# Patient Record
Sex: MALE
[De-identification: names, ages, dates, MRNs, and addresses within clinical notes are randomized per-mention and may not be internally consistent; named-entity substitution may affect disease eponyms.]

---

## 2017-01-05 PROCEDURE — 82607 VITAMIN B-12: CPT | Performed by: INTERNAL MEDICINE

## 2017-01-05 PROCEDURE — 82043 UR ALBUMIN QUANTITATIVE: CPT | Performed by: INTERNAL MEDICINE

## 2017-01-05 PROCEDURE — 82570 ASSAY OF URINE CREATININE: CPT | Performed by: INTERNAL MEDICINE

## 2017-01-06 PROBLEM — E78.5 HYPERLIPIDEMIA LDL GOAL <100: Status: ACTIVE | Noted: 2017-01-06

## 2017-01-06 PROBLEM — E11.42 DM TYPE 2 WITH DIABETIC PERIPHERAL NEUROPATHY (HCC): Status: ACTIVE | Noted: 2017-01-06

## 2017-01-06 PROBLEM — E11.65 UNCONTROLLED TYPE 2 DIABETES MELLITUS WITH HYPERGLYCEMIA, WITHOUT LONG-TERM CURRENT USE OF INSULIN (HCC): Status: ACTIVE | Noted: 2017-01-06

## 2017-01-24 ENCOUNTER — HOSPITAL (OUTPATIENT)
Dept: OTHER | Age: 82
End: 2017-01-24

## 2017-01-24 LAB
ANION GAP SERPL CALC-SCNC: 12 MMOL/L (ref 10–20)
BUN SERPL-MCNC: 18 MG/DL (ref 10–20)
BUN/CREAT SERPL: 16 (ref 7–25)
CALCIUM SERPL-MCNC: 9.6 MG/DL (ref 8.4–10.2)
CHLORIDE: 105 MMOL/L (ref 98–107)
CO2 SERPL-SCNC: 28 MMOL/L (ref 21–32)
CREAT SERPL-MCNC: 1.11 MG/DL (ref 0.67–1.17)
GLUCOSE SERPL-MCNC: 141 MG/DL (ref 65–99)
POTASSIUM SERPL-SCNC: 4.6 MMOL/L (ref 3.4–5.1)
SODIUM SERPL-SCNC: 140 MMOL/L (ref 135–145)

## 2017-01-25 ENCOUNTER — CHARTING TRANS (OUTPATIENT)
Dept: OTHER | Age: 82
End: 2017-01-25

## 2017-10-03 PROBLEM — E11.65 UNCONTROLLED TYPE 2 DIABETES MELLITUS WITH HYPERGLYCEMIA, WITHOUT LONG-TERM CURRENT USE OF INSULIN (HCC): Status: RESOLVED | Noted: 2017-01-06 | Resolved: 2017-10-03

## 2017-10-03 PROBLEM — E78.5 HYPERLIPIDEMIA LDL GOAL <100: Status: RESOLVED | Noted: 2017-01-06 | Resolved: 2017-10-03

## 2017-11-14 PROBLEM — Z96.1 BILATERAL PSEUDOPHAKIA: Status: ACTIVE | Noted: 2017-11-14

## 2017-11-14 PROBLEM — H16.223 KERATITIS SICCA, BOTH EYES: Status: ACTIVE | Noted: 2017-11-14

## 2017-11-14 PROBLEM — M35.01 KERATITIS SICCA, BOTH EYES (HCC): Status: ACTIVE | Noted: 2017-11-14

## 2018-04-02 PROBLEM — D64.9 ANEMIA, UNSPECIFIED TYPE: Status: ACTIVE | Noted: 2018-04-02

## 2018-04-02 PROBLEM — Z96.1 BILATERAL PSEUDOPHAKIA: Status: RESOLVED | Noted: 2017-11-14 | Resolved: 2018-04-02

## 2018-04-02 PROBLEM — E11.42 DM TYPE 2 WITH DIABETIC PERIPHERAL NEUROPATHY (HCC): Status: RESOLVED | Noted: 2017-01-06 | Resolved: 2018-04-02

## 2018-04-02 PROBLEM — M35.01 KERATITIS SICCA, BOTH EYES (HCC): Status: RESOLVED | Noted: 2017-11-14 | Resolved: 2018-04-02

## 2018-04-02 PROBLEM — H16.223 KERATITIS SICCA, BOTH EYES: Status: RESOLVED | Noted: 2017-11-14 | Resolved: 2018-04-02

## 2018-09-25 PROCEDURE — 82570 ASSAY OF URINE CREATININE: CPT | Performed by: INTERNAL MEDICINE

## 2018-09-25 PROCEDURE — 82043 UR ALBUMIN QUANTITATIVE: CPT | Performed by: INTERNAL MEDICINE

## 2018-10-09 PROBLEM — E11.9 TYPE 2 DIABETES MELLITUS WITHOUT RETINOPATHY (HCC): Status: ACTIVE | Noted: 2018-10-09

## 2018-10-09 PROBLEM — H04.123 DRY EYE SYNDROME OF BILATERAL LACRIMAL GLANDS: Status: ACTIVE | Noted: 2018-10-09

## 2019-04-09 PROBLEM — Z96.1 BILATERAL PSEUDOPHAKIA: Status: RESOLVED | Noted: 2017-11-14 | Resolved: 2019-04-09

## 2019-04-09 PROBLEM — H04.123 DRY EYE SYNDROME OF BILATERAL LACRIMAL GLANDS: Status: RESOLVED | Noted: 2018-10-09 | Resolved: 2019-04-09

## 2019-10-08 PROBLEM — H18.413 ARCUS SENILIS OF CORNEA, BILATERAL: Status: ACTIVE | Noted: 2019-10-08

## 2021-04-11 DIAGNOSIS — Z23 NEED FOR VACCINATION: ICD-10-CM

## 2021-10-18 PROBLEM — E11.9 TYPE 2 DIABETES MELLITUS WITHOUT RETINOPATHY (HCC): Status: RESOLVED | Noted: 2018-10-09 | Resolved: 2021-10-18

## 2021-10-18 PROBLEM — H04.123 DRY EYE SYNDROME OF BILATERAL LACRIMAL GLANDS: Status: RESOLVED | Noted: 2018-10-09 | Resolved: 2021-10-18

## 2021-10-18 PROBLEM — D64.9 ANEMIA, UNSPECIFIED TYPE: Status: RESOLVED | Noted: 2018-04-02 | Resolved: 2021-10-18

## 2024-02-01 ENCOUNTER — APPOINTMENT (OUTPATIENT)
Dept: GENERAL RADIOLOGY | Facility: HOSPITAL | Age: 89
End: 2024-02-01
Attending: EMERGENCY MEDICINE
Payer: MEDICARE

## 2024-02-01 ENCOUNTER — HOSPITAL ENCOUNTER (INPATIENT)
Facility: HOSPITAL | Age: 89
LOS: 9 days | Discharge: HOME HEALTH | End: 2024-02-10
Attending: EMERGENCY MEDICINE | Admitting: INTERNAL MEDICINE
Payer: MEDICARE

## 2024-02-01 ENCOUNTER — APPOINTMENT (OUTPATIENT)
Dept: CV DIAGNOSTICS | Facility: HOSPITAL | Age: 89
End: 2024-02-01
Attending: INTERNAL MEDICINE
Payer: MEDICARE

## 2024-02-01 DIAGNOSIS — I50.9 CONGESTIVE HEART FAILURE, UNSPECIFIED HF CHRONICITY, UNSPECIFIED HEART FAILURE TYPE (HCC): ICD-10-CM

## 2024-02-01 DIAGNOSIS — I48.91 ATRIAL FIBRILLATION, NEW ONSET (HCC): Primary | ICD-10-CM

## 2024-02-01 LAB
ALBUMIN SERPL-MCNC: 3.8 G/DL (ref 3.2–4.8)
ALBUMIN/GLOB SERPL: 1.5 {RATIO} (ref 1–2)
ALP LIVER SERPL-CCNC: 72 U/L
ALT SERPL-CCNC: 20 U/L
ANION GAP SERPL CALC-SCNC: 6 MMOL/L (ref 0–18)
ANION GAP SERPL CALC-SCNC: 6 MMOL/L (ref 0–18)
AST SERPL-CCNC: 25 U/L (ref ?–34)
BASOPHILS # BLD AUTO: 0.04 X10(3) UL (ref 0–0.2)
BASOPHILS NFR BLD AUTO: 0.6 %
BILIRUB SERPL-MCNC: 0.5 MG/DL (ref 0.2–1.1)
BNP SERPL-MCNC: 180 PG/ML
BUN BLD-MCNC: 25 MG/DL (ref 9–23)
BUN BLD-MCNC: 26 MG/DL (ref 9–23)
BUN/CREAT SERPL: 18.4 (ref 10–20)
BUN/CREAT SERPL: 20.2 (ref 10–20)
CALCIUM BLD-MCNC: 9.3 MG/DL (ref 8.7–10.4)
CALCIUM BLD-MCNC: 9.5 MG/DL (ref 8.7–10.4)
CHLORIDE SERPL-SCNC: 106 MMOL/L (ref 98–112)
CHLORIDE SERPL-SCNC: 108 MMOL/L (ref 98–112)
CO2 SERPL-SCNC: 24 MMOL/L (ref 21–32)
CO2 SERPL-SCNC: 27 MMOL/L (ref 21–32)
CREAT BLD-MCNC: 1.24 MG/DL
CREAT BLD-MCNC: 1.41 MG/DL
DEPRECATED RDW RBC AUTO: 45.8 FL (ref 35.1–46.3)
EGFRCR SERPLBLD CKD-EPI 2021: 48 ML/MIN/1.73M2 (ref 60–?)
EGFRCR SERPLBLD CKD-EPI 2021: 56 ML/MIN/1.73M2 (ref 60–?)
EOSINOPHIL # BLD AUTO: 0.07 X10(3) UL (ref 0–0.7)
EOSINOPHIL NFR BLD AUTO: 1 %
ERYTHROCYTE [DISTWIDTH] IN BLOOD BY AUTOMATED COUNT: 12.7 % (ref 11–15)
EST. AVERAGE GLUCOSE BLD GHB EST-MCNC: 143 MG/DL (ref 68–126)
FLUAV + FLUBV RNA SPEC NAA+PROBE: NEGATIVE
FLUAV + FLUBV RNA SPEC NAA+PROBE: NEGATIVE
GLOBULIN PLAS-MCNC: 2.5 G/DL (ref 2.8–4.4)
GLUCOSE BLD-MCNC: 177 MG/DL (ref 70–99)
GLUCOSE BLD-MCNC: 181 MG/DL (ref 70–99)
GLUCOSE BLDC GLUCOMTR-MCNC: 119 MG/DL (ref 70–99)
GLUCOSE BLDC GLUCOMTR-MCNC: 161 MG/DL (ref 70–99)
GLUCOSE BLDC GLUCOMTR-MCNC: 179 MG/DL (ref 70–99)
HBA1C MFR BLD: 6.6 % (ref ?–5.7)
HCT VFR BLD AUTO: 32.5 %
HGB BLD-MCNC: 10.5 G/DL
IMM GRANULOCYTES # BLD AUTO: 0.01 X10(3) UL (ref 0–1)
IMM GRANULOCYTES NFR BLD: 0.1 %
LYMPHOCYTES # BLD AUTO: 1.14 X10(3) UL (ref 1–4)
LYMPHOCYTES NFR BLD AUTO: 17.1 %
MCH RBC QN AUTO: 32 PG (ref 26–34)
MCHC RBC AUTO-ENTMCNC: 32.3 G/DL (ref 31–37)
MCV RBC AUTO: 99.1 FL
MONOCYTES # BLD AUTO: 0.61 X10(3) UL (ref 0.1–1)
MONOCYTES NFR BLD AUTO: 9.1 %
NEUTROPHILS # BLD AUTO: 4.81 X10 (3) UL (ref 1.5–7.7)
NEUTROPHILS # BLD AUTO: 4.81 X10(3) UL (ref 1.5–7.7)
NEUTROPHILS NFR BLD AUTO: 72.1 %
OSMOLALITY SERPL CALC.SUM OF ELEC: 291 MOSM/KG (ref 275–295)
OSMOLALITY SERPL CALC.SUM OF ELEC: 301 MOSM/KG (ref 275–295)
PLATELET # BLD AUTO: 218 10(3)UL (ref 150–450)
POTASSIUM SERPL-SCNC: 4.5 MMOL/L (ref 3.5–5.1)
POTASSIUM SERPL-SCNC: 4.8 MMOL/L (ref 3.5–5.1)
PROT SERPL-MCNC: 6.3 G/DL (ref 5.7–8.2)
Q-T INTERVAL: 318 MS
QRS DURATION: 116 MS
QTC CALCULATION (BEZET): 469 MS
R AXIS: 32 DEGREES
RBC # BLD AUTO: 3.28 X10(6)UL
RSV RNA SPEC NAA+PROBE: NEGATIVE
SARS-COV-2 RNA RESP QL NAA+PROBE: NOT DETECTED
SODIUM SERPL-SCNC: 136 MMOL/L (ref 136–145)
SODIUM SERPL-SCNC: 141 MMOL/L (ref 136–145)
T AXIS: 147 DEGREES
TROPONIN I SERPL HS-MCNC: 18 NG/L
VENTRICULAR RATE: 131 BPM
WBC # BLD AUTO: 6.7 X10(3) UL (ref 4–11)

## 2024-02-01 PROCEDURE — 0241U SARS-COV-2/FLU A AND B/RSV BY PCR (GENEXPERT): CPT | Performed by: EMERGENCY MEDICINE

## 2024-02-01 PROCEDURE — 93005 ELECTROCARDIOGRAM TRACING: CPT

## 2024-02-01 PROCEDURE — 84484 ASSAY OF TROPONIN QUANT: CPT | Performed by: EMERGENCY MEDICINE

## 2024-02-01 PROCEDURE — 93306 TTE W/DOPPLER COMPLETE: CPT | Performed by: INTERNAL MEDICINE

## 2024-02-01 PROCEDURE — 85025 COMPLETE CBC W/AUTO DIFF WBC: CPT | Performed by: EMERGENCY MEDICINE

## 2024-02-01 PROCEDURE — 99285 EMERGENCY DEPT VISIT HI MDM: CPT

## 2024-02-01 PROCEDURE — 83036 HEMOGLOBIN GLYCOSYLATED A1C: CPT | Performed by: HOSPITALIST

## 2024-02-01 PROCEDURE — 96375 TX/PRO/DX INJ NEW DRUG ADDON: CPT

## 2024-02-01 PROCEDURE — 96374 THER/PROPH/DIAG INJ IV PUSH: CPT

## 2024-02-01 PROCEDURE — 80053 COMPREHEN METABOLIC PANEL: CPT | Performed by: EMERGENCY MEDICINE

## 2024-02-01 PROCEDURE — 71045 X-RAY EXAM CHEST 1 VIEW: CPT | Performed by: EMERGENCY MEDICINE

## 2024-02-01 PROCEDURE — 82962 GLUCOSE BLOOD TEST: CPT

## 2024-02-01 PROCEDURE — 99291 CRITICAL CARE FIRST HOUR: CPT

## 2024-02-01 PROCEDURE — 93010 ELECTROCARDIOGRAM REPORT: CPT

## 2024-02-01 PROCEDURE — 83880 ASSAY OF NATRIURETIC PEPTIDE: CPT | Performed by: EMERGENCY MEDICINE

## 2024-02-01 RX ORDER — CLOPIDOGREL BISULFATE 75 MG/1
75 TABLET ORAL DAILY
Status: DISCONTINUED | OUTPATIENT
Start: 2024-02-02 | End: 2024-02-01

## 2024-02-01 RX ORDER — NICOTINE POLACRILEX 4 MG
30 LOZENGE BUCCAL
Status: DISCONTINUED | OUTPATIENT
Start: 2024-02-01 | End: 2024-02-10

## 2024-02-01 RX ORDER — ACETAMINOPHEN 500 MG
500 TABLET ORAL EVERY 4 HOURS PRN
Status: DISCONTINUED | OUTPATIENT
Start: 2024-02-01 | End: 2024-02-10

## 2024-02-01 RX ORDER — ATORVASTATIN CALCIUM 20 MG/1
20 TABLET, FILM COATED ORAL DAILY
Status: DISCONTINUED | OUTPATIENT
Start: 2024-02-02 | End: 2024-02-09

## 2024-02-01 RX ORDER — DILTIAZEM HYDROCHLORIDE 5 MG/ML
10 INJECTION INTRAVENOUS ONCE
Status: COMPLETED | OUTPATIENT
Start: 2024-02-01 | End: 2024-02-01

## 2024-02-01 RX ORDER — ONDANSETRON 2 MG/ML
4 INJECTION INTRAMUSCULAR; INTRAVENOUS EVERY 6 HOURS PRN
Status: DISCONTINUED | OUTPATIENT
Start: 2024-02-01 | End: 2024-02-10

## 2024-02-01 RX ORDER — METOCLOPRAMIDE HYDROCHLORIDE 5 MG/ML
10 INJECTION INTRAMUSCULAR; INTRAVENOUS EVERY 8 HOURS PRN
Status: DISCONTINUED | OUTPATIENT
Start: 2024-02-01 | End: 2024-02-02

## 2024-02-01 RX ORDER — DILTIAZEM HYDROCHLORIDE 5 MG/ML
10 INJECTION INTRAVENOUS ONCE
Status: DISCONTINUED | OUTPATIENT
Start: 2024-02-01 | End: 2024-02-02 | Stop reason: ALTCHOICE

## 2024-02-01 RX ORDER — FUROSEMIDE 10 MG/ML
40 INJECTION INTRAMUSCULAR; INTRAVENOUS ONCE
Status: COMPLETED | OUTPATIENT
Start: 2024-02-01 | End: 2024-02-01

## 2024-02-01 RX ORDER — METOPROLOL SUCCINATE 25 MG/1
25 TABLET, EXTENDED RELEASE ORAL
Status: DISCONTINUED | OUTPATIENT
Start: 2024-02-01 | End: 2024-02-01

## 2024-02-01 RX ORDER — ENOXAPARIN SODIUM 100 MG/ML
1 INJECTION SUBCUTANEOUS EVERY 12 HOURS
Status: DISCONTINUED | OUTPATIENT
Start: 2024-02-01 | End: 2024-02-10

## 2024-02-01 RX ORDER — FUROSEMIDE 10 MG/ML
40 INJECTION INTRAMUSCULAR; INTRAVENOUS DAILY
Status: DISCONTINUED | OUTPATIENT
Start: 2024-02-02 | End: 2024-02-02

## 2024-02-01 RX ORDER — DEXTROSE MONOHYDRATE 25 G/50ML
50 INJECTION, SOLUTION INTRAVENOUS
Status: DISCONTINUED | OUTPATIENT
Start: 2024-02-01 | End: 2024-02-10

## 2024-02-01 RX ORDER — NICOTINE POLACRILEX 4 MG
15 LOZENGE BUCCAL
Status: DISCONTINUED | OUTPATIENT
Start: 2024-02-01 | End: 2024-02-10

## 2024-02-01 RX ORDER — HYDRALAZINE HYDROCHLORIDE 25 MG/1
25 TABLET, FILM COATED ORAL EVERY 8 HOURS SCHEDULED
Status: DISCONTINUED | OUTPATIENT
Start: 2024-02-01 | End: 2024-02-07

## 2024-02-01 NOTE — H&P
DM Hospitalist H&P       CC:   Chief Complaint   Patient presents with    Abnormal EKG        PCP: Donavan Noonan MD    Date of Admission: 2/1/2024 10:56 AM    ASSESSMENT / PLAN:     Mr. Heard is an 88 yo M with PMH of CVA, PVD, HL who presented from [facility] with new onset Afib.     New onset Afib with RVR  - HR 130s initially  - s/p IVP diltiazem  - cardiology consulted  - check TTE, TSH  - start lovenox, plan to switch to DOAC    Concern for CHF  Fluid overload  - s/p IV lasix in ER, continue lasix 40 mg daily  - TTE ordered  - weights, strict I/O    Hx CVA  - continue plavix    PVD/HL  - statin    ACP  - CODE- DNR/full- confirmed with patient  - POA- wife, then daughter  - lives with spouse at independent living    FN:  - IVF: none  - Diet: cardiac    DVT Prophy:SCD, lovenox  Lines: PIV    Dispo: pending clinical course    Outpatient records or previous hospital records reviewed.     Further recommendations pending patient's clinical course.  DM hospitalist to continue to follow patient while in house    Patient and/or patient's family given opportunity to ask questions and note understanding and agreeing with therapeutic plan as outlined    Aaliyah Alva MD  [facility] [place], [place] Hospitalist  Answering Service number: [phone]    HPI       History of Present Illness:      Mr. Heard is an 88 yo M with PMH of CVA, PVD, HL who presented from [facility] with new onset Afib. Patient has been having shortness of breath x 1 week, went to [facility], found to have new onset Afib with RVR and concern for fluid overload/CHF. States he has gained about 5 lbs and noticed new leg swelling as well. No prior hx of CHF or Afib. Has had a stroke in the past.    In the ED, found to have Afib with RVR, HR 130s. Given IVP diltiazem and started on diltiazem gtt. Also given IV lasix for fluid overload    PMH  Past Medical History:   Diagnosis Date    Cataract     Dry eye     Former smoker     Hyperlipidemia     Impaired fasting glucose     Left carotid  bruit     Orbital mass     left    Pseudophakia     PVD (peripheral vascular disease) (HCC)     Stroke (cerebrum) ([facility])         PSH  Past Surgical History:   Procedure Laterality Date    CAROTID ENDARTERECTOMY Left     CATARACT Bilateral     YAG CAPSULOTOMY - OD - RIGHT EYE Right 2005        ALL:  No Known Allergies     Home Medications:  No outpatient medications have been marked as taking for the 24 encounter (Hospital Encounter).         Soc Hx  Social History     Tobacco Use    Smoking status: Former     Packs/day: 1.00     Years: 30.00     Additional pack years: 0.00     Total pack years: 30.00     Types: Cigarettes     Quit date: 1978     Years since quittin.1    Smokeless tobacco: Never   Substance Use Topics    Alcohol use: No     Alcohol/week: 0.0 standard drinks of alcohol        Fam Hx  Family History   Problem Relation Age of Onset    Cataracts Mother        Review of Systems  Comprehensive ROS reviewed and negative except for what's stated above.     OBJECTIVE:  /69   Pulse 105   Temp 97.9 °F (36.6 °C) (Oral)   Resp 20   Ht 6' (1.829 m)   Wt 160 lb (72.6 kg)   SpO2 93%   BMI 21.70 kg/m²     GEN: elderly male in NAD  HEENT: EOMI,  Pulm: decreased breath sounds at bases, bibasilar crackles  CV: tachycardic, irregularly irregular  ABD: Soft, non-tender, non-distended, +BS  SKIN: warm, dry  EXT: 1-2+pitting edema    Diagnostic Data:    CBC/Chem    Recent Labs   Lab 24  1103   RBC 3.28*   HGB 10.5*   HCT 32.5*   MCV 99.1   MCH 32.0   MCHC 32.3   RDW 12.7   NEPRELIM 4.81   WBC 6.7   .0         Recent Labs   Lab 24  1103   *   BUN 25*   CREATSERUM 1.24   EGFRCR 56*   CA 9.3      K 4.8      CO2 24.0     Lab Results   Component Value Date    WBC 6.7 2024    HGB 10.5 2024    HCT 32.5 2024    .0 2024    CREATSERUM 1.24 2024    BUN 25 2024     2024    K 4.8 2024      02/01/2024    CO2 24.0 02/01/2024     02/01/2024    CA 9.3 02/01/2024    ALB 3.8 02/01/2024    ALKPHO 72 02/01/2024    BILT 0.5 02/01/2024    TP 6.3 02/01/2024    AST 25 02/01/2024    ALT 20 02/01/2024    TROPHS 18 02/01/2024       No results for input(s): \"TROP\" in the last 168 hours.    Additional Diagnostics: ECG: afib with RVR    Radiology: XR CHEST AP PORTABLE  (CPT=71045)    Result Date: 2/1/2024  CONCLUSION:  1. Normal heart size with moderate pulmonary edema pattern suggesting cardiac failure/fluid overload.  Small bilateral pleural effusions.  I cannot definitely exclude bibasilar pneumonia.  Correlate clinically and follow-up studies are advised.    Dictated by (CST): Damian Bowen MD on 2/01/2024 at 11:18 AM     Finalized by (CST): Damian Bowen MD on 2/01/2024 at 11:19 AM

## 2024-02-01 NOTE — ED PROVIDER NOTES
Patient Seen in: [facility] Emergency Department      History     Chief Complaint   Patient presents with    Abnormal EKG     Stated Complaint: abnormal ekg, afib rvr    Subjective:   HPI    The patient is an 89-year-old male who presents with 1 week of shortness of breath that is increased with exertion.  Today found to be in atrial fibrillation and sent here for further evaluation.  Patient had a few days of chest pain but none in the last 2 days.  He has also had a mild cough and sore throat.  No fevers or chills.  No leg pain or swelling.    Objective:   Past Medical History:   Diagnosis Date    Cataract     Dry eye     Former smoker     Hyperlipidemia     Impaired fasting glucose     Left carotid bruit     Orbital mass     left    Pseudophakia     PVD (peripheral vascular disease) (HCC)     Stroke (cerebrum) (HCC)               Past Surgical History:   Procedure Laterality Date    CAROTID ENDARTERECTOMY Left     CATARACT Bilateral     YAG CAPSULOTOMY - OD - RIGHT EYE Right 2005                Social History     Socioeconomic History    Marital status:    Tobacco Use    Smoking status: Former     Packs/day: 1.00     Years: 30.00     Additional pack years: 0.00     Total pack years: 30.00     Types: Cigarettes     Quit date: 1978     Years since quittin.1    Smokeless tobacco: Never   Vaping Use    Vaping Use: Never used   Substance and Sexual Activity    Alcohol use: No     Alcohol/week: 0.0 standard drinks of alcohol    Drug use: No              Review of Systems    Positive for stated complaint: abnormal ekg, afib rvr  Other systems are as noted in HPI.  Constitutional and vital signs reviewed.      All other systems reviewed and negative except as noted above.    Physical Exam     ED Triage Vitals [24 1059]   /77   Pulse (!) 123   Resp 24   Temp 97.9 °F (36.6 °C)   Temp src Oral   SpO2 95 %   O2 Device None (Room air)       Current:/81   Pulse 86   Temp  97.9 °F (36.6 °C) (Oral)   Resp 22   Ht 182.9 cm (6')   Wt 72.6 kg   SpO2 93%   BMI 21.70 kg/m²         Physical Exam  Vitals and nursing note reviewed.   Constitutional:       General: He is not in acute distress.     Appearance: Normal appearance. He is well-developed. He is not ill-appearing.   HENT:      Head: Normocephalic and atraumatic.      Nose: Congestion present.      Mouth/Throat:      Mouth: Mucous membranes are moist.      Pharynx: No posterior oropharyngeal erythema.   Eyes:      Conjunctiva/sclera: Conjunctivae normal.      Pupils: Pupils are equal, round, and reactive to light.   Neck:      Vascular: No JVD.   Cardiovascular:      Rate and Rhythm: Tachycardia present. Rhythm irregular.      Heart sounds: Normal heart sounds. No murmur heard.  Pulmonary:      Effort: Pulmonary effort is normal.      Breath sounds: Wheezing and rhonchi present.   Abdominal:      General: Bowel sounds are normal. There is no distension.      Palpations: Abdomen is soft. There is no mass.      Tenderness: There is no abdominal tenderness. There is no guarding or rebound.   Musculoskeletal:         General: Normal range of motion.      Cervical back: Normal range of motion and neck supple.      Right lower leg: No edema.      Left lower leg: No edema.   Skin:     General: Skin is warm and dry.      Capillary Refill: Capillary refill takes less than 2 seconds.      Findings: No rash.   Neurological:      General: No focal deficit present.      Mental Status: He is alert and oriented to person, place, and time.      Deep Tendon Reflexes: Reflexes are normal and symmetric.   Psychiatric:         Judgment: Judgment normal.       Differential diagnosis includes new onset atrial fibrillation, other arrhythmia, electrolyte abnormality CHF, pneumonia, viral upper respiratory tract infection        ED Course     Labs Reviewed   COMP METABOLIC PANEL (14) - Abnormal; Notable for the following components:       Result Value     Glucose 177 (*)     BUN 25 (*)     BUN/CREA Ratio 20.2 (*)     eGFR-Cr 56 (*)     Globulin  2.5 (*)     All other components within normal limits   BNP (B TYPE NATRIURETIC PEPTIDE) - Abnormal; Notable for the following components:    Beta Natriuretic Peptide 180 (*)     All other components within normal limits   CBC W/ DIFFERENTIAL - Abnormal; Notable for the following components:    RBC 3.28 (*)     HGB 10.5 (*)     HCT 32.5 (*)     All other components within normal limits   TROPONIN I HIGH SENSITIVITY - Normal   SARS-COV-2/FLU A AND B/RSV BY PCR (GENEXPERT) - Normal    Narrative:     This test is intended for the qualitative detection and differentiation of SARS-CoV-2, influenza A, influenza B, and respiratory syncytial virus (RSV) viral RNA in nasopharyngeal or nares swabs from individuals suspected of respiratory viral infection consistent with COVID-19 by their healthcare provider. Signs and symptoms of respiratory viral infection due to SARS-CoV-2, influenza, and RSV can be similar.    Test performed using the [employer] Xpress SARS-CoV-2/FLU/RSV (real time RT-PCR)  assay on the [employer] instrument, [address], [place] [address].   This test is being used under the Food and Drug Administration's Emergency Use Authorization.    The authorized Fact Sheet for Healthcare Providers for this assay is available upon request from the laboratory.   CBC WITH DIFFERENTIAL WITH PLATELET    Narrative:     The following orders were created for panel order CBC With Differential With Platelet.  Procedure                               Abnormality         Status                     ---------                               -----------         ------                     CBC W/ DIFFERENTIAL[[id number]]          Abnormal            Final result                 Please view results for these tests on the individual orders.   RAINBOW DRAW LAVENDER   RAINBOW DRAW LIGHT GREEN   RAINBOW DRAW BLUE   RAINBOW DRAW GOLD     EKG    Rate, intervals  and axes as noted on EKG Report.  Rate: 131  Rhythm: Atrial Fibrillation  Reading: Atrial fibrillation with rapid ventricular response, LVH, nonspecific ST changes                          MDM      Pulse ox 93% on room air with mild hypoxia  Admission disposition: 2/1/2024 12:12 PM                                        Medical Decision Making  Patient with new onset atrial fibrillation with CHF heart rate controlled with Cardizem  Will admit with cardiology on consult for further management\  Vital signs stable prior to admission    Problems Addressed:  Atrial fibrillation, new onset (HCC): acute illness or injury that poses a threat to life or bodily functions  Congestive heart failure, unspecified HF chronicity, unspecified heart failure type (HCC): acute illness or injury that poses a threat to life or bodily functions    Amount and/or Complexity of Data Reviewed  Independent Historian: spouse and EMS  Labs: ordered.  Radiology: ordered and independent interpretation performed.     Details: CHF other results per radiologist  ECG/medicine tests: ordered and independent interpretation performed. Decision-making details documented in ED Course.  Discussion of management or test interpretation with external provider(s): Case discussed with duly hospitalist and cardiology and patient will be admitted for further management    Risk  Decision regarding hospitalization.    Critical Care  Total time providing critical care: minutes (A total of 30 minutes of critical care time (exclusive of billable procedures) was administered to manage the patient's cardiovascular instability due to his new onset atrial fibrillation with CHF.  This involved direct patient intervention, complex decision making, and/or extensive discussions with the patient, family, and clinical staff.  )        Disposition and Plan     Clinical Impression:  1. Atrial fibrillation, new onset (HCC)    2. Congestive heart failure, unspecified HF chronicity,  unspecified heart failure type (HCC)         Disposition:  Admit  2/1/2024 12:12 pm    Follow-up:  No follow-up provider specified.  We recommend that you schedule follow up care with a primary care provider within the next three months to obtain basic health screening including reassessment of your blood pressure.      Medications Prescribed:  Current Discharge Medication List                            Hospital Problems       Present on Admission  Date Reviewed: 3/14/2022            ICD-10-CM Noted POA    * (Principal) Atrial fibrillation, new onset (HCC) I48.91 2/1/2024 Unknown

## 2024-02-01 NOTE — PLAN OF CARE
Chris is alert and oriented, up with standby assistance. Room air. IV cardizem. IV lasix. Echo today. Plan is for rate control and to continue diuresis.   Problem: Patient Centered Care  Goal: Patient preferences are identified and integrated in the patient's plan of care  Description: Interventions:  - What would you like us to know as we care for you? I live at an independent living facility with my wife.   - Provide timely, complete, and accurate information to patient/family  - Incorporate patient and family knowledge, values, beliefs, and cultural backgrounds into the planning and delivery of care  - Encourage patient/family to participate in care and decision-making at the level they choose  - Honor patient and family perspectives and choices  Outcome: Progressing     Problem: Patient/Family Goals  Goal: Patient/Family Long Term Goal  Description: Patient's Long Term Goal: to go home    Interventions:  - rate control  -diuretics  - See additional Care Plan goals for specific interventions  Outcome: Progressing  Goal: Patient/Family Short Term Goal  Description: Patient's Short Term Goal: to feel better    Interventions:   - rate control  -diuretics  - See additional Care Plan goals for specific interventions  Outcome: Progressing

## 2024-02-01 NOTE — CONSULTS
Cardiology Consultation  [facility]    Edward Heard Patient Status:  Inpatient    1934 MRN [id number]   Location [facility] 1W Attending Aaliyah Alva MD   Hosp Day # 0 PCP Donavan Noonan MD     Reason for Consultation:  Atrial fibrillation, congestive heart failure    History of Present Illness:  Edward Heard is a a(n) 89 year old male with hyperlipidemia, history of TIA, peripheral vascular disease who presents with 1 week of shortness of breath and decreased exertional capacity.  He states he felt well 1 to 2 weeks ago but has since then had progressive dyspnea on exertion.  He also endorses swelling of his feet to his ankles.  He has no prior history of atrial fibrillation, MI, CHF.  He has history of TIA that occurred in the late  for which he takes clopidogrel 75 mg daily.    He denies chest pain, nausea, diaphoresis.    EKG showing atrial fib with RVR, LBBB (new compared to EKG from ).  Hstrop negative  Cr 1.24, Na 136    Care everywhere reviewed.    Assessment/Plan:    Atrial fibrillation with RVR  With evidence of volume overload.  His rate ranges from 100-130 bpm. Given that he is volume overloaded, would allow for some degree of compensatory tachycardia at this time.  Congestive heart failure  Unknown etiology. May be driven afib with RVR. No recent TTE available.  HDL  TIA  PVD    PLAN  Lasix IV 40mg BID  TTE  Avoid AV kirit blocking agents unless HR > 130 bpm given CHF  Eliquis 5mg BID. Can discontinue clopidgrel to minimize bleeding risk given his age.  Atorvastatin 40mg daily    History:  Past Medical History:   Diagnosis Date    Cataract     Dry eye     Former smoker     Hyperlipidemia     Impaired fasting glucose     Left carotid bruit     Orbital mass     left    Pseudophakia     PVD (peripheral vascular disease) (HCC)     Stroke (cerebrum) (HCC)      Past Surgical History:   Procedure Laterality Date    CAROTID ENDARTERECTOMY Left     CATARACT Bilateral      YAG CAPSULOTOMY - OD - RIGHT EYE Right 06/21/2005     Family History   Problem Relation Age of Onset    Cataracts Mother       reports that he quit smoking about 45 years ago. His smoking use included cigarettes. He has a 30 pack-year smoking history. He has never used smokeless tobacco. He reports that he does not drink alcohol and does not use drugs.    Allergies:  No Known Allergies    Medications:  No current facility-administered medications on file prior to encounter.     Current Outpatient Medications on File Prior to Encounter   Medication Sig Dispense Refill    metFORMIN 500 MG Oral Tab Take 1 tablet (500 mg total) by mouth daily with breakfast.      atorvastatin 20 MG Oral Tab Take 1 tablet (20 mg total) by mouth daily. 90 tablet 3    clopidogrel 75 MG Oral Tab Take 1 tablet (75 mg total) by mouth daily. 90 tablet 3    MULTIVITAMINS OR one tab daily         Review of Systems:  Constitutional: denies fevers, chills, night sweats  HEENT: denies headache, vision changes, trouble or pain with swallowing  Cardiac: denies chest pain, palpitations, edema  Pulm: denies dyspnea, cough, wheeze  GI: denies n/v, abd pain, diarrhea or constipation  : denies hematuria, dysuria, incontinence  MSK: denies muscle or joint pains  Neuro: denies numbness, weakness, paresthesias  Psych: denies anxiety, depression  Integument: denies skin rashes or lesions  Heme: denies easy bruising or bleeding  Endo: denies heat/cold intolerance, skin or nail changes      Physical Exam:  Blood pressure 124/69, pulse 105, temperature 97.9 °F (36.6 °C), temperature source Oral, resp. rate 20, height 6' (1.829 m), weight 160 lb (72.6 kg), SpO2 93%.  Wt Readings from Last 3 Encounters:   02/01/24 160 lb (72.6 kg)   10/18/21 168 lb (76.2 kg)   07/02/21 167 lb 9.6 oz (76 kg)       General: awake, alert, oriented x 3, no acute distress  HEENT: at/nc, perrl, eomi  Neck: JVP to 12 cm H2O, carotids 2+ no bruits.  Cardiac: Tachycardia, irregularly  irregular, S1, S2 normal, no murmur, rub or gallop.  Lungs: No rhonchi or dullness.  Normal excursions and effort. Bilateral crackles to mid lung.  Abdomen: Soft, non-tender, non-distended, normal bowel sounds   Extremities: Without clubbing, cyanosis. Peripheral pulses are 2+. 1+ pitting edema to ankles bilaterally.  Neurologic: Alert and oriented, normal affect.  Psych: normal mood and affect  Skin: Warm and dry.     Laboratories and Data:  Diagnostics:    Labs:   CBC:    Lab Results   Component Value Date    WBC 6.7 02/01/2024    WBC 7.36 10/01/2020    WBC 7.01 11/07/2019     Lab Results   Component Value Date    HEMOGLOBIN 12.7 04/02/2015    HEMOGLOBIN 12.7 10/01/2014    HEMOGLOBIN 12.7 04/02/2014    HGB 10.5 (L) 02/01/2024    HGB 11.4 (L) 10/01/2020    HGB 11.3 (L) 11/07/2019      Lab Results   Component Value Date    .0 02/01/2024     10/01/2020     11/07/2019     BMP:     Lab Results   Component Value Date    GLUCOSE 136 (H) 04/02/2015    GLUCOSE 122 (H) 10/01/2014    GLUCOSE 114 (H) 04/02/2014     Lab Results   Component Value Date    K 4.8 02/01/2024    K 5.45 (H) 10/18/2021    K 5.64 (H) 10/01/2020     Lab Results   Component Value Date    BUN 25 (H) 02/01/2024    BUN 24.0 (H) 10/18/2021    BUN 21.0 (H) 10/01/2020     Lab Results   Component Value Date    CREATSERUM 1.24 02/01/2024    CREATSERUM 1.12 10/18/2021    CREATSERUM 1.11 10/01/2020     Cholesterol:     Lab Results   Component Value Date    CHOLEST 133.00 10/18/2021    CHOLEST 116.00 10/01/2020    CHOLEST 120.00 09/30/2019     Lab Results   Component Value Date    HDL 58 10/18/2021    HDL 54 10/01/2020    HDL 60 (L) 09/30/2019     Lab Results   Component Value Date    TRIG 44.00 10/18/2021    TRIG 42.00 10/01/2020    TRIG 46.00 09/30/2019    TRIGLY 61 04/02/2015    TRIGLY 75 10/01/2014    TRIGLY 63 04/02/2014     Lab Results   Component Value Date    LDL 66 10/18/2021    LDL 54 10/01/2020    LDL 51 09/30/2019     Lab Results    Component Value Date    AST 25 02/01/2024    AST 19 10/18/2021    AST 19 10/01/2020     Lab Results   Component Value Date    ALT 20 02/01/2024    ALT 14 10/18/2021    ALT 12 10/01/2020           Mau Grigsby MD  Interventional Cardiology  [place]  2/1/2024  1:00 PM

## 2024-02-01 NOTE — PLAN OF CARE
BRIEF NOTE    Briefly, patient is a 89 yo man with HDL, hx TIA, PVD presenting with CHF and in afib wit RVR.  Initially received IV lasix and dilt IV for HR control.  He had TTE today, which shows severely depressed LVEF to 15-20% with global hypokinesis.    -- Will discontinue diltiazem given severely depressed LVEF  -- Can allow for compensatory tachycardia given severely depressed LVEF  -- If HR is consistently > 130 bpm, can trial beta-blocker if needed.  -- Continue aggressive IV diuresis.   -- Depressed LVEF may be 2/2 tachy-mediated cardiomyopathy but still needs ischemic evaluation. Can plan for ischemic evaluation when patient more euvolemic and is hemodynamically table.    Mau Grigsby MD  Interventional Cardiology  [place]

## 2024-02-02 LAB
ANION GAP SERPL CALC-SCNC: 5 MMOL/L (ref 0–18)
ANION GAP SERPL CALC-SCNC: 8 MMOL/L (ref 0–18)
BNP SERPL-MCNC: 264 PG/ML
BUN BLD-MCNC: 29 MG/DL (ref 9–23)
BUN BLD-MCNC: 33 MG/DL (ref 9–23)
BUN/CREAT SERPL: 20.1 (ref 10–20)
BUN/CREAT SERPL: 21.3 (ref 10–20)
CALCIUM BLD-MCNC: 9.3 MG/DL (ref 8.7–10.4)
CALCIUM BLD-MCNC: 9.4 MG/DL (ref 8.7–10.4)
CHLORIDE SERPL-SCNC: 108 MMOL/L (ref 98–112)
CHLORIDE SERPL-SCNC: 109 MMOL/L (ref 98–112)
CO2 SERPL-SCNC: 26 MMOL/L (ref 21–32)
CO2 SERPL-SCNC: 28 MMOL/L (ref 21–32)
CREAT BLD-MCNC: 1.44 MG/DL
CREAT BLD-MCNC: 1.55 MG/DL
DEPRECATED RDW RBC AUTO: 45.3 FL (ref 35.1–46.3)
EGFRCR SERPLBLD CKD-EPI 2021: 43 ML/MIN/1.73M2 (ref 60–?)
EGFRCR SERPLBLD CKD-EPI 2021: 46 ML/MIN/1.73M2 (ref 60–?)
ERYTHROCYTE [DISTWIDTH] IN BLOOD BY AUTOMATED COUNT: 12.7 % (ref 11–15)
GLUCOSE BLD-MCNC: 144 MG/DL (ref 70–99)
GLUCOSE BLD-MCNC: 154 MG/DL (ref 70–99)
GLUCOSE BLDC GLUCOMTR-MCNC: 120 MG/DL (ref 70–99)
GLUCOSE BLDC GLUCOMTR-MCNC: 142 MG/DL (ref 70–99)
GLUCOSE BLDC GLUCOMTR-MCNC: 175 MG/DL (ref 70–99)
GLUCOSE BLDC GLUCOMTR-MCNC: 175 MG/DL (ref 70–99)
HCT VFR BLD AUTO: 34.1 %
HGB BLD-MCNC: 11.6 G/DL
MCH RBC QN AUTO: 33.4 PG (ref 26–34)
MCHC RBC AUTO-ENTMCNC: 34 G/DL (ref 31–37)
MCV RBC AUTO: 98.3 FL
OSMOLALITY SERPL CALC.SUM OF ELEC: 302 MOSM/KG (ref 275–295)
OSMOLALITY SERPL CALC.SUM OF ELEC: 304 MOSM/KG (ref 275–295)
PLATELET # BLD AUTO: 254 10(3)UL (ref 150–450)
POTASSIUM SERPL-SCNC: 4.4 MMOL/L (ref 3.5–5.1)
POTASSIUM SERPL-SCNC: 5.3 MMOL/L (ref 3.5–5.1)
RBC # BLD AUTO: 3.47 X10(6)UL
SODIUM SERPL-SCNC: 142 MMOL/L (ref 136–145)
SODIUM SERPL-SCNC: 142 MMOL/L (ref 136–145)
WBC # BLD AUTO: 8.4 X10(3) UL (ref 4–11)

## 2024-02-02 PROCEDURE — 97165 OT EVAL LOW COMPLEX 30 MIN: CPT

## 2024-02-02 PROCEDURE — 94640 AIRWAY INHALATION TREATMENT: CPT

## 2024-02-02 PROCEDURE — 83880 ASSAY OF NATRIURETIC PEPTIDE: CPT | Performed by: INTERNAL MEDICINE

## 2024-02-02 PROCEDURE — 80048 BASIC METABOLIC PNL TOTAL CA: CPT | Performed by: INTERNAL MEDICINE

## 2024-02-02 PROCEDURE — 97530 THERAPEUTIC ACTIVITIES: CPT

## 2024-02-02 PROCEDURE — 97161 PT EVAL LOW COMPLEX 20 MIN: CPT

## 2024-02-02 PROCEDURE — 85027 COMPLETE CBC AUTOMATED: CPT | Performed by: HOSPITALIST

## 2024-02-02 PROCEDURE — 94799 UNLISTED PULMONARY SVC/PX: CPT

## 2024-02-02 PROCEDURE — 82962 GLUCOSE BLOOD TEST: CPT

## 2024-02-02 PROCEDURE — 80048 BASIC METABOLIC PNL TOTAL CA: CPT | Performed by: HOSPITALIST

## 2024-02-02 RX ORDER — METOCLOPRAMIDE HYDROCHLORIDE 5 MG/ML
5 INJECTION INTRAMUSCULAR; INTRAVENOUS EVERY 8 HOURS PRN
Status: DISCONTINUED | OUTPATIENT
Start: 2024-02-02 | End: 2024-02-09

## 2024-02-02 RX ORDER — IPRATROPIUM BROMIDE AND ALBUTEROL SULFATE 2.5; .5 MG/3ML; MG/3ML
3 SOLUTION RESPIRATORY (INHALATION) EVERY 6 HOURS PRN
Status: DISCONTINUED | OUTPATIENT
Start: 2024-02-02 | End: 2024-02-10

## 2024-02-02 NOTE — PROGRESS NOTES
[facility] [place]     CC: Hospital Follow up    PCP: Donavan Noonan MD       Assessment/Plan:     Principal Problem:    Atrial fibrillation, new onset (HCC)  Active Problems:    Congestive heart failure, unspecified HF chronicity, unspecified heart failure type (HCC)    Mr. Heard is an 88 yo M with PMH of CVA, PVD, HL who presented from [facility] with new onset Afib.      Acute systolic CHF  Fluid overload  - s/p IV lasix, now on hold  - TTE with EF 15-20%  - weights, strict I/O  - plan for cardiac cath on Monday    New onset Afib with RVR  - HR 130s initially  - s/p IVP diltiazem  - cardiology consulted  - check TTE, TSH  - start lovenox, plan to switch to DOAC closer to discharge  - PO metoprolol started, more lenient with HR due to systolic CHF     Hx CVA  - continue plavix     PVD/HL  - statin     ACP  - CODE- DNR/full- confirmed with patient  - POA- wife, then daughter  - lives with spouse at independent living     FN:  - IVF: none  - Diet: cardiac     DVT Prophy:SCD, lovenox  Lines: PIV     Dispo: pending clinical course     Outpatient records or previous hospital records reviewed.      Further recommendations pending patient's clinical course.  [facility] [place] continue to follow patient while in house     Patient and/or patient's family given opportunity to ask questions and note understanding and agreeing with therapeutic plan as outlined     Aaliyah Alva MD  [facility] [facility]  Answering Service number: [phone]     Subjective:     Initially wanted to go home today, long discussion with multiple family members at bedside, wife, 3 sons, daughter, wanted patient to say, patient agreeable to staying for cardiac cath on Monday.     OBJECTIVE:    Blood pressure 96/63, pulse (!) 128, temperature 98 °F (36.7 °C), temperature source Oral, resp. rate 20, height 6' 1\" (1.854 m), weight 156 lb 9.6 oz (71 kg), SpO2 92%.    Temp:  [98 °F (36.7 °C)-98.3 °F (36.8 °C)] 98 °F (36.7 °C)  Pulse:  []  128  Resp:  [18-20] 20  BP: ()/(53-84) 96/63  SpO2:  [91 %-94 %] 92 %      Intake/Output:    Intake/Output Summary (Last 24 hours) at 2/2/2024 1338  Last data filed at 2/2/2024 1100  Gross per 24 hour   Intake 1070 ml   Output 4150 ml   Net -3080 ml       Last 3 Weights   02/02/24 0450 156 lb 9.6 oz (71 kg)   02/01/24 1341 161 lb 12.8 oz (73.4 kg)   02/01/24 1200 161 lb 12.8 oz (73.4 kg)   02/01/24 1059 160 lb (72.6 kg)   10/18/21 0813 168 lb (76.2 kg)   07/02/21 0827 167 lb 9.6 oz (76 kg)       Exam   GEN: elderly male in NAD  HEENT: EOMI,  Pulm: improved air movement, no crackles  CV: tachycardic, irregularly irregular  ABD: Soft, non-tender, non-distended, +BS  SKIN: warm, dry  EXT: trace edema at ankles     Data Review:       Labs:     Recent Labs   Lab 02/01/24  1103 02/02/24  0540   RBC 3.28* 3.47*   HGB 10.5* 11.6*   HCT 32.5* 34.1*   MCV 99.1 98.3   MCH 32.0 33.4   MCHC 32.3 34.0   RDW 12.7 12.7   NEPRELIM 4.81  --    WBC 6.7 8.4   .0 254.0         Recent Labs   Lab 02/01/24  1103 02/01/24  1817 02/02/24  0540   * 181* 144*   BUN 25* 26* 29*   CREATSERUM 1.24 1.41* 1.44*   EGFRCR 56* 48* 46*   CA 9.3 9.5 9.3    141 142   K 4.8 4.5 4.4    108 108   CO2 24.0 27.0 26.0       Recent Labs   Lab 02/01/24  1103   ALT 20   AST 25   ALB 3.8         Imaging:  XR CHEST AP PORTABLE  (CPT=71045)    Result Date: 2/1/2024  CONCLUSION:  1. Normal heart size with moderate pulmonary edema pattern suggesting cardiac failure/fluid overload.  Small bilateral pleural effusions.  I cannot definitely exclude bibasilar pneumonia.  Correlate clinically and follow-up studies are advised.    Dictated by (CST): Damian Bowen MD on 2/01/2024 at 11:18 AM     Finalized by (CST): Damian Bowen MD on 2/01/2024 at 11:19 AM             Meds:      enoxaparin  1 mg/kg Subcutaneous q12h    insulin aspart  1-5 Units Subcutaneous TID CC    atorvastatin  20 mg Oral Daily    hydrALAZINE  25 mg Oral Q8H NEREIDA     metoprolol tartrate  25 mg Oral 2x Daily(Beta Blocker)       metoclopramide, glucose **OR** glucose **OR** glucose-vitamin C **OR** dextrose **OR** glucose **OR** glucose **OR** glucose-vitamin C, acetaminophen, ondansetron

## 2024-02-02 NOTE — CM/SW NOTE
Social work received a consult to price check a patient's Eliquis.    Per the [facility] pharmacy The patient's Eliquis is $50 per month.    Social work will provide a free 30 day coupon.    SHALA/ANTHONY to remain available for support and/or discharge planning.     Mima Boswell MSW, LSW  Discharge Planner [id number]

## 2024-02-02 NOTE — PHYSICAL THERAPY NOTE
PHYSICAL THERAPY EVALUATION - INPATIENT     Room Number: 330/330-A  Evaluation Date: 2/2/2024  Type of Evaluation: Initial   Physician Order: PT Eval and Treat    Presenting Problem: SOB, cough, atrial fibrillation  Co-Morbidities : Hx CVA  Reason for Therapy: Mobility Dysfunction and Discharge Planning    PHYSICAL THERAPY ASSESSMENT     Patient is a 89 year old male admitted 2/1/2024 for SOB, cough, atrial fibrillation. Patient's current functional deficits include impaired bed mobility, transfers, gait, balance, and tolerance for activity, which are below the patient's pre-admission status. Patient in bed with son present upon arrival. RN approved activity. Educated patient on POC and benefits of mobilization. Agreeable to participate. Patient reporting no pain. Patient is hard of hearing, benefits from speaking loudly and repetition as needed.    Bed Mobility: SBA supine>EOB. Patient tolerated sitting EOB approx 5 minutes, requiring BUE support and SBA in order to maintain static sitting balance.  Transfers: CGA sit<>stand without assistive device; cues provided for appropriate UE placement during functional transfers. Instruction on activity pacing upon standing to allow body time to acclimate to change in position. Tolerated static standing unsupported with Min A>CGA for approx 30 seconds prior to mobilization.  Ambulation: Min A with HHA for 1 ft, steps towards HOB; shuffled steps, decreased tyrone speed, decreased step length, slightly unsteady. Cues for safe management of RW with turns.    Patient in bed at end of session. Needs in reach and alarm activated. Son present in room. RN aware.      The patient's Approx Degree of Impairment: 46.58% has been calculated based on documentation in the [facility] '6 clicks' Inpatient Basic Mobility Short Form. Research supports that patients with this level of impairment may benefit from HHPT. Recommendation currently undermined - limited evaluation at this time due to  elevated HR with minimal activity.    Patient will benefit from continued IP PT services to address these deficits in preparation for discharge.    Addendum: LYNNE Calloway communicated with MD after therapy session to clarify patient's activity parameters. Per Elli's discussion with MD, MD is aware of patient's elevated HR and permitted patient to ambulate short distances in room.    DISCHARGE RECOMMENDATIONS  PT Discharge Recommendations: Undetermined    PLAN  PT Treatment Plan: Bed mobility;Body mechanics;Coordination;Endurance;Energy conservation;Patient education;Gait training;Transfer training;Balance training;Strengthening  Rehab Potential : Good  Frequency (Obs): 5x/week       PHYSICAL THERAPY MEDICAL/SOCIAL HISTORY     Problem List  Principal Problem:    Atrial fibrillation, new onset (HCC)  Active Problems:    Congestive heart failure, unspecified HF chronicity, unspecified heart failure type (HCC)      HOME SITUATION  Home Situation  Type of Home: Independent living facility ([place])  Home Layout: One level  Lives With: Spouse  Drives: Yes  Patient Owned Equipment: None  Patient Regularly Uses: Glasses     Prior Level of [place]: Independent with ADLs/iADLs/functional mobility    SUBJECTIVE  Agreeable     PHYSICAL THERAPY EXAMINATION     OBJECTIVE  Precautions: Bed/chair alarm;Hard of hearing  Fall Risk:  (Moderate fall risk)    WEIGHT BEARING RESTRICTION  Weight Bearing Restriction: None    PAIN ASSESSMENT  Ratin    COGNITION  Overall Cognitive Status:  WFL - within functional limits    RANGE OF MOTION AND STRENGTH ASSESSMENT  Upper extremity ROM and strength are within functional limits   Lower extremity ROM is within functional limits   Lower extremity strength is within functional limits; generalized weakness     BALANCE  Static Sitting: Good  Dynamic Sitting: Fair +  Static Standing: Fair -  Dynamic Standing: Poor +    ACTIVITY TOLERANCE  Pulse: (!) 152 (fluctuating between 102-152 bpm  with transfer at EOB; RN and MD aware)  Heart Rate Source: Monitor    AM-PAC '6-Clicks' INPATIENT SHORT FORM - BASIC MOBILITY  How much difficulty does the patient currently have...  Patient Difficulty: Turning over in bed (including adjusting bedclothes, sheets and blankets)?: A Little   Patient Difficulty: Sitting down on and standing up from a chair with arms (e.g., wheelchair, bedside commode, etc.): A Little   Patient Difficulty: Moving from lying on back to sitting on the side of the bed?: A Little   How much help from another person does the patient currently need...   Help from Another: Moving to and from a bed to a chair (including a wheelchair)?: A Little   Help from Another: Need to walk in hospital room?: A Little   Help from Another: Climbing 3-5 steps with a railing?: A Little     AM-PAC Score:  Raw Score: 18   Approx Degree of Impairment: 46.58%   Standardized Score (AM-PAC Scale): 43.63   CMS Modifier (G-Code): CK    FUNCTIONAL ABILITY STATUS  Functional Mobility/Gait Assessment  Gait Assistance: Minimum assistance  Distance (ft): 1 ft steps towards HOB  Assistive Device: Other (Comment) (HHA)  Pattern: Shuffle (decreased tyrone speed, decreased step length, slightly unsteady)    Exercise/Education Provided:  Bed mobility  Body mechanics  Energy conservation  Posture  Transfer training    Patient End of Session: In bed;Call light within reach;Needs met;RN aware of session/findings;All patient questions and concerns addressed;Family present;Alarm set    CURRENT GOALS    Goals to be met by: 2/16/24  Patient Goal Patient's self-stated goal is: go home   Goal #1 Patient is able to demonstrate supine - sit EOB @ level: supervision     Goal #1   Current Status    Goal #2 Patient is able to demonstrate transfers Sit to/from Stand at assistance level: supervision with  LRAD     Goal #2  Current Status    Goal #3 Patient is able to ambulate 100 feet with assist device:  LRAD  at assistance level: supervision    Goal #3   Current Status    Goal #4 Patient to demonstrate independence with home activity/exercise instructions provided to patient in preparation for discharge.   Goal #4   Current Status      Patient Evaluation Complexity Level:  History Low - no personal factors and/or co-morbidities   Examination of body systems Low - addressing 1-2 elements   Clinical Presentation Moderate - Evolving   Clinical Decision Making Moderate Complexity       Therapeutic Activity: 10 minutes

## 2024-02-02 NOTE — PLAN OF CARE
Pt alert [facility]. Pt on RA. No complains of pain/discomfort. Pt on blood sugar monitoring - on insulin sliding scale. TTE done today. Pt given IV lasix - strict I/O. Cardizem drip discontinued by MD. Monitoring HR as per MD order. Call light within reach - Safety precautions in place - Frequent rounding by nursing staffs.    Problem: Patient Centered Care  Goal: Patient preferences are identified and integrated in the patient's plan of care  Description: Interventions:  - What would you like us to know as we care for you? I live at an independent living facility with my wife.   - Provide timely, complete, and accurate information to patient/family  - Incorporate patient and family knowledge, values, beliefs, and cultural backgrounds into the planning and delivery of care  - Encourage patient/family to participate in care and decision-making at the level they choose  - Honor patient and family perspectives and choices  2/1/2024 2031 by Niki Garcia RN  Outcome: Progressing  2/1/2024 2030 by Niki Garcia RN  Outcome: Progressing     Problem: Patient/Family Goals  Goal: Patient/Family Long Term Goal  Description: Patient's Long Term Goal: to go home    Interventions:  - rate control  -diuretics  - See additional Care Plan goals for specific interventions  2/1/2024 2031 by Niki Garcia RN  Outcome: Progressing  2/1/2024 2030 by Niki Garcia RN  Outcome: Progressing  Goal: Patient/Family Short Term Goal  Description: Patient's Short Term Goal: to feel better    Interventions:   - rate control  -diuretics  - See additional Care Plan goals for specific interventions  2/1/2024 2031 by Niki Garcia RN  Outcome: Progressing  2/1/2024 2030 by Niki Garcia RN  Outcome: Progressing     Problem: CARDIOVASCULAR - ADULT  Goal: Maintains optimal cardiac output and hemodynamic stability  Description: INTERVENTIONS:  - Monitor vital signs, rhythm, and trends  - Monitor for bleeding, hypotension and signs of decreased cardiac output  -  Evaluate effectiveness of vasoactive medications to optimize hemodynamic stability  - Monitor arterial and/or venous puncture sites for bleeding and/or hematoma  - Assess quality of pulses, skin color and temperature  - Assess for signs of decreased coronary artery perfusion - ex. Angina  - Evaluate fluid balance, assess for edema, trend weights  Outcome: Progressing  Goal: Absence of cardiac arrhythmias or at baseline  Description: INTERVENTIONS:  - Continuous cardiac monitoring, monitor vital signs, obtain 12 lead EKG if indicated  - Evaluate effectiveness of antiarrhythmic and heart rate control medications as ordered  - Initiate emergency measures for life threatening arrhythmias  - Monitor electrolytes and administer replacement therapy as ordered  Outcome: Progressing     Problem: GENITOURINARY - ADULT  Goal: Absence of urinary retention  Description: INTERVENTIONS:  - Assess patient’s ability to void and empty bladder  - Monitor intake/output and perform bladder scan as needed  - Follow urinary retention protocol/standard of care  - Consider collaborating with pharmacy to review patient's medication profile  - Implement strategies to promote bladder emptying  Outcome: Progressing     Problem: METABOLIC/FLUID AND ELECTROLYTES - ADULT  Goal: Glucose maintained within prescribed range  Description: INTERVENTIONS:  - Monitor Blood Glucose as ordered  - Assess for signs and symptoms of hyperglycemia and hypoglycemia  - Administer ordered medications to maintain glucose within target range  - Assess barriers to adequate nutritional intake and initiate nutrition consult as needed  - Instruct patient on self management of diabetes  Outcome: Progressing  Goal: Electrolytes maintained within normal limits  Description: INTERVENTIONS:  - Monitor labs and rhythm and assess patient for signs and symptoms of electrolyte imbalances  - Administer electrolyte replacement as ordered  - Monitor response to electrolyte  replacements, including rhythm and repeat lab results as appropriate  - Fluid restriction as ordered  - Instruct patient on fluid and nutrition restrictions as appropriate  Outcome: Progressing  Goal: Hemodynamic stability and optimal renal function maintained  Description: INTERVENTIONS:  - Monitor labs and assess for signs and symptoms of volume excess or deficit  - Monitor intake, output and patient weight  - Monitor urine specific gravity, serum osmolarity and serum sodium as indicated or ordered  - Monitor response to interventions for patient's volume status, including labs, urine output, blood pressure (other measures as available)  - Encourage oral intake as appropriate  - Instruct patient on fluid and nutrition restrictions as appropriate  Outcome: Progressing

## 2024-02-02 NOTE — PLAN OF CARE
Problem: Patient Centered Care  Goal: Patient preferences are identified and integrated in the patient's plan of care  Description: Interventions:  - What would you like us to know as we care for you? I live at an independent living facility with my wife.   - Provide timely, complete, and accurate information to patient/family  - Incorporate patient and family knowledge, values, beliefs, and cultural backgrounds into the planning and delivery of care  - Encourage patient/family to participate in care and decision-making at the level they choose  - Honor patient and family perspectives and choices  Outcome: Progressing     Problem: Patient/Family Goals  Goal: Patient/Family Long Term Goal  Description: Patient's Long Term Goal: to go home    Interventions:  - rate control  -diuretics  - See additional Care Plan goals for specific interventions  Outcome: Progressing  Goal: Patient/Family Short Term Goal  Description: Patient's Short Term Goal: to feel better    Interventions:   - rate control  -diuretics  - See additional Care Plan goals for specific interventions  Outcome: Progressing     Problem: CARDIOVASCULAR - ADULT  Goal: Maintains optimal cardiac output and hemodynamic stability  Description: INTERVENTIONS:  - Monitor vital signs, rhythm, and trends  - Monitor for bleeding, hypotension and signs of decreased cardiac output  - Evaluate effectiveness of vasoactive medications to optimize hemodynamic stability  - Monitor arterial and/or venous puncture sites for bleeding and/or hematoma  - Assess quality of pulses, skin color and temperature  - Assess for signs of decreased coronary artery perfusion - ex. Angina  - Evaluate fluid balance, assess for edema, trend weights  Outcome: Progressing  Goal: Absence of cardiac arrhythmias or at baseline  Description: INTERVENTIONS:  - Continuous cardiac monitoring, monitor vital signs, obtain 12 lead EKG if indicated  - Evaluate effectiveness of antiarrhythmic and heart  rate control medications as ordered  - Initiate emergency measures for life threatening arrhythmias  - Monitor electrolytes and administer replacement therapy as ordered  Outcome: Progressing     Problem: GENITOURINARY - ADULT  Goal: Absence of urinary retention  Description: INTERVENTIONS:  - Assess patient’s ability to void and empty bladder  - Monitor intake/output and perform bladder scan as needed  - Follow urinary retention protocol/standard of care  - Consider collaborating with pharmacy to review patient's medication profile  - Implement strategies to promote bladder emptying  Outcome: Progressing     Problem: METABOLIC/FLUID AND ELECTROLYTES - ADULT  Goal: Glucose maintained within prescribed range  Description: INTERVENTIONS:  - Monitor Blood Glucose as ordered  - Assess for signs and symptoms of hyperglycemia and hypoglycemia  - Administer ordered medications to maintain glucose within target range  - Assess barriers to adequate nutritional intake and initiate nutrition consult as needed  - Instruct patient on self management of diabetes  Outcome: Progressing  Goal: Electrolytes maintained within normal limits  Description: INTERVENTIONS:  - Monitor labs and rhythm and assess patient for signs and symptoms of electrolyte imbalances  - Administer electrolyte replacement as ordered  - Monitor response to electrolyte replacements, including rhythm and repeat lab results as appropriate  - Fluid restriction as ordered  - Instruct patient on fluid and nutrition restrictions as appropriate  Outcome: Progressing  Goal: Hemodynamic stability and optimal renal function maintained  Description: INTERVENTIONS:  - Monitor labs and assess for signs and symptoms of volume excess or deficit  - Monitor intake, output and patient weight  - Monitor urine specific gravity, serum osmolarity and serum sodium as indicated or ordered  - Monitor response to interventions for patient's volume status, including labs, urine output,  blood pressure (other measures as available)  - Encourage oral intake as appropriate  - Instruct patient on fluid and nutrition restrictions as appropriate  Outcome: Progressing    Iv Lasix. Worked with PT/OT today. No complaints of sob or pain. Hr in the 120s; MD notified; no new orders. Plan; angiogram on Monday per MD. PT/OT to reassess patient after angiogram.

## 2024-02-02 NOTE — PROGRESS NOTES
[facility]    Cardiology Progress Note    Edward Heard Patient Status:  Inpatient    1934 MRN [id number]   Location [facility] 3W/SW Attending Aaliyah Alva MD   Hosp Day # 1 PCP Donavan Noonan MD     Briefly, patient is a 88 yo man with HDL, hx TIA, PVD presenting with CHF and in afib wit RVR. Initially received IV lasix and dilt IV for HR control. He had TTE today, which shows severely depressed LVEF to 15-20% with global hypokinesis.     Impression:  Atrial fibrillation with RVR  With evidence of volume overload.  His rate ranges from 100-130 bpm. Given that he is volume overloaded, would allow for some degree of compensatory tachycardia at this time.  Congestive heart failure 2/2 cardiomyopathy, unspecified  Will need ischemic evaluation once euvolemic with angiogram and RHC.  HDL  TIA  PVD     Recommendations:  Hold lasix today given euvolemia and Cr mildly elevated.  TTE reviewed  Avoid AV kirit blocking agents unless HR > 130 bpm given CHF  Continue lovenox subcutaneous. Hold on Monday for angiogram.  Can discontinue clopidgrel to minimize bleeding risk given his age.  Atorvastatin 40mg daily  Metoprolol tartrate 25mg BID started given rates > 145 bpm overnight  Hydralazine 25mg TID to control afterload.  Will start GDMT as able pending improvement in renal function and BP.  Plan for angiogram on 24     Subjective:   Had elevated rates overnight to 145 persistently, which was improved with small dose of metoprolol.      He has had net ~ -4L of UOP. Wt from 161 lbs to 156 lbs. K 4.4, Cr 1.44 (baseline 1.2 on admission).    Patient Active Problem List   Diagnosis    CHRONIC AIRWAY OBSTRUCTION    Carotid disease, bilateral (HCC)    Uncontrolled type 2 diabetes mellitus with hyperglycemia, without long-term current use of insulin (HCC)    Bilateral pseudophakia    Arcus senilis of cornea, bilateral    Atrial fibrillation, new onset (HCC)    Congestive heart failure,  unspecified HF chronicity, unspecified heart failure type (HCC)       Objective:   Temp: 98 °F (36.7 °C)  Pulse: 128  Resp: 20  BP: 96/63    Intake/Output:     Intake/Output Summary (Last 24 hours) at 2/2/2024 1019  Last data filed at 2/2/2024 0813  Gross per 24 hour   Intake 830 ml   Output 4730 ml   Net -3900 ml       Last 3 Weights   02/02/24 0450 156 lb 9.6 oz (71 kg)   02/01/24 1341 161 lb 12.8 oz (73.4 kg)   02/01/24 1200 161 lb 12.8 oz (73.4 kg)   02/01/24 1059 160 lb (72.6 kg)   10/18/21 0813 168 lb (76.2 kg)   07/02/21 0827 167 lb 9.6 oz (76 kg)       Tele: NSR    Physical Exam:    General: awake, alert, oriented x 3, no acute distress  HEENT: at/nc, perrl, eomi  Neck: JVP to 12 cm H2O, carotids 2+ no bruits.  Cardiac: Tachycardia, irregularly irregular, S1, S2 normal, no murmur, rub or gallop.  Lungs: No rhonchi or dullness.  Normal excursions and effort. Bilateral crackles to mid lung.  Abdomen: Soft, non-tender, non-distended, normal bowel sounds   Extremities: Without clubbing, cyanosis. Peripheral pulses are 2+. 1+ pitting edema to ankles bilaterally.  Neurologic: Alert and oriented, normal affect.  Psych: normal mood and affect  Skin: Warm and dry.   Laboratory/Data:    Labs:         Recent Labs   Lab 02/01/24  1103 02/02/24  0540   WBC 6.7 8.4   HGB 10.5* 11.6*   MCV 99.1 98.3   .0 254.0       Recent Labs   Lab 02/01/24  1103 02/01/24  1817 02/02/24  0540    141 142   K 4.8 4.5 4.4    108 108   CO2 24.0 27.0 26.0   BUN 25* 26* 29*   CREATSERUM 1.24 1.41* 1.44*   CA 9.3 9.5 9.3   * 181* 144*       Recent Labs   Lab 02/01/24  1103   ALT 20   AST 25   ALB 3.8       No results for input(s): \"TROP\" in the last 168 hours.    Allergies:   No Known Allergies    Medications:  Current Facility-Administered Medications   Medication Dose Route Frequency    metoclopramide (Reglan) 5 mg/mL injection 5 mg  5 mg Intravenous Q8H PRN    glucose (Dex4) 15 GM/59ML oral liquid 15 g  15 g Oral  Q15 Min PRN    Or    glucose (Glutose) 40% oral gel 15 g  15 g Oral Q15 Min PRN    Or    glucose-vitamin C (Dex-4) chewable tab 4 tablet  4 tablet Oral Q15 Min PRN    Or    dextrose 50% injection 50 mL  50 mL Intravenous Q15 Min PRN    Or    glucose (Dex4) 15 GM/59ML oral liquid 30 g  30 g Oral Q15 Min PRN    Or    glucose (Glutose) 40% oral gel 30 g  30 g Oral Q15 Min PRN    Or    glucose-vitamin C (Dex-4) chewable tab 8 tablet  8 tablet Oral Q15 Min PRN    enoxaparin (Lovenox) 80 MG/0.8ML SUBQ injection 70 mg  1 mg/kg Subcutaneous q12h    acetaminophen (Tylenol Extra Strength) tab 500 mg  500 mg Oral Q4H PRN    ondansetron (Zofran) 4 MG/2ML injection 4 mg  4 mg Intravenous Q6H PRN    insulin aspart (NovoLOG) 100 Units/mL FlexPen 1-5 Units  1-5 Units Subcutaneous TID CC    furosemide (Lasix) 10 mg/mL injection 40 mg  40 mg Intravenous Daily    atorvastatin (Lipitor) tab 20 mg  20 mg Oral Daily    hydrALAZINE (Apresoline) tab 25 mg  25 mg Oral Q8H NEREIDA    metoprolol tartrate (Lopressor) tab 25 mg  25 mg Oral 2x Daily(Beta Blocker)       Mau Grigsby MD  2/2/2024  10:19 AM

## 2024-02-02 NOTE — CM/SW NOTE
Social work received a consult for a POLST form.    Social work filled out the top portion of the POLST form and placed on the patient's chart for review/signature.    SW/CM to remain available for support and/or discharge planning.     Mima Boswell MSW, LSW  Discharge Planner [id number]

## 2024-02-02 NOTE — OCCUPATIONAL THERAPY NOTE
OCCUPATIONAL THERAPY EVALUATION - INPATIENT     Room Number: 330/330-A  Evaluation Date: 2/2/2024  Type of Evaluation: Initial  Presenting Problem: generalized weakness, a fib with RVR, CHF  Hx CVA    Physician Order: IP Consult to Occupational Therapy  Reason for Therapy: ADL/IADL Dysfunction and Discharge Planning    OCCUPATIONAL THERAPY ASSESSMENT   Patient is a 89 year old male admitted 2/1/2024 for generalized weakness, a fib with RVR, CHF. Per chart, plan for cath procedure on Monday 2/5/24. In this OT evaluation patient presents with the following impairments: strength, balance, activity tolerance, endurance, and functional reach.  These deficits manifest functionally while performing ADLs, functional mobility, and functional transfers. The patient is below baseline and would benefit from skilled inpatient OT to address the above deficits, maximizing patient's ability to return to prior level of function.    The patient's Approx Degree of Impairment: 38.32% has been calculated based on documentation in the [facility] '6 clicks' Inpatient Daily Activity Short Form.  Research supports that patients with this level of impairment may benefit from HH OT. Based on today's evaluation, anticipate patient will be able to return home with HH and increased assist upon discharge. However, evaluation limited d/t elevated HR so discharge recommendation undetermined. Will f/u after cath procedure on Monday.     Addendum: LYNNE Calloway communicated with MD after therapy session to clarify patient's activity parameters. Per Elli's discussion with MD, MD aware of elevated HR and permitted to ambulate short distances in room.     DISCHARGE RECOMMENDATIONS  OT Discharge Recommendations: Undetermined  OT Device Recommendations: TBD    PLAN  OT Treatment Plan: Balance activities;Energy conservation/work simplification techniques;ADL training;Functional transfer training;Endurance training;Patient/Family education;Patient/Family  training;Equipment eval/education;Compensatory technique education       OCCUPATIONAL THERAPY MEDICAL/SOCIAL HISTORY   Problem List  Principal Problem:    Atrial fibrillation, new onset (HCC)  Active Problems:    Congestive heart failure, unspecified HF chronicity, unspecified heart failure type (HCC)    HOME SITUATION  Type of Home: Independent living facility ([facility]  Home Layout: One level  Lives With: Spouse  Drives: Yes  Patient Regularly Uses: Glasses  Use of Assistive Device(s): None    Prior Level of [place]: Independent    SUBJECTIVE  \"I feel okay.\"    OCCUPATIONAL THERAPY EXAMINATION    OBJECTIVE  Precautions: Bed/chair alarm; Hard of hearing  Fall Risk: -- (Moderate fall risk)    PAIN ASSESSMENT  Ratin    ACTIVITY TOLERANCE  Pulse: (!) 152 (fluctuating between 102-152 bpm with transfer at EOB)  Heart Rate Source: Monitor                 COGNITION  Overall Cognitive Status:  WFL - within functional limits    SENSATION  Light touch:  intact    RANGE OF MOTION  Upper extremity ROM is within functional limits     STRENGTH ASSESSMENT  Upper extremity strength is within functional limits     COORDINATION  Gross Motor: WFL   Fine Motor: WFL     ACTIVITIES OF DAILY LIVING ASSESSMENT  AM-PAC ‘6-Clicks’ Inpatient Daily Activity Short Form  How much help from another person does the patient currently need…  -   Putting on and taking off regular lower body clothing?: A Little  -   Bathing (including washing, rinsing, drying)?: A Little  -   Toileting, which includes using toilet, bedpan or urinal? : A Little  -   Putting on and taking off regular upper body clothing?: None  -   Taking care of personal grooming such as brushing teeth?: A Little  -   Eating meals?: None    AM-PAC Score:  Score: 20  Approx Degree of Impairment: 38.32%  Standardized Score (AM-PAC Scale): 42.03  CMS Modifier (G-Code): CJ    BED MOBILITY  Supine to Sit: Independent     FUNCTIONAL TRANSFER ASSESSMENT  Sit <> Stand from EOB:  CGA    FUNCTIONAL MOBILITY  Min assist for side-steps at EOB with handheld assist    FUNCTIONAL ADL ASSESSMENT  Eating: independent seated (per obs)   Grooming: contact guard assist standing at sink (per obs)   UB Dressing: independent seated (per obs)   LB Dressing: min assist  Toileting: min assist seated (per obs)     EDUCATION PROVIDED  Patient : Role of Occupational Therapy; Plan of Care; Discharge Recommendations; Functional Transfer Techniques; Fall Prevention; Posture/Positioning; Proper Body Mechanics  Patient's Response to Education: Verbalized Understanding; Returned Demonstration    Patient End of Session: In bed;Needs met;Call light within reach;RN aware of session/findings;All patient questions and concerns addressed;Family present;Alarm set    OT Goals  Patients self stated goal is: return to PLOF     Patient will complete functional transfer with SUP  Comment:     Patient will complete toileting with SUP  Comment:     Patient will complete LB dressing with SUP  Comment:    Patient will complete item retrieval with SUP  Comment:          Goals  on: 24  Frequency: 3-5x/week    Patient Evaluation Complexity Level:   Occupational Profile/Medical History LOW - Brief history including review of medical or therapy records    Specific performance deficits impacting engagement in ADL/IADL MODERATE  3 - 5 performance deficits   Client Assessment/Performance Deficits MODERATE - Comorbidities and min to mod modifications of tasks    Clinical Decision Making LOW - Analysis of occupational profile, problem-focused assessments, limited treatment options    Overall Complexity LOW     OT Session Time:  Therapeutic Activity: 10 minutes    CARLEE Celis/L  [facility]  #[id number]

## 2024-02-02 NOTE — PLAN OF CARE
Received pt w/ HR Afib 110-120s. Cardiology notified as pt's HR climbed to 120-150s, asymptomatic, BP normotensive. PO metoprolol BID ordered and given, HR down to 100-110s Afib this AM. Plan: IV lasix daily    Call light within reach, fall precautions in place  Problem: Patient Centered Care  Goal: Patient preferences are identified and integrated in the patient's plan of care  Description: Interventions:  - What would you like us to know as we care for you? I live at an independent living facility with my wife.   - Provide timely, complete, and accurate information to patient/family  - Incorporate patient and family knowledge, values, beliefs, and cultural backgrounds into the planning and delivery of care  - Encourage patient/family to participate in care and decision-making at the level they choose  - Honor patient and family perspectives and choices  Outcome: Progressing     Problem: Patient/Family Goals  Goal: Patient/Family Long Term Goal  Description: Patient's Long Term Goal: to go home    Interventions:  - rate control  -diuretics  - See additional Care Plan goals for specific interventions  Outcome: Progressing  Goal: Patient/Family Short Term Goal  Description: Patient's Short Term Goal: to feel better    Interventions:   - rate control  -diuretics  - See additional Care Plan goals for specific interventions  Outcome: Progressing     Problem: CARDIOVASCULAR - ADULT  Goal: Maintains optimal cardiac output and hemodynamic stability  Description: INTERVENTIONS:  - Monitor vital signs, rhythm, and trends  - Monitor for bleeding, hypotension and signs of decreased cardiac output  - Evaluate effectiveness of vasoactive medications to optimize hemodynamic stability  - Monitor arterial and/or venous puncture sites for bleeding and/or hematoma  - Assess quality of pulses, skin color and temperature  - Assess for signs of decreased coronary artery perfusion - ex. Angina  - Evaluate fluid balance, assess for edema,  trend weights  Outcome: Progressing  Goal: Absence of cardiac arrhythmias or at baseline  Description: INTERVENTIONS:  - Continuous cardiac monitoring, monitor vital signs, obtain 12 lead EKG if indicated  - Evaluate effectiveness of antiarrhythmic and heart rate control medications as ordered  - Initiate emergency measures for life threatening arrhythmias  - Monitor electrolytes and administer replacement therapy as ordered  Outcome: Progressing     Problem: GENITOURINARY - ADULT  Goal: Absence of urinary retention  Description: INTERVENTIONS:  - Assess patient’s ability to void and empty bladder  - Monitor intake/output and perform bladder scan as needed  - Follow urinary retention protocol/standard of care  - Consider collaborating with pharmacy to review patient's medication profile  - Implement strategies to promote bladder emptying  Outcome: Progressing     Problem: METABOLIC/FLUID AND ELECTROLYTES - ADULT  Goal: Glucose maintained within prescribed range  Description: INTERVENTIONS:  - Monitor Blood Glucose as ordered  - Assess for signs and symptoms of hyperglycemia and hypoglycemia  - Administer ordered medications to maintain glucose within target range  - Assess barriers to adequate nutritional intake and initiate nutrition consult as needed  - Instruct patient on self management of diabetes  Outcome: Progressing  Goal: Electrolytes maintained within normal limits  Description: INTERVENTIONS:  - Monitor labs and rhythm and assess patient for signs and symptoms of electrolyte imbalances  - Administer electrolyte replacement as ordered  - Monitor response to electrolyte replacements, including rhythm and repeat lab results as appropriate  - Fluid restriction as ordered  - Instruct patient on fluid and nutrition restrictions as appropriate  Outcome: Progressing

## 2024-02-03 LAB
ANION GAP SERPL CALC-SCNC: 7 MMOL/L (ref 0–18)
BUN BLD-MCNC: 30 MG/DL (ref 9–23)
BUN/CREAT SERPL: 20.8 (ref 10–20)
CALCIUM BLD-MCNC: 9.3 MG/DL (ref 8.7–10.4)
CHLORIDE SERPL-SCNC: 107 MMOL/L (ref 98–112)
CO2 SERPL-SCNC: 27 MMOL/L (ref 21–32)
CREAT BLD-MCNC: 1.44 MG/DL
EGFRCR SERPLBLD CKD-EPI 2021: 46 ML/MIN/1.73M2 (ref 60–?)
GLUCOSE BLD-MCNC: 130 MG/DL (ref 70–99)
GLUCOSE BLDC GLUCOMTR-MCNC: 113 MG/DL (ref 70–99)
GLUCOSE BLDC GLUCOMTR-MCNC: 126 MG/DL (ref 70–99)
GLUCOSE BLDC GLUCOMTR-MCNC: 179 MG/DL (ref 70–99)
GLUCOSE BLDC GLUCOMTR-MCNC: 214 MG/DL (ref 70–99)
MAGNESIUM SERPL-MCNC: 1.9 MG/DL (ref 1.6–2.6)
OSMOLALITY SERPL CALC.SUM OF ELEC: 300 MOSM/KG (ref 275–295)
POTASSIUM SERPL-SCNC: 4.4 MMOL/L (ref 3.5–5.1)
SODIUM SERPL-SCNC: 141 MMOL/L (ref 136–145)

## 2024-02-03 PROCEDURE — 82962 GLUCOSE BLOOD TEST: CPT

## 2024-02-03 PROCEDURE — 80048 BASIC METABOLIC PNL TOTAL CA: CPT | Performed by: HOSPITALIST

## 2024-02-03 PROCEDURE — 83735 ASSAY OF MAGNESIUM: CPT | Performed by: HOSPITALIST

## 2024-02-03 RX ORDER — FUROSEMIDE 10 MG/ML
40 INJECTION INTRAMUSCULAR; INTRAVENOUS ONCE
Status: COMPLETED | OUTPATIENT
Start: 2024-02-03 | End: 2024-02-03

## 2024-02-03 RX ORDER — FUROSEMIDE 10 MG/ML
20 INJECTION INTRAMUSCULAR; INTRAVENOUS
Status: DISCONTINUED | OUTPATIENT
Start: 2024-02-03 | End: 2024-02-05

## 2024-02-03 NOTE — PROGRESS NOTES
DM Hospitalist Progress Note     CC: Hospital Follow up    PCP: Donavan Noonan MD       Assessment/Plan:     Principal Problem:    Atrial fibrillation, new onset (HCC)  Active Problems:    Congestive heart failure, unspecified HF chronicity, unspecified heart failure type (HCC)    Mr. Heard is an 89 yo M with PMH of CVA, PVD, HL who presented from [facility] with new onset Afib.      Acute systolic CHF- improved  Fluid overload  - s/p IV lasix, held but now restarted  - TTE with EF 15-20%  - weights, strict I/O  - plan for cardiac cath on Monday    New onset Afib with RVR  - HR 130s initially  - s/p IVP diltiazem  - cardiology consulted  - check TTE, TSH  - start lovenox, plan to switch to DOAC closer to discharge  - PO metoprolol started, more lenient with HR due to systolic CHF     Hx CVA  - continue plavix     PVD/HL  - statin     ACP  - CODE- DNR/full- confirmed with patient  - POA- wife, then daughter  - lives with spouse at independent living     FN:  - IVF: none  - Diet: cardiac     DVT Prophy:SCD, lovenox  Lines: PIV     Dispo: pending clinical course     Outpatient records or previous hospital records reviewed.      Further recommendations pending patient's clinical course.  [facility] hospitalist to continue to follow patient while in house     Patient and/or patient's family given opportunity to ask questions and note understanding and agreeing with therapeutic plan as outlined     Aaliyah Alva MD  [facility] Hospitalist  Answering Service number: [phone]     Subjective:     Episode of shortness of breath overnight that woke him from sleep. Feels better today, no ankle swelling today. Son at bedside.     OBJECTIVE:    Blood pressure 92/63, pulse (!) 121, temperature 97.7 °F (36.5 °C), temperature source Oral, resp. rate 18, height 6' 1\" (1.854 m), weight 156 lb (70.8 kg), SpO2 95%.    Temp:  [97.6 °F (36.4 °C)-98.2 °F (36.8 °C)] 97.7 °F (36.5 °C)  Pulse:  [106-152] 121  Resp:  [18-22] 18  BP: ()/(53-90)  92/63  SpO2:  [94 %-99 %] 95 %      Intake/Output:    Intake/Output Summary (Last 24 hours) at 2/3/2024 1151  Last data filed at 2/3/2024 0900  Gross per 24 hour   Intake 480 ml   Output 300 ml   Net 180 ml       Last 3 Weights   02/03/24 0519 156 lb (70.8 kg)   02/02/24 0450 156 lb 9.6 oz (71 kg)   02/01/24 1341 161 lb 12.8 oz (73.4 kg)   02/01/24 1200 161 lb 12.8 oz (73.4 kg)   02/01/24 1059 160 lb (72.6 kg)   10/18/21 0813 168 lb (76.2 kg)   07/02/21 0827 167 lb 9.6 oz (76 kg)       Exam   GEN: elderly male in NAD  HEENT: EOMI,  Pulm: improved air movement, no crackles  CV: tachycardic, irregularly irregular  ABD: Soft, non-tender, non-distended, +BS  SKIN: warm, dry  EXT: trace edema at ankles     Data Review:       Labs:     Recent Labs   Lab 02/01/24  1103 02/02/24  0540   RBC 3.28* 3.47*   HGB 10.5* 11.6*   HCT 32.5* 34.1*   MCV 99.1 98.3   MCH 32.0 33.4   MCHC 32.3 34.0   RDW 12.7 12.7   NEPRELIM 4.81  --    WBC 6.7 8.4   .0 254.0         Recent Labs   Lab 02/02/24  0540 02/02/24  1528 02/03/24  0630   * 154* 130*   BUN 29* 33* 30*   CREATSERUM 1.44* 1.55* 1.44*   EGFRCR 46* 43* 46*   CA 9.3 9.4 9.3    142 141   K 4.4 5.3* 4.4    109 107   CO2 26.0 28.0 27.0       Recent Labs   Lab 02/01/24  1103   ALT 20   AST 25   ALB 3.8         Imaging:  XR CHEST AP PORTABLE  (CPT=71045)    Result Date: 2/1/2024  CONCLUSION:  1. Normal heart size with moderate pulmonary edema pattern suggesting cardiac failure/fluid overload.  Small bilateral pleural effusions.  I cannot definitely exclude bibasilar pneumonia.  Correlate clinically and follow-up studies are advised.    Dictated by (CST): Damian Bowen MD on 2/01/2024 at 11:18 AM     Finalized by (CST): Damian Bowen MD on 2/01/2024 at 11:19 AM             Meds:      furosemide  40 mg Intravenous Once    furosemide  20 mg Intravenous BID (Diuretic)    enoxaparin  1 mg/kg Subcutaneous q12h    insulin aspart  1-5 Units Subcutaneous TID CC     atorvastatin  20 mg Oral Daily    hydrALAZINE  25 mg Oral Q8H NEREIDA    metoprolol tartrate  25 mg Oral 2x Daily(Beta Blocker)       metoclopramide, ipratropium-albuterol, glucose **OR** glucose **OR** glucose-vitamin C **OR** dextrose **OR** glucose **OR** glucose **OR** glucose-vitamin C, acetaminophen, ondansetron

## 2024-02-03 NOTE — PLAN OF CARE
Pt remains in Afib rates 110-120s. C/o sudden SOB overnight, VSS. Relieved after sitting up for a couple of minutes. MD notified, PRN lubas ordered. Plan: cardiac cath Monday 2/5    Call light within reach, fall precautions in place  Problem: Patient Centered Care  Goal: Patient preferences are identified and integrated in the patient's plan of care  Description: Interventions:  - What would you like us to know as we care for you? I live at an independent living facility with my wife.   - Provide timely, complete, and accurate information to patient/family  - Incorporate patient and family knowledge, values, beliefs, and cultural backgrounds into the planning and delivery of care  - Encourage patient/family to participate in care and decision-making at the level they choose  - Honor patient and family perspectives and choices  Outcome: Progressing     Problem: Patient/Family Goals  Goal: Patient/Family Long Term Goal  Description: Patient's Long Term Goal: to go home    Interventions:  - rate control  -diuretics  - See additional Care Plan goals for specific interventions  Outcome: Progressing  Goal: Patient/Family Short Term Goal  Description: Patient's Short Term Goal: to feel better    Interventions:   - rate control  -diuretics  - See additional Care Plan goals for specific interventions  Outcome: Progressing     Problem: CARDIOVASCULAR - ADULT  Goal: Maintains optimal cardiac output and hemodynamic stability  Description: INTERVENTIONS:  - Monitor vital signs, rhythm, and trends  - Monitor for bleeding, hypotension and signs of decreased cardiac output  - Evaluate effectiveness of vasoactive medications to optimize hemodynamic stability  - Monitor arterial and/or venous puncture sites for bleeding and/or hematoma  - Assess quality of pulses, skin color and temperature  - Assess for signs of decreased coronary artery perfusion - ex. Angina  - Evaluate fluid balance, assess for edema, trend weights  Outcome:  Progressing     Problem: METABOLIC/FLUID AND ELECTROLYTES - ADULT  Goal: Glucose maintained within prescribed range  Description: INTERVENTIONS:  - Monitor Blood Glucose as ordered  - Assess for signs and symptoms of hyperglycemia and hypoglycemia  - Administer ordered medications to maintain glucose within target range  - Assess barriers to adequate nutritional intake and initiate nutrition consult as needed  - Instruct patient on self management of diabetes  Outcome: Progressing  Goal: Electrolytes maintained within normal limits  Description: INTERVENTIONS:  - Monitor labs and rhythm and assess patient for signs and symptoms of electrolyte imbalances  - Administer electrolyte replacement as ordered  - Monitor response to electrolyte replacements, including rhythm and repeat lab results as appropriate  - Fluid restriction as ordered  - Instruct patient on fluid and nutrition restrictions as appropriate  Outcome: Progressing     Problem: CARDIOVASCULAR - ADULT  Goal: Absence of cardiac arrhythmias or at baseline  Description: INTERVENTIONS:  - Continuous cardiac monitoring, monitor vital signs, obtain 12 lead EKG if indicated  - Evaluate effectiveness of antiarrhythmic and heart rate control medications as ordered  - Initiate emergency measures for life threatening arrhythmias  - Monitor electrolytes and administer replacement therapy as ordered  Outcome: Not Progressing

## 2024-02-03 NOTE — PROGRESS NOTES
[facility]    Cardiology Progress Note    Edward Heard Patient Status:  Inpatient    1934 MRN [id number]   Location [facility] 3W/SW Attending Aaliyah Alva MD   Hosp Day # 2 PCP Donavan Noonan MD     Briefly, patient is a 88 yo man with HDL, hx TIA, PVD presenting with CHF and in afib wit RVR. Initially received IV lasix and dilt IV for HR control. He had TTE today, which shows severely depressed LVEF to 15-20% with global hypokinesis.     Impression:  Atrial fibrillation with RVR  With evidence of volume overload.  His rate ranges from 100-130 bpm. Given that he is volume overloaded, would allow for some degree of compensatory tachycardia at this time.  Congestive heart failure 2/2 cardiomyopathy, unspecified  Will need ischemic evaluation once euvolemic with angiogram and RHC.  HDL  TIA  PVD     Recommendations:  Given CHF symptoms last night, will resume IV diuretics and follow renal function. 40mg now and then will try 20 BID after that.   echo reviewed  Avoid AV kirit blocking agents unless HR > 130 bpm given CHF  Continue lovenox subcutaneous. Hold on Monday for angiogram.  Can discontinue clopidgrel to minimize bleeding risk given his age.  Atorvastatin 40mg daily  Metoprolol tartrate 25mg BID started given rates > 145 bpm overnight  Hydralazine 25mg TID to control afterload.  Will start GDMT as able pending improvement in renal function and BP.  Plan for angiogram on 24         Subjective:       PND/orthopnea last night. No chest pain.         Patient Active Problem List   Diagnosis    CHRONIC AIRWAY OBSTRUCTION    Carotid disease, bilateral (HCC)    Uncontrolled type 2 diabetes mellitus with hyperglycemia, without long-term current use of insulin (HCC)    Bilateral pseudophakia    Arcus senilis of cornea, bilateral    Atrial fibrillation, new onset (HCC)    Congestive heart failure, unspecified HF chronicity, unspecified heart failure type ([facility])       Objective:    Temp: 97.7 °F (36.5 °C)  Pulse: 121  Resp: 18  BP: 92/63    Intake/Output:     Intake/Output Summary (Last 24 hours) at 2/3/2024 1007  Last data filed at 2/3/2024 0518  Gross per 24 hour   Intake 480 ml   Output 300 ml   Net 180 ml       Last 3 Weights   02/03/24 0519 156 lb (70.8 kg)   02/02/24 0450 156 lb 9.6 oz (71 kg)   02/01/24 1341 161 lb 12.8 oz (73.4 kg)   02/01/24 1200 161 lb 12.8 oz (73.4 kg)   02/01/24 1059 160 lb (72.6 kg)   10/18/21 0813 168 lb (76.2 kg)   07/02/21 0827 167 lb 9.6 oz (76 kg)       Tele: AF    Physical Exam:    General: awake, alert, oriented x 3, no acute distress  HEENT: at/nc, perrl, eomi  Neck: +JVD  Cardiac: irregularly irregular, S1, S2 normal, no murmur, rub or gallop.  Lungs: No rhonchi or dullness.  Normal excursions and effort. Bilateral crackles to mid lung.  Abdomen: Soft, non-tender, non-distended, normal bowel sounds   Extremities: Without clubbing, cyanosis. . trace-1+ pitting edema to ankles bilaterally.  Neurologic: Alert and oriented, normal affect.  Psych: normal mood and affect  Skin: Warm and dry.   Laboratory/Data:    Labs:         Recent Labs   Lab 02/01/24  1103 02/02/24  0540   WBC 6.7 8.4   HGB 10.5* 11.6*   MCV 99.1 98.3   .0 254.0       Recent Labs   Lab 02/01/24  1103 02/01/24  1817 02/02/24  0540 02/02/24  1528 02/03/24  0630    141 142 142 141   K 4.8 4.5 4.4 5.3* 4.4    108 108 109 107   CO2 24.0 27.0 26.0 28.0 27.0   BUN 25* 26* 29* 33* 30*   CREATSERUM 1.24 1.41* 1.44* 1.55* 1.44*   CA 9.3 9.5 9.3 9.4 9.3   MG  --   --   --   --  1.9   * 181* 144* 154* 130*       Recent Labs   Lab 02/01/24  1103   ALT 20   AST 25   ALB 3.8       No results for input(s): \"TROP\" in the last 168 hours.    Allergies:   No Known Allergies    Medications:

## 2024-02-03 NOTE — PLAN OF CARE
Patient is A&Ox4, room air, 1 with walker. Pt hypotensive throughout shift notified . Pt receive 1 dose of IV lasix now with parameters in place Per . Plan to receive IV lasix BID and have cath on Monday. Call light within reach and fall precautions in place.     Problem: Patient Centered Care  Goal: Patient preferences are identified and integrated in the patient's plan of care  Description: Interventions:  - What would you like us to know as we care for you? I live at an independent living facility with my wife.   - Provide timely, complete, and accurate information to patient/family  - Incorporate patient and family knowledge, values, beliefs, and cultural backgrounds into the planning and delivery of care  - Encourage patient/family to participate in care and decision-making at the level they choose  - Honor patient and family perspectives and choices  Outcome: Progressing     Problem: Patient/Family Goals  Goal: Patient/Family Long Term Goal  Description: Patient's Long Term Goal: to go home    Interventions:  - rate control  -diuretics  - See additional Care Plan goals for specific interventions  Outcome: Progressing  Goal: Patient/Family Short Term Goal  Description: Patient's Short Term Goal: to feel better    Interventions:   - rate control  -diuretics  - See additional Care Plan goals for specific interventions  Outcome: Progressing     Problem: CARDIOVASCULAR - ADULT  Goal: Maintains optimal cardiac output and hemodynamic stability  Description: INTERVENTIONS:  - Monitor vital signs, rhythm, and trends  - Monitor for bleeding, hypotension and signs of decreased cardiac output  - Evaluate effectiveness of vasoactive medications to optimize hemodynamic stability  - Monitor arterial and/or venous puncture sites for bleeding and/or hematoma  - Assess quality of pulses, skin color and temperature  - Assess for signs of decreased coronary artery perfusion - ex. Angina  - Evaluate fluid  balance, assess for edema, trend weights  Outcome: Progressing  Goal: Absence of cardiac arrhythmias or at baseline  Description: INTERVENTIONS:  - Continuous cardiac monitoring, monitor vital signs, obtain 12 lead EKG if indicated  - Evaluate effectiveness of antiarrhythmic and heart rate control medications as ordered  - Initiate emergency measures for life threatening arrhythmias  - Monitor electrolytes and administer replacement therapy as ordered  Outcome: Progressing     Problem: GENITOURINARY - ADULT  Goal: Absence of urinary retention  Description: INTERVENTIONS:  - Assess patient’s ability to void and empty bladder  - Monitor intake/output and perform bladder scan as needed  - Follow urinary retention protocol/standard of care  - Consider collaborating with pharmacy to review patient's medication profile  - Implement strategies to promote bladder emptying  Outcome: Progressing     Problem: METABOLIC/FLUID AND ELECTROLYTES - ADULT  Goal: Glucose maintained within prescribed range  Description: INTERVENTIONS:  - Monitor Blood Glucose as ordered  - Assess for signs and symptoms of hyperglycemia and hypoglycemia  - Administer ordered medications to maintain glucose within target range  - Assess barriers to adequate nutritional intake and initiate nutrition consult as needed  - Instruct patient on self management of diabetes  Outcome: Progressing  Goal: Electrolytes maintained within normal limits  Description: INTERVENTIONS:  - Monitor labs and rhythm and assess patient for signs and symptoms of electrolyte imbalances  - Administer electrolyte replacement as ordered  - Monitor response to electrolyte replacements, including rhythm and repeat lab results as appropriate  - Fluid restriction as ordered  - Instruct patient on fluid and nutrition restrictions as appropriate  Outcome: Progressing  Goal: Hemodynamic stability and optimal renal function maintained  Description: INTERVENTIONS:  - Monitor labs and assess  for signs and symptoms of volume excess or deficit  - Monitor intake, output and patient weight  - Monitor urine specific gravity, serum osmolarity and serum sodium as indicated or ordered  - Monitor response to interventions for patient's volume status, including labs, urine output, blood pressure (other measures as available)  - Encourage oral intake as appropriate  - Instruct patient on fluid and nutrition restrictions as appropriate  Outcome: Progressing

## 2024-02-04 LAB
ANION GAP SERPL CALC-SCNC: 7 MMOL/L (ref 0–18)
BUN BLD-MCNC: 33 MG/DL (ref 9–23)
BUN/CREAT SERPL: 22.3 (ref 10–20)
CALCIUM BLD-MCNC: 9.4 MG/DL (ref 8.7–10.4)
CHLORIDE SERPL-SCNC: 108 MMOL/L (ref 98–112)
CO2 SERPL-SCNC: 28 MMOL/L (ref 21–32)
CREAT BLD-MCNC: 1.48 MG/DL
DEPRECATED RDW RBC AUTO: 45.6 FL (ref 35.1–46.3)
EGFRCR SERPLBLD CKD-EPI 2021: 45 ML/MIN/1.73M2 (ref 60–?)
ERYTHROCYTE [DISTWIDTH] IN BLOOD BY AUTOMATED COUNT: 12.7 % (ref 11–15)
GLUCOSE BLD-MCNC: 139 MG/DL (ref 70–99)
GLUCOSE BLDC GLUCOMTR-MCNC: 111 MG/DL (ref 70–99)
GLUCOSE BLDC GLUCOMTR-MCNC: 129 MG/DL (ref 70–99)
GLUCOSE BLDC GLUCOMTR-MCNC: 129 MG/DL (ref 70–99)
GLUCOSE BLDC GLUCOMTR-MCNC: 178 MG/DL (ref 70–99)
HCT VFR BLD AUTO: 35.1 %
HGB BLD-MCNC: 12 G/DL
MAGNESIUM SERPL-MCNC: 2 MG/DL (ref 1.6–2.6)
MCH RBC QN AUTO: 33.4 PG (ref 26–34)
MCHC RBC AUTO-ENTMCNC: 34.2 G/DL (ref 31–37)
MCV RBC AUTO: 97.8 FL
OSMOLALITY SERPL CALC.SUM OF ELEC: 306 MOSM/KG (ref 275–295)
PLATELET # BLD AUTO: 273 10(3)UL (ref 150–450)
POTASSIUM SERPL-SCNC: 4.2 MMOL/L (ref 3.5–5.1)
RBC # BLD AUTO: 3.59 X10(6)UL
SODIUM SERPL-SCNC: 143 MMOL/L (ref 136–145)
WBC # BLD AUTO: 11.8 X10(3) UL (ref 4–11)

## 2024-02-04 PROCEDURE — 80048 BASIC METABOLIC PNL TOTAL CA: CPT | Performed by: HOSPITALIST

## 2024-02-04 PROCEDURE — 82962 GLUCOSE BLOOD TEST: CPT

## 2024-02-04 PROCEDURE — 85027 COMPLETE CBC AUTOMATED: CPT | Performed by: HOSPITALIST

## 2024-02-04 PROCEDURE — 83735 ASSAY OF MAGNESIUM: CPT | Performed by: HOSPITALIST

## 2024-02-04 NOTE — PROGRESS NOTES
[facility] Hospitalist Progress Note     CC: Hospital Follow up    PCP: Donavan Noonan MD       Assessment/Plan:     Principal Problem:    Atrial fibrillation, new onset (HCC)  Active Problems:    Congestive heart failure, unspecified HF chronicity, unspecified heart failure type (HCC)    Mr. Heard is an 89 yo M with PMH of CVA, PVD, HL who presented from [facility] with new onset Afib and acute systolic CHF. Plan for cardiac cath on 2/5/24.      Acute systolic CHF- improved  Fluid overload  - s/p IV lasix, held but now restarted  - TTE with EF 15-20%  - weights, strict I/O  - plan for cardiac cath on Monday    New onset Afib with RVR  - HR 130s initially  - s/p IVP diltiazem  - cardiology consulted  - check TTE, TSH  - start lovenox, plan to switch to DOAC closer to discharge  - PO metoprolol started, more lenient with HR due to systolic CHF     OZZIE vs. CKD  - Cr 1.2 on admit, up to 1.4 with diuresis  - continue to trend    Hx CVA  - continue plavix     PVD/HL  - statin     ACP  - CODE- DNR/full- confirmed with patient  - POA- wife, then daughter  - lives with spouse at independent living     FN:  - IVF: none  - Diet: cardiac     DVT Prophy:SCD, lovenox  Lines: PIV     Dispo: pending clinical course     Outpatient records or previous hospital records reviewed.      Further recommendations pending patient's clinical course.  [facility] hospitalist to continue to follow patient while in house     Patient and/or patient's family given opportunity to ask questions and note understanding and agreeing with therapeutic plan as outlined     Aaliyah Alva MD  [facility] Hospitalist  Answering Service number: [phone]     Subjective:     No shortness of breath, slept well, no ankle swelling. Family at bedside. Plan for cardiac cath tomorrow.     OBJECTIVE:    Blood pressure 105/77, pulse (!) 132, temperature 97.7 °F (36.5 °C), temperature source Oral, resp. rate 17, height 6' 1\" (1.854 m), weight 154 lb (69.9 kg), SpO2 96%.    Temp:  [97.5 °F  (36.4 °C)-98.6 °F (37 °C)] 97.7 °F (36.5 °C)  Pulse:  [116-143] 132  Resp:  [16-18] 17  BP: ()/(57-82) 105/77  SpO2:  [93 %-99 %] 96 %      Intake/Output:    Intake/Output Summary (Last 24 hours) at 2/4/2024 1342  Last data filed at 2/4/2024 0800  Gross per 24 hour   Intake 420 ml   Output 1850 ml   Net -1430 ml       Last 3 Weights   02/04/24 0517 154 lb (69.9 kg)   02/03/24 0519 156 lb (70.8 kg)   02/02/24 0450 156 lb 9.6 oz (71 kg)   02/01/24 1341 161 lb 12.8 oz (73.4 kg)   02/01/24 1200 161 lb 12.8 oz (73.4 kg)   02/01/24 1059 160 lb (72.6 kg)   10/18/21 0813 168 lb (76.2 kg)   07/02/21 0827 167 lb 9.6 oz (76 kg)       Exam   GEN: elderly male in NAD  HEENT: EOMI,  Pulm: improved air movement, no crackles  CV: tachycardic, irregularly irregular  ABD: Soft, non-tender, non-distended, +BS  SKIN: warm, dry  EXT: no edema     Data Review:       Labs:     Recent Labs   Lab 02/01/24  1103 02/02/24  0540 02/04/24  0642   RBC 3.28* 3.47* 3.59*   HGB 10.5* 11.6* 12.0*   HCT 32.5* 34.1* 35.1*   MCV 99.1 98.3 97.8   MCH 32.0 33.4 33.4   MCHC 32.3 34.0 34.2   RDW 12.7 12.7 12.7   NEPRELIM 4.81  --   --    WBC 6.7 8.4 11.8*   .0 254.0 273.0         Recent Labs   Lab 02/02/24  1528 02/03/24  0630 02/04/24  0642   * 130* 139*   BUN 33* 30* 33*   CREATSERUM 1.55* 1.44* 1.48*   EGFRCR 43* 46* 45*   CA 9.4 9.3 9.4    141 143   K 5.3* 4.4 4.2    107 108   CO2 28.0 27.0 28.0       Recent Labs   Lab 02/01/24  1103   ALT 20   AST 25   ALB 3.8         Imaging:  No results found.      Meds:      furosemide  20 mg Intravenous BID (Diuretic)    enoxaparin  1 mg/kg Subcutaneous q12h    insulin aspart  1-5 Units Subcutaneous TID CC    atorvastatin  20 mg Oral Daily    hydrALAZINE  25 mg Oral Q8H NEREIDA    metoprolol tartrate  25 mg Oral 2x Daily(Beta Blocker)       metoclopramide, ipratropium-albuterol, glucose **OR** glucose **OR** glucose-vitamin C **OR** dextrose **OR** glucose **OR** glucose **OR**  glucose-vitamin C, acetaminophen, ondansetron

## 2024-02-04 NOTE — PLAN OF CARE
Patient is a standby assist with walker. Denies pain overnight. Continues to be tachycardic. Plan is to continue to diurese and have cardiac cath done on Monday.    Problem: Patient Centered Care  Goal: Patient preferences are identified and integrated in the patient's plan of care  Description: Interventions:  - What would you like us to know as we care for you? I live at an independent living facility with my wife.   - Provide timely, complete, and accurate information to patient/family  - Incorporate patient and family knowledge, values, beliefs, and cultural backgrounds into the planning and delivery of care  - Encourage patient/family to participate in care and decision-making at the level they choose  - Honor patient and family perspectives and choices  Outcome: Progressing     Problem: Patient/Family Goals  Goal: Patient/Family Long Term Goal  Description: Patient's Long Term Goal: to go home    Interventions:  - rate control  -diuretics  - See additional Care Plan goals for specific interventions  Outcome: Progressing  Goal: Patient/Family Short Term Goal  Description: Patient's Short Term Goal: to feel better    Interventions:   - rate control  -diuretics  - See additional Care Plan goals for specific interventions  Outcome: Progressing     Problem: CARDIOVASCULAR - ADULT  Goal: Maintains optimal cardiac output and hemodynamic stability  Description: INTERVENTIONS:  - Monitor vital signs, rhythm, and trends  - Monitor for bleeding, hypotension and signs of decreased cardiac output  - Evaluate effectiveness of vasoactive medications to optimize hemodynamic stability  - Monitor arterial and/or venous puncture sites for bleeding and/or hematoma  - Assess quality of pulses, skin color and temperature  - Assess for signs of decreased coronary artery perfusion - ex. Angina  - Evaluate fluid balance, assess for edema, trend weights  Outcome: Progressing  Goal: Absence of cardiac arrhythmias or at  baseline  Description: INTERVENTIONS:  - Continuous cardiac monitoring, monitor vital signs, obtain 12 lead EKG if indicated  - Evaluate effectiveness of antiarrhythmic and heart rate control medications as ordered  - Initiate emergency measures for life threatening arrhythmias  - Monitor electrolytes and administer replacement therapy as ordered  Outcome: Progressing     Problem: GENITOURINARY - ADULT  Goal: Absence of urinary retention  Description: INTERVENTIONS:  - Assess patient’s ability to void and empty bladder  - Monitor intake/output and perform bladder scan as needed  - Follow urinary retention protocol/standard of care  - Consider collaborating with pharmacy to review patient's medication profile  - Implement strategies to promote bladder emptying  Outcome: Progressing     Problem: METABOLIC/FLUID AND ELECTROLYTES - ADULT  Goal: Glucose maintained within prescribed range  Description: INTERVENTIONS:  - Monitor Blood Glucose as ordered  - Assess for signs and symptoms of hyperglycemia and hypoglycemia  - Administer ordered medications to maintain glucose within target range  - Assess barriers to adequate nutritional intake and initiate nutrition consult as needed  - Instruct patient on self management of diabetes  Outcome: Progressing  Goal: Electrolytes maintained within normal limits  Description: INTERVENTIONS:  - Monitor labs and rhythm and assess patient for signs and symptoms of electrolyte imbalances  - Administer electrolyte replacement as ordered  - Monitor response to electrolyte replacements, including rhythm and repeat lab results as appropriate  - Fluid restriction as ordered  - Instruct patient on fluid and nutrition restrictions as appropriate  Outcome: Progressing  Goal: Hemodynamic stability and optimal renal function maintained  Description: INTERVENTIONS:  - Monitor labs and assess for signs and symptoms of volume excess or deficit  - Monitor intake, output and patient weight  - Monitor  urine specific gravity, serum osmolarity and serum sodium as indicated or ordered  - Monitor response to interventions for patient's volume status, including labs, urine output, blood pressure (other measures as available)  - Encourage oral intake as appropriate  - Instruct patient on fluid and nutrition restrictions as appropriate  Outcome: Progressing

## 2024-02-04 NOTE — PROGRESS NOTES
[facility]    Cardiology Progress Note    Edward Heard Patient Status:  Inpatient    1934 MRN [id number]   Location [facility] 3W/SW Attending Aaliyah Alva MD   Hosp Day # 3 PCP Donavan Noonan MD     Briefly, patient is a 88 yo man with HDL, hx TIA, PVD presenting with CHF and in afib wit RVR. Initially received IV lasix and dilt IV for HR control. He had TTE today, which shows severely depressed LVEF to 15-20% with global hypokinesis.     Impression:  Atrial fibrillation with RVR  With evidence of volume overload.  His rate ranges from 100-130 bpm. Given that he is volume overloaded, would allow for some degree of compensatory tachycardia at this time.  Congestive heart failure 2/2 cardiomyopathy, unspecified  Will need ischemic evaluation once euvolemic with angiogram and RHC.  HDL  TIA  PVD     Recommendations:  IV lasix, dyspnea improved. Hypotension yesterday after first dose of 40mg; tolerates 20mg better  echo reviewed  Avoid AV kirit blocking agents unless HR > 130 bpm given CHF  Continue lovenox subcutaneous. Hold on Monday for angiogram.  Can discontinue clopidgrel to minimize bleeding risk given his age.  Atorvastatin 40mg daily  Metoprolol tartrate 25mg BID started given rates > 145 bpm overnight  Hydralazine 25mg TID to control afterload.  Will start GDMT as able pending improvement in renal function and BP.  Plan for angiogram on 24 (R/[facility])         Subjective:       PND/orthopnea last night. No chest pain.         Patient Active Problem List   Diagnosis    CHRONIC AIRWAY OBSTRUCTION    Carotid disease, bilateral (HCC)    Uncontrolled type 2 diabetes mellitus with hyperglycemia, without long-term current use of insulin (HCC)    Bilateral pseudophakia    Arcus senilis of cornea, bilateral    Atrial fibrillation, new onset (HCC)    Congestive heart failure, unspecified HF chronicity, unspecified heart failure type ([facility])       Objective:   Temp: 97.7 °F (36.5  °C)  Pulse: 132  Resp: 17  BP: 105/77    Intake/Output:     Intake/Output Summary (Last 24 hours) at 2/4/2024 1051  Last data filed at 2/4/2024 0800  Gross per 24 hour   Intake 660 ml   Output 2100 ml   Net -1440 ml       Last 3 Weights   02/04/24 0517 154 lb (69.9 kg)   02/03/24 0519 156 lb (70.8 kg)   02/02/24 0450 156 lb 9.6 oz (71 kg)   02/01/24 1341 161 lb 12.8 oz (73.4 kg)   02/01/24 1200 161 lb 12.8 oz (73.4 kg)   02/01/24 1059 160 lb (72.6 kg)   10/18/21 0813 168 lb (76.2 kg)   07/02/21 0827 167 lb 9.6 oz (76 kg)       Tele: AF    Physical Exam:    General: awake, alert, oriented x 3, no acute distress  HEENT: at/nc, perrl, eomi  Neck: supple  Cardiac: irregularly irregular, S1, S2 normal, no murmur, rub or gallop.  Lungs: No rhonchi or dullness.  Normal excursions and effort.   Abdomen: Soft, non-tender, non-distended, normal bowel sounds   Extremities: Without clubbing, cyanosis. . trace pitting edema to ankles bilaterally.  Neurologic: Alert and oriented, normal affect.  Psych: normal mood and affect  Skin: Warm and dry.   Laboratory/Data:    Labs:         Recent Labs   Lab 02/01/24  1103 02/02/24  0540 02/04/24  0642   WBC 6.7 8.4 11.8*   HGB 10.5* 11.6* 12.0*   MCV 99.1 98.3 97.8   .0 254.0 273.0       Recent Labs   Lab 02/01/24  1817 02/02/24  0540 02/02/24  1528 02/03/24  0630 02/04/24  0642    142 142 141 143   K 4.5 4.4 5.3* 4.4 4.2    108 109 107 108   CO2 27.0 26.0 28.0 27.0 28.0   BUN 26* 29* 33* 30* 33*   CREATSERUM 1.41* 1.44* 1.55* 1.44* 1.48*   CA 9.5 9.3 9.4 9.3 9.4   MG  --   --   --  1.9 2.0   * 144* 154* 130* 139*       Recent Labs   Lab 02/01/24  1103   ALT 20   AST 25   ALB 3.8       No results for input(s): \"TROP\" in the last 168 hours.      ECHO:  1. Study data: Atrial fibrillation   2. Left ventricle: The cavity size was at the upper limits of normal. Wall      thickness was normal. Systolic function was markedly reduced. The      estimated ejection fraction  was 15-20%, by visual assessment. Unable to      assess LV diastolic function due to heart rhythm.   3. Right ventricle: Systolic function was mildly reduced.   4. Left atrium: The left atrial volume was mildly to moderately increased.   5. Right atrium: The estimated central venous pressure is 8mm Hg.   6. Mitral valve: There was mild to moderate regurgitation.   Impressions:  No previous study was available for comparison.   *           Allergies:   No Known Allergies    Medications:

## 2024-02-05 ENCOUNTER — APPOINTMENT (OUTPATIENT)
Dept: INTERVENTIONAL RADIOLOGY/VASCULAR | Facility: HOSPITAL | Age: 89
End: 2024-02-05
Attending: INTERNAL MEDICINE
Payer: MEDICARE

## 2024-02-05 LAB
ANION GAP SERPL CALC-SCNC: 6 MMOL/L (ref 0–18)
ANION GAP SERPL CALC-SCNC: 6 MMOL/L (ref 0–18)
BUN BLD-MCNC: 31 MG/DL (ref 9–23)
BUN BLD-MCNC: 32 MG/DL (ref 9–23)
BUN/CREAT SERPL: 19.9 (ref 10–20)
BUN/CREAT SERPL: 22.1 (ref 10–20)
CALCIUM BLD-MCNC: 8.9 MG/DL (ref 8.7–10.4)
CALCIUM BLD-MCNC: 9.2 MG/DL (ref 8.7–10.4)
CHLORIDE SERPL-SCNC: 109 MMOL/L (ref 98–112)
CHLORIDE SERPL-SCNC: 109 MMOL/L (ref 98–112)
CO2 SERPL-SCNC: 26 MMOL/L (ref 21–32)
CO2 SERPL-SCNC: 27 MMOL/L (ref 21–32)
CREAT BLD-MCNC: 1.4 MG/DL
CREAT BLD-MCNC: 1.61 MG/DL
DEPRECATED RDW RBC AUTO: 45.1 FL (ref 35.1–46.3)
EGFRCR SERPLBLD CKD-EPI 2021: 41 ML/MIN/1.73M2 (ref 60–?)
EGFRCR SERPLBLD CKD-EPI 2021: 48 ML/MIN/1.73M2 (ref 60–?)
ERYTHROCYTE [DISTWIDTH] IN BLOOD BY AUTOMATED COUNT: 12.6 % (ref 11–15)
GLUCOSE BLD-MCNC: 130 MG/DL (ref 70–99)
GLUCOSE BLD-MCNC: 140 MG/DL (ref 70–99)
GLUCOSE BLDC GLUCOMTR-MCNC: 125 MG/DL (ref 70–99)
GLUCOSE BLDC GLUCOMTR-MCNC: 131 MG/DL (ref 70–99)
GLUCOSE BLDC GLUCOMTR-MCNC: 133 MG/DL (ref 70–99)
GLUCOSE BLDC GLUCOMTR-MCNC: 237 MG/DL (ref 70–99)
HCT VFR BLD AUTO: 31.9 %
HGB BLD-MCNC: 10.8 G/DL
MAGNESIUM SERPL-MCNC: 2 MG/DL (ref 1.6–2.6)
MCH RBC QN AUTO: 33 PG (ref 26–34)
MCHC RBC AUTO-ENTMCNC: 33.9 G/DL (ref 31–37)
MCV RBC AUTO: 97.6 FL
OSMOLALITY SERPL CALC.SUM OF ELEC: 300 MOSM/KG (ref 275–295)
OSMOLALITY SERPL CALC.SUM OF ELEC: 303 MOSM/KG (ref 275–295)
PLATELET # BLD AUTO: 234 10(3)UL (ref 150–450)
POTASSIUM SERPL-SCNC: 3.8 MMOL/L (ref 3.5–5.1)
POTASSIUM SERPL-SCNC: 5 MMOL/L (ref 3.5–5.1)
RBC # BLD AUTO: 3.27 X10(6)UL
SODIUM SERPL-SCNC: 141 MMOL/L (ref 136–145)
SODIUM SERPL-SCNC: 142 MMOL/L (ref 136–145)
WBC # BLD AUTO: 9 X10(3) UL (ref 4–11)

## 2024-02-05 PROCEDURE — B2111ZZ FLUOROSCOPY OF MULTIPLE CORONARY ARTERIES USING LOW OSMOLAR CONTRAST: ICD-10-PCS | Performed by: INTERNAL MEDICINE

## 2024-02-05 PROCEDURE — 85027 COMPLETE CBC AUTOMATED: CPT | Performed by: HOSPITALIST

## 2024-02-05 PROCEDURE — 80048 BASIC METABOLIC PNL TOTAL CA: CPT | Performed by: HOSPITALIST

## 2024-02-05 PROCEDURE — 93460 R&L HRT ART/VENTRICLE ANGIO: CPT | Performed by: INTERNAL MEDICINE

## 2024-02-05 PROCEDURE — 4A023N8 MEASUREMENT OF CARDIAC SAMPLING AND PRESSURE, BILATERAL, PERCUTANEOUS APPROACH: ICD-10-PCS | Performed by: INTERNAL MEDICINE

## 2024-02-05 PROCEDURE — 82962 GLUCOSE BLOOD TEST: CPT

## 2024-02-05 PROCEDURE — 83735 ASSAY OF MAGNESIUM: CPT | Performed by: HOSPITALIST

## 2024-02-05 PROCEDURE — 99152 MOD SED SAME PHYS/QHP 5/>YRS: CPT | Performed by: INTERNAL MEDICINE

## 2024-02-05 PROCEDURE — 99153 MOD SED SAME PHYS/QHP EA: CPT | Performed by: INTERNAL MEDICINE

## 2024-02-05 PROCEDURE — 80048 BASIC METABOLIC PNL TOTAL CA: CPT | Performed by: INTERNAL MEDICINE

## 2024-02-05 RX ORDER — VERAPAMIL HYDROCHLORIDE 2.5 MG/ML
INJECTION, SOLUTION INTRAVENOUS
Status: COMPLETED
Start: 2024-02-05 | End: 2024-02-05

## 2024-02-05 RX ORDER — METOPROLOL TARTRATE 50 MG/1
50 TABLET, FILM COATED ORAL
Status: DISCONTINUED | OUTPATIENT
Start: 2024-02-05 | End: 2024-02-10

## 2024-02-05 RX ORDER — ASPIRIN 81 MG/1
TABLET, CHEWABLE ORAL
Status: COMPLETED
Start: 2024-02-05 | End: 2024-02-05

## 2024-02-05 RX ORDER — DIGOXIN 0.25 MG/ML
250 INJECTION INTRAMUSCULAR; INTRAVENOUS ONCE
Status: COMPLETED | OUTPATIENT
Start: 2024-02-05 | End: 2024-02-05

## 2024-02-05 RX ORDER — HEPARIN SODIUM 1000 [USP'U]/ML
INJECTION, SOLUTION INTRAVENOUS; SUBCUTANEOUS
Status: COMPLETED
Start: 2024-02-05 | End: 2024-02-05

## 2024-02-05 RX ORDER — MIDAZOLAM HYDROCHLORIDE 1 MG/ML
INJECTION INTRAMUSCULAR; INTRAVENOUS
Status: COMPLETED
Start: 2024-02-05 | End: 2024-02-05

## 2024-02-05 RX ORDER — LIDOCAINE HYDROCHLORIDE 20 MG/ML
INJECTION, SOLUTION EPIDURAL; INFILTRATION; INTRACAUDAL; PERINEURAL
Status: COMPLETED
Start: 2024-02-05 | End: 2024-02-05

## 2024-02-05 RX ORDER — NITROGLYCERIN 20 MG/100ML
INJECTION INTRAVENOUS
Status: COMPLETED
Start: 2024-02-05 | End: 2024-02-05

## 2024-02-05 NOTE — PLAN OF CARE
Mr. Edward Heard is A&O x 4 with some forgetfulness.  Lives at [facility] with spouse.    Stand-by assist with walker.  Continent of bowel and bladder.  VSS:  stable.  Denies pain.  Plan:  diurese and beta blocker as tolerated, cath on Monday  Will continue to monitor and treat.    Problem: Patient Centered Care  Goal: Patient preferences are identified and integrated in the patient's plan of care  Description: Interventions:  - What would you like us to know as we care for you? I live at an independent living facility with my wife.   - Provide timely, complete, and accurate information to patient/family  - Incorporate patient and family knowledge, values, beliefs, and cultural backgrounds into the planning and delivery of care  - Encourage patient/family to participate in care and decision-making at the level they choose  - Honor patient and family perspectives and choices  Outcome: Progressing     Problem: Patient/Family Goals  Goal: Patient/Family Long Term Goal  Description: Patient's Long Term Goal: to go home    Interventions:  - rate control  -diuretics  - See additional Care Plan goals for specific interventions  Outcome: Progressing  Goal: Patient/Family Short Term Goal  Description: Patient's Short Term Goal: to feel better    Interventions:   - rate control  -diuretics  - See additional Care Plan goals for specific interventions  Outcome: Progressing     Problem: CARDIOVASCULAR - ADULT  Goal: Maintains optimal cardiac output and hemodynamic stability  Description: INTERVENTIONS:  - Monitor vital signs, rhythm, and trends  - Monitor for bleeding, hypotension and signs of decreased cardiac output  - Evaluate effectiveness of vasoactive medications to optimize hemodynamic stability  - Monitor arterial and/or venous puncture sites for bleeding and/or hematoma  - Assess quality of pulses, skin color and temperature  - Assess for signs of decreased coronary artery perfusion - ex. Angina  -  Evaluate fluid balance, assess for edema, trend weights  Outcome: Progressing  Goal: Absence of cardiac arrhythmias or at baseline  Description: INTERVENTIONS:  - Continuous cardiac monitoring, monitor vital signs, obtain 12 lead EKG if indicated  - Evaluate effectiveness of antiarrhythmic and heart rate control medications as ordered  - Initiate emergency measures for life threatening arrhythmias  - Monitor electrolytes and administer replacement therapy as ordered  Outcome: Progressing     Problem: GENITOURINARY - ADULT  Goal: Absence of urinary retention  Description: INTERVENTIONS:  - Assess patient’s ability to void and empty bladder  - Monitor intake/output and perform bladder scan as needed  - Follow urinary retention protocol/standard of care  - Consider collaborating with pharmacy to review patient's medication profile  - Implement strategies to promote bladder emptying  Outcome: Progressing     Problem: METABOLIC/FLUID AND ELECTROLYTES - ADULT  Goal: Glucose maintained within prescribed range  Description: INTERVENTIONS:  - Monitor Blood Glucose as ordered  - Assess for signs and symptoms of hyperglycemia and hypoglycemia  - Administer ordered medications to maintain glucose within target range  - Assess barriers to adequate nutritional intake and initiate nutrition consult as needed  - Instruct patient on self management of diabetes  Outcome: Progressing  Goal: Electrolytes maintained within normal limits  Description: INTERVENTIONS:  - Monitor labs and rhythm and assess patient for signs and symptoms of electrolyte imbalances  - Administer electrolyte replacement as ordered  - Monitor response to electrolyte replacements, including rhythm and repeat lab results as appropriate  - Fluid restriction as ordered  - Instruct patient on fluid and nutrition restrictions as appropriate  Outcome: Progressing  Goal: Hemodynamic stability and optimal renal function maintained  Description: INTERVENTIONS:  - Monitor  labs and assess for signs and symptoms of volume excess or deficit  - Monitor intake, output and patient weight  - Monitor urine specific gravity, serum osmolarity and serum sodium as indicated or ordered  - Monitor response to interventions for patient's volume status, including labs, urine output, blood pressure (other measures as available)  - Encourage oral intake as appropriate  - Instruct patient on fluid and nutrition restrictions as appropriate  Outcome: Progressing     Problem: HEMATOLOGIC - ADULT  Goal: Maintains hematologic stability  Description: INTERVENTIONS  - Assess for signs and symptoms of bleeding or hemorrhage  - Monitor labs and vital signs for trends  - Administer supportive blood products/factors, fluids and medications as ordered and appropriate  - Administer supportive blood products/factors as ordered and appropriate  Outcome: Progressing  Goal: Free from bleeding injury  Description: (Example usage: patient with low platelets)  INTERVENTIONS:  - Avoid intramuscular injections, enemas and rectal medication administration  - Ensure safe mobilization of patient  - Hold pressure on venipuncture sites to achieve adequate hemostasis  - Assess for signs and symptoms of internal bleeding  - Monitor lab trends  - Patient is to report abnormal signs of bleeding to staff  - Avoid use of toothpicks and dental floss  - Use electric shaver for shaving  - Use soft bristle tooth brush  - Limit straining and forceful nose blowing  Outcome: Progressing

## 2024-02-05 NOTE — PROCEDURES
[facility]  Cardiac Catheterization Report  Right and Left heart catheterization    (S): Mau Grigsby MD    PREOPERATIVE DIAGNOSIS: Cardiomyopathy    POSTOPERATIVE DIAGNOSIS: Coronary artery disease, cardiomyopathy    PROCEDURE PERFORMED: Left heart catheterization with selective coronary arteriography; right heart catheterization     CONSENT: The risks, benefits, and alternatives of cardiac catheterization were discussed with the patient.  The risks included, but were not limited to: bleeding, limb ischemia, deep venous thrombosis, allergic reaction, infection, stroke, myocardial infarction,  and death.  Benefits of the procedure included: symptomatic improvement, diagnosis of heart disease and prevention of myocardial infarction.  Alternatives to the procedure included: not performing cardiac catheterization, treatment with medications only, and observation. The patient verbalized understanding and agreed to proceed with the procedure.     CATHETERS: 6F JL 3.5, JR4, 5F [place]    VASCULAR CLOSURE: TR Band    SEDATION: 1 mg Versed, 25 mcg Fentanyl     DESCRIPTION OF PROCEDURE:  The patient was brought to the cardiac catheterization laboratory.      Once local anesthesia was achieved, sedation was administered. The IV was maintained by the RN and moderate conscious sedation with Versed and Fentanyl was given. The patient was assessed and monitoring of oxygen, heart rate and blood pressure by nurse and myself during the exam 1605 (time of 1st dose administered when I was present) to 1633 (procedure end time).     The right radial area was prepped and draped in a sterile manner and anesthetized with 2% lidocaine.  The right radial artery was accessed, and a 6-[place] Glidesheath was placed under modified seldinger technique.  Left and right selective coronary angiography was performed using the above catheters.  A JR4 catheter was used to cross the aortic valve, measure left ventricular  pressure.  At the conclusion of the study, the right radial artery hemostasis was performed using a radial band.      Right heart catheterization was done via the right brachial vein. The right brachial vein was accessed under ultrasound guidance and with a 5-F Glidesheath. [place] was then inserted, and RHC performed.  Hemostasis was achieved with manual pressure.     FINDINGS:      HEMODYNAMICS:    The left ventricular end diastolic pressure is 6 mmHg. There was no significant gradient upon pullback across the aortic valve.    RA 4/4/3  RV 25/1/4  PCWP 16/13/12  PA 23/13/18  Ao 97/36/58    PA sat 52%    Laurie Cardiac Output 4.7 L/min  Laurie Cardiac Index 2.4 L/min/m2    PVR 1.3 Woods U   Dynes    Thermo Cardiac Output 3.9  Thermo Cardiac index 2    PVR 1.5 [place] U  SVR 1179 Dynes       ANGIOGRAPHY:      Left main: Severe calcified ostial-proximal stenosis of the left main coronary artery.    LAD: Large vessel that wraps the apex. Luminal irregularities.    Ramus: Large vessel with luminal irregularities.    Left circumflex: Large vessel giving rise to a large OM branch. Severe tubular 80% lesion of the ostial to proximal OM.    RCA: Anterior takeoff. Large caliber vessel that gives rise to the PDA. Luminal irregularities.    CONCLUSIONS:  1. Coronary artery disease as described above.  2. Normal cardiac index  3. Normal right and left sided filling pressures.      MD Ti Shery  2/5/2024  4:35 PM

## 2024-02-05 NOTE — PLAN OF CARE
Patient A+Ox4. Standby assist with walker. NPO since midnight. Plan cardiac cath today.    Problem: Patient Centered Care  Goal: Patient preferences are identified and integrated in the patient's plan of care  Description: Interventions:  - What would you like us to know as we care for you? I live at an independent living facility with my wife.   - Provide timely, complete, and accurate information to patient/family  - Incorporate patient and family knowledge, values, beliefs, and cultural backgrounds into the planning and delivery of care  - Encourage patient/family to participate in care and decision-making at the level they choose  - Honor patient and family perspectives and choices  Outcome: Progressing     Problem: Patient/Family Goals  Goal: Patient/Family Long Term Goal  Description: Patient's Long Term Goal: to go home    Interventions:  - rate control  -diuretics  - See additional Care Plan goals for specific interventions  Outcome: Progressing  Goal: Patient/Family Short Term Goal  Description: Patient's Short Term Goal: to feel better    Interventions:   - rate control  -diuretics  - See additional Care Plan goals for specific interventions  Outcome: Progressing     Problem: CARDIOVASCULAR - ADULT  Goal: Maintains optimal cardiac output and hemodynamic stability  Description: INTERVENTIONS:  - Monitor vital signs, rhythm, and trends  - Monitor for bleeding, hypotension and signs of decreased cardiac output  - Evaluate effectiveness of vasoactive medications to optimize hemodynamic stability  - Monitor arterial and/or venous puncture sites for bleeding and/or hematoma  - Assess quality of pulses, skin color and temperature  - Assess for signs of decreased coronary artery perfusion - ex. Angina  - Evaluate fluid balance, assess for edema, trend weights  Outcome: Progressing  Goal: Absence of cardiac arrhythmias or at baseline  Description: INTERVENTIONS:  - Continuous cardiac monitoring, monitor vital  signs, obtain 12 lead EKG if indicated  - Evaluate effectiveness of antiarrhythmic and heart rate control medications as ordered  - Initiate emergency measures for life threatening arrhythmias  - Monitor electrolytes and administer replacement therapy as ordered  Outcome: Progressing     Problem: GENITOURINARY - ADULT  Goal: Absence of urinary retention  Description: INTERVENTIONS:  - Assess patient’s ability to void and empty bladder  - Monitor intake/output and perform bladder scan as needed  - Follow urinary retention protocol/standard of care  - Consider collaborating with pharmacy to review patient's medication profile  - Implement strategies to promote bladder emptying  Outcome: Progressing     Problem: METABOLIC/FLUID AND ELECTROLYTES - ADULT  Goal: Glucose maintained within prescribed range  Description: INTERVENTIONS:  - Monitor Blood Glucose as ordered  - Assess for signs and symptoms of hyperglycemia and hypoglycemia  - Administer ordered medications to maintain glucose within target range  - Assess barriers to adequate nutritional intake and initiate nutrition consult as needed  - Instruct patient on self management of diabetes  Outcome: Progressing  Goal: Electrolytes maintained within normal limits  Description: INTERVENTIONS:  - Monitor labs and rhythm and assess patient for signs and symptoms of electrolyte imbalances  - Administer electrolyte replacement as ordered  - Monitor response to electrolyte replacements, including rhythm and repeat lab results as appropriate  - Fluid restriction as ordered  - Instruct patient on fluid and nutrition restrictions as appropriate  Outcome: Progressing  Goal: Hemodynamic stability and optimal renal function maintained  Description: INTERVENTIONS:  - Monitor labs and assess for signs and symptoms of volume excess or deficit  - Monitor intake, output and patient weight  - Monitor urine specific gravity, serum osmolarity and serum sodium as indicated or ordered  -  Monitor response to interventions for patient's volume status, including labs, urine output, blood pressure (other measures as available)  - Encourage oral intake as appropriate  - Instruct patient on fluid and nutrition restrictions as appropriate  Outcome: Progressing     Problem: HEMATOLOGIC - ADULT  Goal: Maintains hematologic stability  Description: INTERVENTIONS  - Assess for signs and symptoms of bleeding or hemorrhage  - Monitor labs and vital signs for trends  - Administer supportive blood products/factors, fluids and medications as ordered and appropriate  - Administer supportive blood products/factors as ordered and appropriate  Outcome: Progressing  Goal: Free from bleeding injury  Description: (Example usage: patient with low platelets)  INTERVENTIONS:  - Avoid intramuscular injections, enemas and rectal medication administration  - Ensure safe mobilization of patient  - Hold pressure on venipuncture sites to achieve adequate hemostasis  - Assess for signs and symptoms of internal bleeding  - Monitor lab trends  - Patient is to report abnormal signs of bleeding to staff  - Avoid use of toothpicks and dental floss  - Use electric shaver for shaving  - Use soft bristle tooth brush  - Limit straining and forceful nose blowing  Outcome: Progressing

## 2024-02-05 NOTE — PROGRESS NOTES
[facility]    Cardiology Progress Note    Edward Heard Patient Status:  Inpatient    1934 MRN [id number]   Location [facility] 3W/SW Attending Aaliyah Alva MD   Hosp Day # 4 PCP Donavan Noonan MD     Briefly, patient is a 89 yo man with HDL, hx TIA, PVD presenting with CHF and in afib wit RVR. Initially received IV lasix and dilt IV for HR control. He had TTE today, which shows severely depressed LVEF to 15-20% with global hypokinesis.     Impression:  Atrial fibrillation with RVR  Euvolemic/mildly hypovolemic at this time given rise in Cr from the weekend. His rate ranges from 100-130 bpm but occasionally rises to 150 bpm.   Congestive heart failure 2/2 cardiomyopathy (LVEF 10-15), unspecified etiology  Euvolemic/mildly hypovolemic at this time given rise in Cr from the weekend.. Will need ischemic evaluation once euvolemic with L/RHCs  HDL  TIA  PVD     Recommendations:  discontinue lasix. Will give 500 ml fluids today and repeat BMP in the afternoon. If improved, will proceed to cath. Otherwise, will defer and await improvement in renal function.  Continue lovenox subcutaneous. Hold on Monday for angiogram.  Can discontinue clopidgrel to minimize bleeding risk given his age.  Atorvastatin 40mg daily  Metoprolol tartrate 25mg BID started given rates > 145 bpm overnight. Will uptitrate to 50mg BID if rates are persistently elevated.  Hydralazine 25mg TID to control afterload. Hold for SBP < 100 mmHg.  Will start GDMT as able pending improvement in renal function and BP.  Plan for angiogram pending renal function.       Subjective:     PND/orthopnea last night. No chest pain. Cr up to 1.6 today. Denies LE swelling, MAIER. States swelling is gone. I/O with net negative 5.6 L for the admission, patient down 8 lbs.      Patient Active Problem List   Diagnosis    CHRONIC AIRWAY OBSTRUCTION    Carotid disease, bilateral (HCC)    Uncontrolled type 2 diabetes mellitus with  hyperglycemia, without long-term current use of insulin (HCC)    Bilateral pseudophakia    Arcus senilis of cornea, bilateral    Atrial fibrillation, new onset (HCC)    Congestive heart failure, unspecified HF chronicity, unspecified heart failure type (HCC)       Objective:   Temp: 97.9 °F (36.6 °C)  Pulse: 125  Resp: 16  BP: 104/67    Intake/Output:     Intake/Output Summary (Last 24 hours) at 2/5/2024 0944  Last data filed at 2/5/2024 0900  Gross per 24 hour   Intake 780 ml   Output 1675 ml   Net -895 ml       Last 3 Weights   02/05/24 0500 152 lb 8 oz (69.2 kg)   02/04/24 0517 154 lb (69.9 kg)   02/03/24 0519 156 lb (70.8 kg)   02/02/24 0450 156 lb 9.6 oz (71 kg)   02/01/24 1341 161 lb 12.8 oz (73.4 kg)   02/01/24 1200 161 lb 12.8 oz (73.4 kg)   02/01/24 1059 160 lb (72.6 kg)   10/18/21 0813 168 lb (76.2 kg)   07/02/21 0827 167 lb 9.6 oz (76 kg)       Tele: AF    Physical Exam:    General: awake, alert, oriented x 3, no acute distress  HEENT: at/nc, perrl, eomi  Neck: supple  Cardiac: irregularly irregular, S1, S2 normal, no murmur, rub or gallop.  Lungs: No rhonchi or dullness.  Normal excursions and effort.   Abdomen: Soft, non-tender, non-distended, normal bowel sounds   Extremities: Without clubbing, cyanosis. . trace pitting edema to ankles bilaterally.  Neurologic: Alert and oriented, normal affect.  Psych: normal mood and affect  Skin: Warm and dry.   Laboratory/Data:    Labs:         Recent Labs   Lab 02/01/24  1103 02/02/24  0540 02/04/24  0642 02/05/24  0616   WBC 6.7 8.4 11.8* 9.0   HGB 10.5* 11.6* 12.0* 10.8*   MCV 99.1 98.3 97.8 97.6   .0 254.0 273.0 234.0       Recent Labs   Lab 02/02/24  0540 02/02/24  1528 02/03/24  0630 02/04/24  0642 02/05/24  0616    142 141 143 142   K 4.4 5.3* 4.4 4.2 3.8    109 107 108 109   CO2 26.0 28.0 27.0 28.0 27.0   BUN 29* 33* 30* 33* 32*   CREATSERUM 1.44* 1.55* 1.44* 1.48* 1.61*   CA 9.3 9.4 9.3 9.4 8.9   MG  --   --  1.9 2.0 2.0   * 154*  130* 139* 140*       Recent Labs   Lab 02/01/24  1103   ALT 20   AST 25   ALB 3.8       No results for input(s): \"TROP\" in the last 168 hours.      ECHO:  1. Study data: Atrial fibrillation   2. Left ventricle: The cavity size was at the upper limits of normal. Wall      thickness was normal. Systolic function was markedly reduced. The      estimated ejection fraction was 15-20%, by visual assessment. Unable to      assess LV diastolic function due to heart rhythm.   3. Right ventricle: Systolic function was mildly reduced.   4. Left atrium: The left atrial volume was mildly to moderately increased.   5. Right atrium: The estimated central venous pressure is 8mm Hg.   6. Mitral valve: There was mild to moderate regurgitation.   Impressions:  No previous study was available for comparison.   *           Allergies:   No Known Allergies    Medications:    Mau Grigsby MD  Interventional Cardiology

## 2024-02-05 NOTE — PROGRESS NOTES
[facility] [place] [facility] Progress Note     CC: Hospital Follow up    PCP: Donavan Noonan MD       Assessment/Plan:     Principal Problem:    Atrial fibrillation, new onset (HCC)  Active Problems:    Congestive heart failure, unspecified HF chronicity, unspecified heart failure type (HCC)    Mr. Heard is an 88 yo M with PMH of CVA, PVD, HL who presented from [facility] with new onset Afib and acute systolic CHF. Plan for cardiac cath on 2/5/24.      Acute systolic CHF- improved  Fluid overload  - s/p IV lasix, held but now restarted  - TTE with EF 15-20%  - weights, strict I/O  - plan for cardiac cath today pending repeat Cr    New onset Afib with RVR  - HR 130s initially  - s/p IVP diltiazem  - cardiology consulted  - check TTE, TSH  - start lovenox, plan to switch to DOAC closer to discharge  - PO metoprolol started, more lenient with HR due to systolic CHF     OZZIE vs. CKD  - Cr 1.2 on admit, up to 1.4 with diuresis  - continue to trend  - Cr 1.6 this AM, hold diuretics, small IVF bolus per cardiology    Hx CVA  - continue plavix     PVD/HL  - statin     ACP  - CODE- DNR/full- confirmed with patient  - POA- wife, then daughter  - lives with spouse at independent living     FN:  - IVF: none  - Diet: cardiac     DVT Prophy:SCD, lovenox  Lines: PIV     Dispo: pending clinical course     Outpatient records or previous hospital records reviewed.      Further recommendations pending patient's clinical course.  [facility] [place] [facility] to continue to follow patient while in house     Patient and/or patient's family given opportunity to ask questions and note understanding and agreeing with therapeutic plan as outlined     Aaliyah Alva MD  [facility] [place] [facility]  Answering Service number: [phone]     Subjective:     No shortness of breath, HR elevated. Planned for cardiac cath today but Cr increased so cardiology wants IVF bolus and repeat Cr to decide if they will continue with cath today.     OBJECTIVE:    Blood pressure 104/67, pulse (!)  125, temperature 97.9 °F (36.6 °C), temperature source Oral, resp. rate 16, height 6' 1\" (1.854 m), weight 152 lb 8 oz (69.2 kg), SpO2 93%.    Temp:  [97.8 °F (36.6 °C)-98.3 °F (36.8 °C)] 97.9 °F (36.6 °C)  Pulse:  [104-146] 125  Resp:  [16] 16  BP: (100-110)/(60-73) 104/67  SpO2:  [93 %-96 %] 93 %      Intake/Output:    Intake/Output Summary (Last 24 hours) at 2/5/2024 1043  Last data filed at 2/5/2024 0900  Gross per 24 hour   Intake 780 ml   Output 1675 ml   Net -895 ml       Last 3 Weights   02/05/24 0500 152 lb 8 oz (69.2 kg)   02/04/24 0517 154 lb (69.9 kg)   02/03/24 0519 156 lb (70.8 kg)   02/02/24 0450 156 lb 9.6 oz (71 kg)   02/01/24 1341 161 lb 12.8 oz (73.4 kg)   02/01/24 1200 161 lb 12.8 oz (73.4 kg)   02/01/24 1059 160 lb (72.6 kg)   10/18/21 0813 168 lb (76.2 kg)   07/02/21 0827 167 lb 9.6 oz (76 kg)       Exam   GEN: elderly male in NAD  HEENT: EOMI,  Pulm: improved air movement, no crackles  CV: tachycardic, irregularly irregular  ABD: Soft, non-tender, non-distended, +BS  SKIN: warm, dry  EXT: no edema     Data Review:       Labs:     Recent Labs   Lab 02/01/24  1103 02/02/24  0540 02/04/24  0642 02/05/24  0616   RBC 3.28* 3.47* 3.59* 3.27*   HGB 10.5* 11.6* 12.0* 10.8*   HCT 32.5* 34.1* 35.1* 31.9*   MCV 99.1 98.3 97.8 97.6   MCH 32.0 33.4 33.4 33.0   MCHC 32.3 34.0 34.2 33.9   RDW 12.7 12.7 12.7 12.6   NEPRELIM 4.81  --   --   --    WBC 6.7 8.4 11.8* 9.0   .0 254.0 273.0 234.0         Recent Labs   Lab 02/03/24  0630 02/04/24  0642 02/05/24  0616   * 139* 140*   BUN 30* 33* 32*   CREATSERUM 1.44* 1.48* 1.61*   EGFRCR 46* 45* 41*   CA 9.3 9.4 8.9    143 142   K 4.4 4.2 3.8    108 109   CO2 27.0 28.0 27.0       Recent Labs   Lab 02/01/24  1103   ALT 20   AST 25   ALB 3.8         Imaging:  No results found.      Meds:      sodium chloride  500 mL Intravenous Once    enoxaparin  1 mg/kg Subcutaneous q12h    insulin aspart  1-5 Units Subcutaneous TID CC    atorvastatin  20 mg  Oral Daily    hydrALAZINE  25 mg Oral Q8H NEREIDA    metoprolol tartrate  25 mg Oral 2x Daily(Beta Blocker)       metoclopramide, ipratropium-albuterol, glucose **OR** glucose **OR** glucose-vitamin C **OR** dextrose **OR** glucose **OR** glucose **OR** glucose-vitamin C, acetaminophen, ondansetron

## 2024-02-06 LAB
ANION GAP SERPL CALC-SCNC: 7 MMOL/L (ref 0–18)
BUN BLD-MCNC: 27 MG/DL (ref 9–23)
BUN/CREAT SERPL: 22.5 (ref 10–20)
CALCIUM BLD-MCNC: 9 MG/DL (ref 8.7–10.4)
CHLORIDE SERPL-SCNC: 109 MMOL/L (ref 98–112)
CO2 SERPL-SCNC: 25 MMOL/L (ref 21–32)
CREAT BLD-MCNC: 1.2 MG/DL
DEPRECATED RDW RBC AUTO: 45.5 FL (ref 35.1–46.3)
EGFRCR SERPLBLD CKD-EPI 2021: 58 ML/MIN/1.73M2 (ref 60–?)
ERYTHROCYTE [DISTWIDTH] IN BLOOD BY AUTOMATED COUNT: 12.5 % (ref 11–15)
GLUCOSE BLD-MCNC: 135 MG/DL (ref 70–99)
GLUCOSE BLDC GLUCOMTR-MCNC: 138 MG/DL (ref 70–99)
GLUCOSE BLDC GLUCOMTR-MCNC: 159 MG/DL (ref 70–99)
GLUCOSE BLDC GLUCOMTR-MCNC: 166 MG/DL (ref 70–99)
GLUCOSE BLDC GLUCOMTR-MCNC: 179 MG/DL (ref 70–99)
GLUCOSE BLDC GLUCOMTR-MCNC: 205 MG/DL (ref 70–99)
HCT VFR BLD AUTO: 34.6 %
HGB BLD-MCNC: 11.6 G/DL
MAGNESIUM SERPL-MCNC: 2.1 MG/DL (ref 1.6–2.6)
MCH RBC QN AUTO: 33.1 PG (ref 26–34)
MCHC RBC AUTO-ENTMCNC: 33.5 G/DL (ref 31–37)
MCV RBC AUTO: 98.9 FL
OSMOLALITY SERPL CALC.SUM OF ELEC: 299 MOSM/KG (ref 275–295)
PLATELET # BLD AUTO: 235 10(3)UL (ref 150–450)
POTASSIUM SERPL-SCNC: 3.9 MMOL/L (ref 3.5–5.1)
RBC # BLD AUTO: 3.5 X10(6)UL
SODIUM SERPL-SCNC: 141 MMOL/L (ref 136–145)
WBC # BLD AUTO: 10.2 X10(3) UL (ref 4–11)

## 2024-02-06 PROCEDURE — 80061 LIPID PANEL: CPT | Performed by: INTERNAL MEDICINE

## 2024-02-06 PROCEDURE — 85027 COMPLETE CBC AUTOMATED: CPT | Performed by: HOSPITALIST

## 2024-02-06 PROCEDURE — 83735 ASSAY OF MAGNESIUM: CPT | Performed by: HOSPITALIST

## 2024-02-06 PROCEDURE — 80048 BASIC METABOLIC PNL TOTAL CA: CPT | Performed by: HOSPITALIST

## 2024-02-06 PROCEDURE — 82962 GLUCOSE BLOOD TEST: CPT

## 2024-02-06 RX ORDER — CYCLOBENZAPRINE HCL 5 MG
5 TABLET ORAL 2 TIMES DAILY PRN
Status: DISCONTINUED | OUTPATIENT
Start: 2024-02-06 | End: 2024-02-10

## 2024-02-06 NOTE — PLAN OF CARE
Patient and family updated on plan of care at bedside this AM. Patient went for heart cath this afternoon. Plans for cardiology to evaluate and discuss intervention options with family.  Problem: Patient Centered Care  Goal: Patient preferences are identified and integrated in the patient's plan of care  Description: Interventions:  - What would you like us to know as we care for you? I live at an independent living facility with my wife.   - Provide timely, complete, and accurate information to patient/family  - Incorporate patient and family knowledge, values, beliefs, and cultural backgrounds into the planning and delivery of care  - Encourage patient/family to participate in care and decision-making at the level they choose  - Honor patient and family perspectives and choices  Outcome: Progressing     Problem: Patient/Family Goals  Goal: Patient/Family Long Term Goal  Description: Patient's Long Term Goal: to go home    Interventions:  - rate control  -diuretics  - See additional Care Plan goals for specific interventions  Outcome: Progressing  Goal: Patient/Family Short Term Goal  Description: Patient's Short Term Goal: to feel better    Interventions:   - rate control  -diuretics  - See additional Care Plan goals for specific interventions  Outcome: Progressing     Problem: CARDIOVASCULAR - ADULT  Goal: Maintains optimal cardiac output and hemodynamic stability  Description: INTERVENTIONS:  - Monitor vital signs, rhythm, and trends  - Monitor for bleeding, hypotension and signs of decreased cardiac output  - Evaluate effectiveness of vasoactive medications to optimize hemodynamic stability  - Monitor arterial and/or venous puncture sites for bleeding and/or hematoma  - Assess quality of pulses, skin color and temperature  - Assess for signs of decreased coronary artery perfusion - ex. Angina  - Evaluate fluid balance, assess for edema, trend weights  Outcome: Progressing  Goal: Absence of cardiac arrhythmias  or at baseline  Description: INTERVENTIONS:  - Continuous cardiac monitoring, monitor vital signs, obtain 12 lead EKG if indicated  - Evaluate effectiveness of antiarrhythmic and heart rate control medications as ordered  - Initiate emergency measures for life threatening arrhythmias  - Monitor electrolytes and administer replacement therapy as ordered  Outcome: Progressing     Problem: GENITOURINARY - ADULT  Goal: Absence of urinary retention  Description: INTERVENTIONS:  - Assess patient’s ability to void and empty bladder  - Monitor intake/output and perform bladder scan as needed  - Follow urinary retention protocol/standard of care  - Consider collaborating with pharmacy to review patient's medication profile  - Implement strategies to promote bladder emptying  Outcome: Progressing     Problem: METABOLIC/FLUID AND ELECTROLYTES - ADULT  Goal: Glucose maintained within prescribed range  Description: INTERVENTIONS:  - Monitor Blood Glucose as ordered  - Assess for signs and symptoms of hyperglycemia and hypoglycemia  - Administer ordered medications to maintain glucose within target range  - Assess barriers to adequate nutritional intake and initiate nutrition consult as needed  - Instruct patient on self management of diabetes  Outcome: Progressing  Goal: Electrolytes maintained within normal limits  Description: INTERVENTIONS:  - Monitor labs and rhythm and assess patient for signs and symptoms of electrolyte imbalances  - Administer electrolyte replacement as ordered  - Monitor response to electrolyte replacements, including rhythm and repeat lab results as appropriate  - Fluid restriction as ordered  - Instruct patient on fluid and nutrition restrictions as appropriate  Outcome: Progressing  Goal: Hemodynamic stability and optimal renal function maintained  Description: INTERVENTIONS:  - Monitor labs and assess for signs and symptoms of volume excess or deficit  - Monitor intake, output and patient weight  -  Monitor urine specific gravity, serum osmolarity and serum sodium as indicated or ordered  - Monitor response to interventions for patient's volume status, including labs, urine output, blood pressure (other measures as available)  - Encourage oral intake as appropriate  - Instruct patient on fluid and nutrition restrictions as appropriate  Outcome: Progressing     Problem: HEMATOLOGIC - ADULT  Goal: Maintains hematologic stability  Description: INTERVENTIONS  - Assess for signs and symptoms of bleeding or hemorrhage  - Monitor labs and vital signs for trends  - Administer supportive blood products/factors, fluids and medications as ordered and appropriate  - Administer supportive blood products/factors as ordered and appropriate  Outcome: Progressing  Goal: Free from bleeding injury  Description: (Example usage: patient with low platelets)  INTERVENTIONS:  - Avoid intramuscular injections, enemas and rectal medication administration  - Ensure safe mobilization of patient  - Hold pressure on venipuncture sites to achieve adequate hemostasis  - Assess for signs and symptoms of internal bleeding  - Monitor lab trends  - Patient is to report abnormal signs of bleeding to staff  - Avoid use of toothpicks and dental floss  - Use electric shaver for shaving  - Use soft bristle tooth brush  - Limit straining and forceful nose blowing  Outcome: Progressing

## 2024-02-06 NOTE — PROGRESS NOTES
[facility]    Cardiology Progress Note    Edward Heard Patient Status:  Inpatient    1934 MRN [id number]   Location [facility] 3W/SW Attending Aaliyah Alva MD   Hosp Day # 5 PCP Donavan Noonan MD     Briefly, patient is a 88 yo man with HDL, hx TIA, PVD presenting with CHF and in afib wit RVR. Initially received IV lasix and dilt IV for HR control. He had TTE today, which shows severely depressed LVEF to 15-20% with global hypokinesis.     Impression:  Atrial fibrillation with RVR  - Euvolemic/mildly hypovolemic at this time given rise in Cr from the weekend. His rate ranges from 100-130 bpm but occasionally rises to 150 bpm.   Congestive heart failure 2/2 cardiomyopathy (LVEF 10-15), likely ischemic  - Euvolemic/mildly hypovolemic at this time given rise in Cr from the weekend.  - Cath showing severe proximal LM disease, severe disease of small circumflex, mild mid RCA disease.  - RHC showing patient is euvolemic with PCWP 12 mmHg, LVEDP 6-10 mmHg, CI 2.4 L/min/m2   HDL  TIA  PVD  CAD with severe, calcified proximal left main disease     Recommendations:  IV diuresis: hold today pending renal function. Patient is euvolemic on my exam.  CT surgery evaluation today  Can discontinue clopidgrel to minimize bleeding risk given his age.  Atorvastatin 40mg daily  Metoprolol tartrate 50 mg BID, can uptitrate for HR > 140 bpm consistently. Elevated HR to some degree is compensatory for reduced LVEF.  Hydralazine 25mg TID to control afterload. Hold for SBP < 100 mmHg.  Will start GDMT as able pending improvement in renal function and BP.  Will need to have discussion with patient and family about next steps, ie. Surgery vs high risk impella-assisted PCI vs medical therapy.       Subjective:     PND/orthopnea last night. No chest pain. Cr improved to 1.2 this am. HR has been in the 110-140 bpm range.       Patient Active Problem List   Diagnosis    CHRONIC AIRWAY OBSTRUCTION    Carotid  disease, bilateral (HCC)    Uncontrolled type 2 diabetes mellitus with hyperglycemia, without long-term current use of insulin (HCC)    Bilateral pseudophakia    Arcus senilis of cornea, bilateral    Atrial fibrillation, new onset (HCC)    Congestive heart failure, unspecified HF chronicity, unspecified heart failure type (HCC)       Objective:   Temp: 98 °F (36.7 °C)  Pulse: 142  Resp: 20  BP: 107/62    Intake/Output:     Intake/Output Summary (Last 24 hours) at 2/6/2024 1056  Last data filed at 2/6/2024 0904  Gross per 24 hour   Intake 1020 ml   Output 830 ml   Net 190 ml       Last 3 Weights   02/06/24 0400 150 lb 1.6 oz (68.1 kg)   02/05/24 0500 152 lb 8 oz (69.2 kg)   02/04/24 0517 154 lb (69.9 kg)   02/03/24 0519 156 lb (70.8 kg)   02/02/24 0450 156 lb 9.6 oz (71 kg)   02/01/24 1341 161 lb 12.8 oz (73.4 kg)   02/01/24 1200 161 lb 12.8 oz (73.4 kg)   02/01/24 1059 160 lb (72.6 kg)   10/18/21 0813 168 lb (76.2 kg)   07/02/21 0827 167 lb 9.6 oz (76 kg)       Tele: AF    Physical Exam:    General: awake, alert, oriented x 3, no acute distress  HEENT: at/nc, perrl, eomi  Neck: supple  Cardiac: irregularly irregular, S1, S2 normal, no murmur, rub or gallop.  Lungs: No rhonchi or dullness.  Normal excursions and effort.   Abdomen: Soft, non-tender, non-distended, normal bowel sounds   Extremities: Without clubbing, cyanosis. . trace pitting edema to ankles bilaterally.  Neurologic: Alert and oriented, normal affect.  Psych: normal mood and affect  Skin: Warm and dry.   Laboratory/Data:    Labs:         Recent Labs   Lab 02/01/24  1103 02/02/24  0540 02/04/24  0642 02/05/24  0616 02/06/24  0723   WBC 6.7 8.4 11.8* 9.0 10.2   HGB 10.5* 11.6* 12.0* 10.8* 11.6*   MCV 99.1 98.3 97.8 97.6 98.9   .0 254.0 273.0 234.0 235.0       Recent Labs   Lab 02/03/24  0630 02/04/24  0642 02/05/24  0616 02/05/24  1255 02/06/24  0723    143 142 141 141   K 4.4 4.2 3.8 5.0 3.9    108 109 109 109   CO2 27.0 28.0 27.0  26.0 25.0   BUN 30* 33* 32* 31* 27*   CREATSERUM 1.44* 1.48* 1.61* 1.40* 1.20   CA 9.3 9.4 8.9 9.2 9.0   MG 1.9 2.0 2.0  --  2.1   * 139* 140* 130* 135*       Recent Labs   Lab 02/01/24  1103   ALT 20   AST 25   ALB 3.8       No results for input(s): \"TROP\" in the last 168 hours.      ECHO:  1. Study data: Atrial fibrillation   2. Left ventricle: The cavity size was at the upper limits of normal. Wall      thickness was normal. Systolic function was markedly reduced. The      estimated ejection fraction was 15-20%, by visual assessment. Unable to      assess LV diastolic function due to heart rhythm.   3. Right ventricle: Systolic function was mildly reduced.   4. Left atrium: The left atrial volume was mildly to moderately increased.   5. Right atrium: The estimated central venous pressure is 8mm Hg.   6. Mitral valve: There was mild to moderate regurgitation.   Impressions:  No previous study was available for comparison.     Coronary angiogram 2/5/24  Left main: Severe calcified ostial-proximal stenosis of the left main coronary artery.  LAD: Large vessel that wraps the apex. Luminal irregularities.  Ramus: Large vessel with luminal irregularities.  Left circumflex: Large vessel giving rise to a large OM branch. Severe tubular 80% lesion of the ostial to proximal OM.  RCA: Anterior takeoff. Large caliber vessel that gives rise to the PDA. Luminal irregularities.    Right heart cath 2/5/24  RA 4/4/3  RV 25/1/4  PCWP 16/13/12  PA 23/13/18  Ao 97/36/58  LVEDP 6 mmHg     PA sat 52%     Laurie Cardiac Output 4.7 L/min  Laurie Cardiac Index 2.4 L/min/m2    Allergies:   No Known Allergies    Medications:    Mau Grigsby MD  Interventional Cardiology

## 2024-02-06 NOTE — CONSULTS
[facility]  [facility]  Report of Consultation    Edward Heard Patient Status:  Inpatient    1934 MRN [id number]   Location [facility] 3W/SW Attending Aaliyah Alva MD   Hosp Day # 5 PCP Donavan Noonan MD     Date of Admission:  2024  Date of Consult:  2024    Reason for Consultation:  Ischemic cardiomyopathy, multivessel coronary disease, atrial fibrillation, left main stenosis critical      History of Present Illness:  Edward Heard is a a(n) 89 year old male.  No previous cardiac history.  For approximately 2 weeks, has been feeling increasingly short of breath progressing to dyspnea on exertion and edema in his legs.  Presented to the emergency room and found to be in heart failure and admitted for the workup and evaluation.  Over the last 5 days, he has been diuresed and treated with some anticoagulation.  Workup is as follows: Echocardiogram x-rays and Jek fraction of 15 to 20% with global left ventricular hypokinesia with some mild to moderate mitral rotation.  Presented atrial fibrillation with rates difficult to control, between 100 140 but the pressure has been adequately controlled.  He had angiography yesterday demonstrating critical left main stenosis 90 to 95%.  We are asked evaluate possible surgical coronary vascularization options.    Patient states he normally is very active, few months ago was able to drive, can walk a few blocks but does a short breath quickly, states he was doing push-ups and sit ups at some point within the last year.  He lives at an assisted living facility with his wife.  He has a 60-pack-year smoking history, quit about 30 years ago but smoked for 30 years about 2 packs a day.  Retired .  He is DNR.      His wife daughter and granddaughter at the bedside.  She states he does look increasingly frail and deconditioned now than when he was first admitted.    Has a history of TIA on 10 years ago with no  residual deficit.  States he has peripheral vascular disease in his left leg, states he had stents put in but now no flow in his leg.  Denies any claudication type symptoms.    History:  Past Medical History:   Diagnosis Date    Cataract     Dry eye     Former smoker     Hyperlipidemia     Impaired fasting glucose     Left carotid bruit     Orbital mass     left    Pseudophakia     PVD (peripheral vascular disease) (HCC)     Stroke (cerebrum) ([facility])      Past Surgical History:   Procedure Laterality Date    CAROTID ENDARTERECTOMY Left 1995    CATARACT Bilateral     YAG CAPSULOTOMY - OD - RIGHT EYE Right 06/21/2005     Family History   Problem Relation Age of Onset    Cataracts Mother       reports that he quit smoking about 45 years ago. His smoking use included cigarettes. He has a 30 pack-year smoking history. He has never used smokeless tobacco. He reports that he does not drink alcohol and does not use drugs.    Allergies:  No Known Allergies    Medications:    Current Facility-Administered Medications:     cyclobenzaprine (Flexeril) tab 5 mg, 5 mg, Oral, BID PRN    metoprolol tartrate (Lopressor) tab 50 mg, 50 mg, Oral, 2x Daily(Beta Blocker)    metoclopramide (Reglan) 5 mg/mL injection 5 mg, 5 mg, Intravenous, Q8H PRN    ipratropium-albuterol (Duoneb) 0.5-2.5 (3) MG/3ML inhalation solution 3 mL, 3 mL, Nebulization, Q6H PRN    glucose (Dex4) 15 GM/59ML oral liquid 15 g, 15 g, Oral, Q15 Min PRN **OR** glucose (Glutose) 40% oral gel 15 g, 15 g, Oral, Q15 Min PRN **OR** glucose-vitamin C (Dex-4) chewable tab 4 tablet, 4 tablet, Oral, Q15 Min PRN **OR** dextrose 50% injection 50 mL, 50 mL, Intravenous, Q15 Min PRN **OR** glucose (Dex4) 15 GM/59ML oral liquid 30 g, 30 g, Oral, Q15 Min PRN **OR** glucose (Glutose) 40% oral gel 30 g, 30 g, Oral, Q15 Min PRN **OR** glucose-vitamin C (Dex-4) chewable tab 8 tablet, 8 tablet, Oral, Q15 Min PRN    enoxaparin (Lovenox) 80 MG/0.8ML SUBQ injection 70 mg, 1 mg/kg, Subcutaneous,  q12h    acetaminophen (Tylenol Extra Strength) tab 500 mg, 500 mg, Oral, Q4H PRN    ondansetron (Zofran) 4 MG/2ML injection 4 mg, 4 mg, Intravenous, Q6H PRN    insulin aspart (NovoLOG) 100 Units/mL FlexPen 1-5 Units, 1-5 Units, Subcutaneous, TID CC    atorvastatin (Lipitor) tab 20 mg, 20 mg, Oral, Daily    hydrALAZINE (Apresoline) tab 25 mg, 25 mg, Oral, Q8H NEREIDA    Review of Systems:  Pertinent items are noted in HPI.    Physical Exam:  Blood pressure 107/62, pulse (!) 142, temperature 98 °F (36.7 °C), temperature source Oral, resp. rate 20, height 6' 1\" (1.854 m), weight 150 lb 1.6 oz (68.1 kg), SpO2 93%.    GENERAL: No acute distress, appears frail, deconditioned  VITAL SIGNS: See above.  HEENT: Trachea midline.  No jugular venous distention.  No carotid bruit.  CHEST: Chest wall is nontender.  HEART:  irregular rate and rhythm, 2/6 SHERON LLSB  LUNGS: Wheeze in bases  ABDOMEN: Soft, nontender nondistended.  VASCULAR: Palpable radial artery pulses 2+ bilateral, Palp right DP pulse, no pulses palpable in left foot.    SKIN: No rash, no excessive bruising, petechiae, or purpura.  NEUROLOGIC: Cranial nerves II-XII intact without motor/sensory deficit.      Laboratory Data:  Recent Labs   Lab 02/01/24  1103 02/02/24  0540 02/04/24  0642 02/05/24  0616 02/06/24  0723   RBC 3.28*   < > 3.59* 3.27* 3.50*   HGB 10.5*   < > 12.0* 10.8* 11.6*   HCT 32.5*   < > 35.1* 31.9* 34.6*   MCV 99.1   < > 97.8 97.6 98.9   MCH 32.0   < > 33.4 33.0 33.1   MCHC 32.3   < > 34.2 33.9 33.5   RDW 12.7   < > 12.7 12.6 12.5   NEPRELIM 4.81  --   --   --   --    WBC 6.7   < > 11.8* 9.0 10.2   .0   < > 273.0 234.0 235.0    < > = values in this interval not displayed.       Recent Labs   Lab 02/05/24  0616 02/05/24  1255 02/06/24  0723   * 130* 135*   BUN 32* 31* 27*   CREATSERUM 1.61* 1.40* 1.20   EGFRCR 41* 48* 58*   CA 8.9 9.2 9.0    141 141   K 3.8 5.0 3.9    109 109   CO2 27.0 26.0 25.0     ECHO:  1. Study data: Atrial  fibrillation   2. Left ventricle: The cavity size was at the upper limits of normal. Wall      thickness was normal. Systolic function was markedly reduced. The      estimated ejection fraction was 15-20%, by visual assessment. Unable to      assess LV diastolic function due to heart rhythm.   3. Right ventricle: Systolic function was mildly reduced.   4. Left atrium: The left atrial volume was mildly to moderately increased.   5. Right atrium: The estimated central venous pressure is 8mm Hg.   6. Mitral valve: There was mild to moderate regurgitation.   Impressions:  No previous study was available for comparison.      Coronary angiogram 2/5/24  Left main: Severe calcified ostial-proximal stenosis of the left main coronary artery.  LAD: Large vessel that wraps the apex. Luminal irregularities.  Ramus: Large vessel with luminal irregularities.  Left circumflex: Large vessel giving rise to a large OM branch. Severe tubular 80% lesion of the ostial to proximal OM.  RCA: Anterior takeoff. Large caliber vessel that gives rise to the PDA. Luminal irregularities.     Right heart cath 2/5/24  RA 4/4/3  RV 25/1/4  PCWP 16/13/12  PA 23/13/18  Ao 97/36/58  LVEDP 6 mmHg     PA sat 52%     Laurie Cardiac Output 4.7 L/min  Laurie Cardiac Index 2.4 L/min/m2       Impression and Plan:  Patient Active Problem List   Diagnosis    CHRONIC AIRWAY OBSTRUCTION    Carotid disease, bilateral (HCC)    Uncontrolled type 2 diabetes mellitus with hyperglycemia, without long-term current use of insulin (HCC)    Bilateral pseudophakia    Arcus senilis of cornea, bilateral    Atrial fibrillation, new onset (HCC)    Congestive heart failure, unspecified HF chronicity, unspecified heart failure type ([facility])       89-year-old male with acute congestive heart failure, atrial fibrillation with a ventricular response, ischemic cardiomyopathy ejection fraction 15% with multivessel coronary disease including critical left main stenosis.    STS risk score was  calculated for urgent CABG.  Patient will need a preoperative intranasal implant given his critical left and stenosis in the setting of ischemic cardiomyopathy.  In light of this, his risk score for mortality is over 30%.    I had a very long discussion the patient, wife and daughter.  I explained open heart surgery, the conduct of with cardiac arrest and use the coronary bypass machine.  We discussed the operation itself as well as recovery.    Overall, I feel the patient is prohibitive risk candidate for open heart surgery.  His STS rescore is over 30% mortality.  I believe there is an extremely high risk of cardiogenic shock with prolonged balloon pump, respiratory failure with prolonged ventilation necessitating potentially a trach or feeding tube, kidney insufficiency potential requiring dialysis as well as overall failure to thrive given his BMI of 19.8 and his relatively frail and deconditioned appearance.    Discussed the case with interventional cardiology, they will evaluate for advanced percutaneous options in regards to treating his coronary disease.    I spent over 60 minutes in consultation with patient and family and I answered many of their excellent questions their satisfaction.    Bhargav Nelson MD  Cardiothoracic Surgery  [facility]     Time spent on counseling/coordination of care:  [id number]- 80 min    Total time spent with patient:  1 Hour    Bhargav Nelson MD  2/6/2024  2:23 PM

## 2024-02-06 NOTE — PLAN OF CARE
Problem: CARDIOVASCULAR - ADULT  Goal: Maintains optimal cardiac output and hemodynamic stability  Description: INTERVENTIONS:  - Monitor vital signs, rhythm, and trends  - Monitor for bleeding, hypotension and signs of decreased cardiac output  - Evaluate effectiveness of vasoactive medications to optimize hemodynamic stability  - Monitor arterial and/or venous puncture sites for bleeding and/or hematoma  - Assess quality of pulses, skin color and temperature  - Assess for signs of decreased coronary artery perfusion - ex. Angina  - Evaluate fluid balance, assess for edema, trend weights  Outcome: Not Progressing  Goal: Absence of cardiac arrhythmias or at baseline  Description: INTERVENTIONS:  - Continuous cardiac monitoring, monitor vital signs, obtain 12 lead EKG if indicated  - Evaluate effectiveness of antiarrhythmic and heart rate control medications as ordered  - Initiate emergency measures for life threatening arrhythmias  - Monitor electrolytes and administer replacement therapy as ordered  Outcome: Not Progressing     Problem: GENITOURINARY - ADULT  Goal: Absence of urinary retention  Description: INTERVENTIONS:  - Assess patient’s ability to void and empty bladder  - Monitor intake/output and perform bladder scan as needed  - Follow urinary retention protocol/standard of care  - Consider collaborating with pharmacy to review patient's medication profile  - Implement strategies to promote bladder emptying  Outcome: Progressing     Problem: METABOLIC/FLUID AND ELECTROLYTES - ADULT  Goal: Glucose maintained within prescribed range  Description: INTERVENTIONS:  - Monitor Blood Glucose as ordered  - Assess for signs and symptoms of hyperglycemia and hypoglycemia  - Administer ordered medications to maintain glucose within target range  - Assess barriers to adequate nutritional intake and initiate nutrition consult as needed  - Instruct patient on self management of diabetes  Outcome: Progressing  Goal:  Electrolytes maintained within normal limits  Description: INTERVENTIONS:  - Monitor labs and rhythm and assess patient for signs and symptoms of electrolyte imbalances  - Administer electrolyte replacement as ordered  - Monitor response to electrolyte replacements, including rhythm and repeat lab results as appropriate  - Fluid restriction as ordered  - Instruct patient on fluid and nutrition restrictions as appropriate  Outcome: Progressing     Problem: HEMATOLOGIC - ADULT  Goal: Maintains hematologic stability  Description: INTERVENTIONS  - Assess for signs and symptoms of bleeding or hemorrhage  - Monitor labs and vital signs for trends  - Administer supportive blood products/factors, fluids and medications as ordered and appropriate  - Administer supportive blood products/factors as ordered and appropriate  Outcome: Progressing  Goal: Free from bleeding injury  Description: (Example usage: patient with low platelets)  INTERVENTIONS:  - Avoid intramuscular injections, enemas and rectal medication administration  - Ensure safe mobilization of patient  - Hold pressure on venipuncture sites to achieve adequate hemostasis  - Assess for signs and symptoms of internal bleeding  - Monitor lab trends  - Patient is to report abnormal signs of bleeding to staff  - Avoid use of toothpicks and dental floss  - Use electric shaver for shaving  - Use soft bristle tooth brush  - Limit straining and forceful nose blowing  Outcome: Progressing     Received awake,oriented. -130's,with SBP in the 80's,asymptomatic. notified with orders made.Tylenol given for headache. Slept fairly during the night. Plans for possible staged intervention-MD will speak with patient/family in am.Right wrist cath site-no bleeding,no swelling noted. Will continue to monitor patient.

## 2024-02-06 NOTE — PROGRESS NOTES
RN approving of pt participation in therapy. Contact coordinated w/ PT. MD currently at bedside. Treatment deferred

## 2024-02-06 NOTE — PROGRESS NOTES
[facility] Hospitalist Progress Note     CC: Hospital Follow up    PCP: Donavan Noonan MD       Assessment/Plan:     Principal Problem:    Atrial fibrillation, new onset (HCC)  Active Problems:    Congestive heart failure, unspecified HF chronicity, unspecified heart failure type (HCC)    Mr. Heard is an 88 yo M with PMH of CVA, PVD, HL who presented from [facility] with new onset Afib and acute systolic CHF. Plan for cardiac cath on 2/5/24.      Acute systolic CHF- improved  Fluid overload  - s/p IV lasix, held but now restarted  - TTE with EF 15-20%  - weights, strict I/O  - s/p cardiac cath 2/5/24 with CAD    CAD  - cardiac cath 2/5/24 with severe calcified L main disease, Lcx with 80% lesion of prox OM  - d/w cardiology, recommending CV surgery eval  - pending CV surgery recs, ?surgery vs. Complicated PCI    New onset Afib with RVR  - HR 130s initially  - s/p IVP diltiazem  - cardiology consulted  - check TTE, TSH  - start lovenox, plan to switch to DOAC closer to discharge  - PO metoprolol started, more lenient with HR due to systolic CHF     OZZIE vs. CKD  - Cr 1.2 on admit, up to 1.4 with diuresis  - continue to trend  - Cr 1.6 this AM, hold diuretics, small IVF bolus per cardiology    Hx CVA  - continue plavix     PVD/HL  - statin     ACP  - CODE- DNR/full- confirmed with patient  - POA- wife, then daughter  - lives with spouse at independent living     FN:  - IVF: none  - Diet: cardiac     DVT Prophy:SCD, lovenox  Lines: PIV     Dispo: pending clinical course     Outpatient records or previous hospital records reviewed.      Further recommendations pending patient's clinical course.  [facility] hospitalist to continue to follow patient while in house     Patient and/or patient's family given opportunity to ask questions and note understanding and agreeing with therapeutic plan as outlined     Aaliyah Alva MD  [facility] Hospitalist  Answering Service number: [phone]     Subjective:     S/p cardiac cath yesterday. Cardiology  recommending CV surgery eval. No chest pain, shortness of breath or leg swelling.     OBJECTIVE:    Blood pressure 107/62, pulse (!) 142, temperature 98 °F (36.7 °C), temperature source Oral, resp. rate 20, height 6' 1\" (1.854 m), weight 150 lb 1.6 oz (68.1 kg), SpO2 93%.    Temp:  [97.5 °F (36.4 °C)-98 °F (36.7 °C)] 98 °F (36.7 °C)  Pulse:  [105-145] 142  Resp:  [16-20] 20  BP: ()/() 107/62  SpO2:  [93 %-98 %] 93 %      Intake/Output:    Intake/Output Summary (Last 24 hours) at 2/6/2024 1146  Last data filed at 2/6/2024 1057  Gross per 24 hour   Intake 1020 ml   Output 1180 ml   Net -160 ml       Last 3 Weights   02/06/24 0400 150 lb 1.6 oz (68.1 kg)   02/05/24 0500 152 lb 8 oz (69.2 kg)   02/04/24 0517 154 lb (69.9 kg)   02/03/24 0519 156 lb (70.8 kg)   02/02/24 0450 156 lb 9.6 oz (71 kg)   02/01/24 1341 161 lb 12.8 oz (73.4 kg)   02/01/24 1200 161 lb 12.8 oz (73.4 kg)   02/01/24 1059 160 lb (72.6 kg)   10/18/21 0813 168 lb (76.2 kg)   07/02/21 0827 167 lb 9.6 oz (76 kg)       Exam   GEN: elderly male in NAD  HEENT: EOMI,  Pulm: improved air movement, no crackles  CV: tachycardic, irregularly irregular  ABD: Soft, non-tender, non-distended, +BS  SKIN: warm, dry  EXT: no edema     Data Review:       Labs:     Recent Labs   Lab 02/01/24  1103 02/02/24  0540 02/04/24  0642 02/05/24  0616 02/06/24  0723   RBC 3.28*   < > 3.59* 3.27* 3.50*   HGB 10.5*   < > 12.0* 10.8* 11.6*   HCT 32.5*   < > 35.1* 31.9* 34.6*   MCV 99.1   < > 97.8 97.6 98.9   MCH 32.0   < > 33.4 33.0 33.1   MCHC 32.3   < > 34.2 33.9 33.5   RDW 12.7   < > 12.7 12.6 12.5   NEPRELIM 4.81  --   --   --   --    WBC 6.7   < > 11.8* 9.0 10.2   .0   < > 273.0 234.0 235.0    < > = values in this interval not displayed.         Recent Labs   Lab 02/05/24  0616 02/05/24  1255 02/06/24  0723   * 130* 135*   BUN 32* 31* 27*   CREATSERUM 1.61* 1.40* 1.20   EGFRCR 41* 48* 58*   CA 8.9 9.2 9.0    141 141   K 3.8 5.0 3.9    109  109   CO2 27.0 26.0 25.0       Recent Labs   Lab 02/01/24  1103   ALT 20   AST 25   ALB 3.8         Imaging:  No results found.      Meds:      metoprolol tartrate  50 mg Oral 2x Daily(Beta Blocker)    enoxaparin  1 mg/kg Subcutaneous q12h    insulin aspart  1-5 Units Subcutaneous TID CC    atorvastatin  20 mg Oral Daily    hydrALAZINE  25 mg Oral Q8H NEREIDA       metoclopramide, ipratropium-albuterol, glucose **OR** glucose **OR** glucose-vitamin C **OR** dextrose **OR** glucose **OR** glucose **OR** glucose-vitamin C, acetaminophen, ondansetron

## 2024-02-06 NOTE — PROGRESS NOTES
Attempted follow up PT treatment. MD at bedside. Will re-attempt as schedule permits.     Thank you,  Melisa Roberson, PT, DPT

## 2024-02-07 ENCOUNTER — APPOINTMENT (OUTPATIENT)
Dept: CT IMAGING | Facility: HOSPITAL | Age: 89
End: 2024-02-07
Attending: INTERNAL MEDICINE
Payer: MEDICARE

## 2024-02-07 LAB
ANION GAP SERPL CALC-SCNC: 6 MMOL/L (ref 0–18)
BUN BLD-MCNC: 22 MG/DL (ref 9–23)
BUN/CREAT SERPL: 17.5 (ref 10–20)
CALCIUM BLD-MCNC: 9.5 MG/DL (ref 8.7–10.4)
CHLORIDE SERPL-SCNC: 109 MMOL/L (ref 98–112)
CHOLEST SERPL-MCNC: 88 MG/DL (ref ?–200)
CO2 SERPL-SCNC: 26 MMOL/L (ref 21–32)
CREAT BLD-MCNC: 1.26 MG/DL
EGFRCR SERPLBLD CKD-EPI 2021: 55 ML/MIN/1.73M2 (ref 60–?)
GLUCOSE BLD-MCNC: 162 MG/DL (ref 70–99)
GLUCOSE BLDC GLUCOMTR-MCNC: 135 MG/DL (ref 70–99)
GLUCOSE BLDC GLUCOMTR-MCNC: 149 MG/DL (ref 70–99)
GLUCOSE BLDC GLUCOMTR-MCNC: 158 MG/DL (ref 70–99)
GLUCOSE BLDC GLUCOMTR-MCNC: 161 MG/DL (ref 70–99)
HDLC SERPL-MCNC: 32 MG/DL (ref 40–59)
LDLC SERPL CALC-MCNC: 42 MG/DL (ref ?–100)
NONHDLC SERPL-MCNC: 56 MG/DL (ref ?–130)
OSMOLALITY SERPL CALC.SUM OF ELEC: 299 MOSM/KG (ref 275–295)
POTASSIUM SERPL-SCNC: 5.5 MMOL/L (ref 3.5–5.1)
SODIUM SERPL-SCNC: 141 MMOL/L (ref 136–145)
TRIGL SERPL-MCNC: 60 MG/DL (ref 30–149)
VLDLC SERPL CALC-MCNC: 8 MG/DL (ref 0–30)

## 2024-02-07 PROCEDURE — 82962 GLUCOSE BLOOD TEST: CPT

## 2024-02-07 PROCEDURE — 97116 GAIT TRAINING THERAPY: CPT

## 2024-02-07 PROCEDURE — 97530 THERAPEUTIC ACTIVITIES: CPT

## 2024-02-07 PROCEDURE — 80048 BASIC METABOLIC PNL TOTAL CA: CPT | Performed by: INTERNAL MEDICINE

## 2024-02-07 PROCEDURE — 75635 CT ANGIO ABDOMINAL ARTERIES: CPT | Performed by: INTERNAL MEDICINE

## 2024-02-07 RX ORDER — FUROSEMIDE 20 MG/1
20 TABLET ORAL DAILY
Status: DISCONTINUED | OUTPATIENT
Start: 2024-02-07 | End: 2024-02-10

## 2024-02-07 RX ORDER — LISINOPRIL 2.5 MG/1
2.5 TABLET ORAL DAILY
Status: DISCONTINUED | OUTPATIENT
Start: 2024-02-07 | End: 2024-02-10

## 2024-02-07 NOTE — OCCUPATIONAL THERAPY NOTE
OCCUPATIONAL THERAPY TREATMENT NOTE - INPATIENT        Room Number: 330/330-A     Presenting Problem: generalized weakness, a fib with RVR, CHF    Problem List  Principal Problem:    Atrial fibrillation, new onset (HCC)  Active Problems:    Congestive heart failure, unspecified HF chronicity, unspecified heart failure type (HCC)      OCCUPATIONAL THERAPY ASSESSMENT   Per chart review, patient is not a candidate for cardiothoracic surgery. Plan for cardioversion tomorrow 2/8/24 and PCI to L main as outpatient. Patient demonstrates fair progress this session, goals remain in progress. Limited by elevated HR; up to 162 bpm with ax during today's therapy session. Dr. Grigsby (cardiology MD) notified via [employer] secure chat; stated patient HR goal is <150 bpm with activity.    Patient continues to function near baseline with  ADLs and fx mobility/transfers .   Contributing factors to remaining limitations include decreased functional strength, decreased endurance, and medical status.  Next session anticipate patient to progress  energy conservation strategies to facilitate ADLs and ensure safety throughout .  Occupational Therapy will continue to follow patient for duration of hospitalization.    Patient continues to benefit from continued skilled OT services: to promote return to prior level of function and safety with continuous assistance and gradual rehabilitative therapy  and at discharge to promote functional independence and safety with additional support and return home with home health OT.     PLAN  OT Treatment Plan: Balance activities;Energy conservation/work simplification techniques;ADL training;Functional transfer training;Endurance training;Patient/Family education;Patient/Family training;Equipment eval/education;Compensatory technique education  OT Device Recommendations: None    SUBJECTIVE  \"I've been walking to the bathroom with the nursing staff.\"    OBJECTIVE  Precautions: Cardiac;Hard of  hearing;Bed/chair alarm    WEIGHT BEARING RESTRICTION  None    PAIN ASSESSMENT  Rating: -- (not rated)  Location: headache; neck pain  Management Techniques: Repositioning; Relaxation (RN aware)    ACTIVITY TOLERANCE  Pulse: (!) 162 (up to 162 bpm with ax; fluctuating between 140s-150s at rest; medical team notified of findings)  Heart Rate Source: Monitor       O2 SATURATIONS  Oxygen Therapy  SPO2% Ambulation on Room Air: 95    ACTIVITIES OF DAILY LIVING ASSESSMENT  AM-PAC ‘6-Clicks’ Inpatient Daily Activity Short Form  How much help from another person does the patient currently need…  -   Putting on and taking off regular lower body clothing?: A Little  -   Bathing (including washing, rinsing, drying)?: A Little  -   Toileting, which includes using toilet, bedpan or urinal? : A Little  -   Putting on and taking off regular upper body clothing?: None  -   Taking care of personal grooming such as brushing teeth?: A Little  -   Eating meals?: None    AM-PAC Score:  Score: 20  Approx Degree of Impairment: 38.32%  Standardized Score (AM-PAC Scale): 42.03  CMS Modifier (G-Code): CJ    BED MOBILITY  Supine <> Sit: SUP    FUNCTIONAL TRANSFER ASSESSMENT  Sit <> Stand from EOB: CGA    FUNCTIONAL MOBILITY  CGA for hallway fx mobility using RW    FUNCTIONAL ADL ASSESSMENT  LB Dressing: min assist  Comments:  Patient benefited from education on activity pacing for energy conservation. Reported he has a walk-in shower with shower chair and comfort height toilets at [facility].     EDUCATION PROVIDED  Patient: Plan of Care; Role of Occupational Therapy; Discharge Recommendations; Functional Transfer Techniques; Fall Prevention; Posture/Positioning; Energy Conservation; Compensatory ADL Techniques; Proper Body Mechanics  Patient's Response to Education: Verbalized Understanding; Returned Demonstration    The patient's Approx Degree of Impairment: 38.32% has been calculated based on documentation in the [facility] '6 clicks' Inpatient Daily  Activity Short Form.  Research supports that patients with this level of impairment may benefit from  OT.  Final disposition will be made by interdisciplinary medical team.    Patient End of Session: In bed;Needs met;Call light within reach;RN aware of session/findings;All patient questions and concerns addressed;Alarm set    OT Goals:  Patient's self stated goal is: return to PLOF     Patient will complete functional transfer with SUP  Comment: ongoing    Patient will complete toileting with SUP  Comment: ongoing    Patient will complete LB dressing with SUP  Comment: ongoing    Patient will complete item retrieval with SUP  Comment: ongoing          Goals  on: 24  Frequency: 3-5x/week    OT Session Time  Therapeutic Activity: 16 minutes    CARLEE Celis/L  [facility]  #[id number]

## 2024-02-07 NOTE — PROGRESS NOTES
[facility] Hospitalist Progress Note     CC: Hospital Follow up    PCP: Donavan Noonan MD       Assessment/Plan:     Principal Problem:    Atrial fibrillation, new onset (HCC)  Active Problems:    Congestive heart failure, unspecified HF chronicity, unspecified heart failure type (HCC)        Mr. Heard is an 89 yo M with PMH of CVA, PVD, HL who presented from [facility] with new onset Afib and acute systolic CHF. S/P cath with severe left main disease, no CV surg candidate and discussion ongoing on possible cath intervention.     1. Acute systolic CHF- EF 15-20%   -Seems nearly euvolemic- lasix 20 mg daily   -GDMT per cards-> started Lisinopril 2.5 daily , metoprolol 50 BID  -planned for PCI to left main per cards as outpatient with impella assist   -CTA planned today per cards  -Cardioversion tomorrow per cards   Repeat Echo outpatient in hopes for improved EF   No CV surg candidate        New onset Afib with RVR  - Rates now controlled   - cardiology consulted  - Lovenox with plans for oral AC tomorrow  -Metoprolol   -DCCV 2/8/24     OZZIE vs. CKD- resolved, creatine back to baseline   -Continue to trend     Hx CVA  - continue plavix if ok per cards      PVD/HL  - statin- consider increased intensity      DM2 A1c 6.6  Would benefit from jardiance from CHF standpoint - defer to cards       ACP  - CODE- DNR/full- confirmed with patient  - POA- wife, then daughter  - lives with spouse at independent living     FN:  - IVF: none  - Diet: cardiac      Questions/concerns were discussed with patient and/or family by bedside.      Thank You,  Alex Peña, DO    Hospitalist with [facility]     Subjective:     Feels ok, no fevers or chills, no chest pains but does have some mild SOB today, no LE edema noted     OBJECTIVE:    Blood pressure 103/71, pulse 109, temperature 98 °F (36.7 °C), temperature source Oral, resp. rate 18, height 6' 1\" (1.854 m), weight 157 lb 6.4 oz (71.4 kg), SpO2 94%.    Temp:  [97.7 °F  (36.5 °C)-98 °F (36.7 °C)] 98 °F (36.7 °C)  Pulse:  [100-140] 109  Resp:  [18-20] 18  BP: ()/(71-93) 103/71  SpO2:  [93 %-95 %] 94 %      Intake/Output:    Intake/Output Summary (Last 24 hours) at 2/7/2024 1319  Last data filed at 2/7/2024 0900  Gross per 24 hour   Intake 840 ml   Output 450 ml   Net 390 ml       Last 3 Weights   02/07/24 0522 157 lb 6.4 oz (71.4 kg)   02/06/24 0400 150 lb 1.6 oz (68.1 kg)   02/05/24 0500 152 lb 8 oz (69.2 kg)   02/04/24 0517 154 lb (69.9 kg)   02/03/24 0519 156 lb (70.8 kg)   02/02/24 0450 156 lb 9.6 oz (71 kg)   02/01/24 1341 161 lb 12.8 oz (73.4 kg)   02/01/24 1200 161 lb 12.8 oz (73.4 kg)   02/01/24 1059 160 lb (72.6 kg)   10/18/21 0813 168 lb (76.2 kg)   07/02/21 0827 167 lb 9.6 oz (76 kg)       Exam   Gen: No acute distress, alert and oriented x3  Heent: NC AT, neck supple  Pulm: Lungs clear, normal respiratory effort  CV: Irregularly irregular , no murmurs   Abd: Abdomen soft, nontender, nondistended, bowel sounds present  Skin: no rashes or lesions  Neuro: AO*3, motor intact, no sensory deficits  Psyc: appropriate mood and affect      Data Review:       Labs:     Recent Labs   Lab 02/01/24  1103 02/02/24  0540 02/04/24  0642 02/05/24  0616 02/06/24  0723   RBC 3.28*   < > 3.59* 3.27* 3.50*   HGB 10.5*   < > 12.0* 10.8* 11.6*   HCT 32.5*   < > 35.1* 31.9* 34.6*   MCV 99.1   < > 97.8 97.6 98.9   MCH 32.0   < > 33.4 33.0 33.1   MCHC 32.3   < > 34.2 33.9 33.5   RDW 12.7   < > 12.7 12.6 12.5   NEPRELIM 4.81  --   --   --   --    WBC 6.7   < > 11.8* 9.0 10.2   .0   < > 273.0 234.0 235.0    < > = values in this interval not displayed.         Recent Labs   Lab 02/05/24  0616 02/05/24  1255 02/06/24  0723   * 130* 135*   BUN 32* 31* 27*   CREATSERUM 1.61* 1.40* 1.20   EGFRCR 41* 48* 58*   CA 8.9 9.2 9.0    141 141   K 3.8 5.0 3.9    109 109   CO2 27.0 26.0 25.0       Recent Labs   Lab 02/01/24  1103   ALT 20   AST 25   ALB 3.8         Imaging:  No  results found.      Meds:      lisinopril  2.5 mg Oral Daily    furosemide  20 mg Oral Daily    metoprolol tartrate  50 mg Oral 2x Daily(Beta Blocker)    enoxaparin  1 mg/kg Subcutaneous q12h    insulin aspart  1-5 Units Subcutaneous TID CC    atorvastatin  20 mg Oral Daily       cyclobenzaprine, metoclopramide, ipratropium-albuterol, glucose **OR** glucose **OR** glucose-vitamin C **OR** dextrose **OR** glucose **OR** glucose **OR** glucose-vitamin C, acetaminophen, ondansetron

## 2024-02-07 NOTE — PLAN OF CARE
Patient having some neck pain, muscle relaxer added by MD. Patient and family had discussion with CV surgery today, see note. If family is to be updated by phone have MD/staff call Yumiko (daughter) as listed in contacts.     Problem: Patient Centered Care  Goal: Patient preferences are identified and integrated in the patient's plan of care  Description: Interventions:  - What would you like us to know as we care for you? I live at an independent living facility with my wife.   - Provide timely, complete, and accurate information to patient/family  - Incorporate patient and family knowledge, values, beliefs, and cultural backgrounds into the planning and delivery of care  - Encourage patient/family to participate in care and decision-making at the level they choose  - Honor patient and family perspectives and choices  Outcome: Progressing     Problem: Patient/Family Goals  Goal: Patient/Family Long Term Goal  Description: Patient's Long Term Goal: to go home    Interventions:  - rate control  -diuretics  - See additional Care Plan goals for specific interventions  Outcome: Progressing  Goal: Patient/Family Short Term Goal  Description: Patient's Short Term Goal: to feel better    Interventions:   - rate control  -diuretics  - See additional Care Plan goals for specific interventions  Outcome: Progressing     Problem: CARDIOVASCULAR - ADULT  Goal: Maintains optimal cardiac output and hemodynamic stability  Description: INTERVENTIONS:  - Monitor vital signs, rhythm, and trends  - Monitor for bleeding, hypotension and signs of decreased cardiac output  - Evaluate effectiveness of vasoactive medications to optimize hemodynamic stability  - Monitor arterial and/or venous puncture sites for bleeding and/or hematoma  - Assess quality of pulses, skin color and temperature  - Assess for signs of decreased coronary artery perfusion - ex. Angina  - Evaluate fluid balance, assess for edema, trend weights  Outcome:  Progressing  Goal: Absence of cardiac arrhythmias or at baseline  Description: INTERVENTIONS:  - Continuous cardiac monitoring, monitor vital signs, obtain 12 lead EKG if indicated  - Evaluate effectiveness of antiarrhythmic and heart rate control medications as ordered  - Initiate emergency measures for life threatening arrhythmias  - Monitor electrolytes and administer replacement therapy as ordered  Outcome: Progressing     Problem: GENITOURINARY - ADULT  Goal: Absence of urinary retention  Description: INTERVENTIONS:  - Assess patient’s ability to void and empty bladder  - Monitor intake/output and perform bladder scan as needed  - Follow urinary retention protocol/standard of care  - Consider collaborating with pharmacy to review patient's medication profile  - Implement strategies to promote bladder emptying  Outcome: Progressing     Problem: METABOLIC/FLUID AND ELECTROLYTES - ADULT  Goal: Glucose maintained within prescribed range  Description: INTERVENTIONS:  - Monitor Blood Glucose as ordered  - Assess for signs and symptoms of hyperglycemia and hypoglycemia  - Administer ordered medications to maintain glucose within target range  - Assess barriers to adequate nutritional intake and initiate nutrition consult as needed  - Instruct patient on self management of diabetes  Outcome: Progressing  Goal: Electrolytes maintained within normal limits  Description: INTERVENTIONS:  - Monitor labs and rhythm and assess patient for signs and symptoms of electrolyte imbalances  - Administer electrolyte replacement as ordered  - Monitor response to electrolyte replacements, including rhythm and repeat lab results as appropriate  - Fluid restriction as ordered  - Instruct patient on fluid and nutrition restrictions as appropriate  Outcome: Progressing  Goal: Hemodynamic stability and optimal renal function maintained  Description: INTERVENTIONS:  - Monitor labs and assess for signs and symptoms of volume excess or  deficit  - Monitor intake, output and patient weight  - Monitor urine specific gravity, serum osmolarity and serum sodium as indicated or ordered  - Monitor response to interventions for patient's volume status, including labs, urine output, blood pressure (other measures as available)  - Encourage oral intake as appropriate  - Instruct patient on fluid and nutrition restrictions as appropriate  Outcome: Progressing     Problem: HEMATOLOGIC - ADULT  Goal: Maintains hematologic stability  Description: INTERVENTIONS  - Assess for signs and symptoms of bleeding or hemorrhage  - Monitor labs and vital signs for trends  - Administer supportive blood products/factors, fluids and medications as ordered and appropriate  - Administer supportive blood products/factors as ordered and appropriate  Outcome: Progressing  Goal: Free from bleeding injury  Description: (Example usage: patient with low platelets)  INTERVENTIONS:  - Avoid intramuscular injections, enemas and rectal medication administration  - Ensure safe mobilization of patient  - Hold pressure on venipuncture sites to achieve adequate hemostasis  - Assess for signs and symptoms of internal bleeding  - Monitor lab trends  - Patient is to report abnormal signs of bleeding to staff  - Avoid use of toothpicks and dental floss  - Use electric shaver for shaving  - Use soft bristle tooth brush  - Limit straining and forceful nose blowing  Outcome: Progressing

## 2024-02-07 NOTE — PROGRESS NOTES
[facility]    Cardiology Progress Note    Edward Heard Patient Status:  Inpatient    1934 MRN [id number]   Location [facility] 3W/SW Attending Aaliyah Alva MD   Hosp Day # 6 PCP Donavan Noonan MD     Briefly, patient is a 88 yo man with HDL, hx TIA, PVD presenting with CHF and in afib wit RVR. Initially received IV lasix and dilt IV for HR control. He had TTE today, which shows severely depressed LVEF to 15-20% with global hypokinesis.     Impression:  Atrial fibrillation with RVR  - Euvolemic/mildly hypovolemic at this time given rise in Cr from the weekend. His rate ranges from 100-130 bpm but occasionally rises to 150 bpm.   Congestive heart failure 2/2 cardiomyopathy (LVEF 10-15), likely ischemic  - Euvolemic/mildly hypovolemic at this time given rise in Cr from the weekend.  - Cath showing severe proximal LM disease, severe disease of small circumflex, mild mid RCA disease.  - RHC showing patient is euvolemic with PCWP 12 mmHg, LVEDP 6-10 mmHg, CI 2.4 L/min/m2   - Patient not a surgical candidate. Has been seen by CT surgery.  HDL  TIA  PVD  CAD with severe, calcified proximal left main disease  - Patient not a surgical candidate. Patient and family would like to proceed with high risk PCI of the proximal LM.  - Given high risk nature of the case, will proceed with pre-planning and optimizing patient for PCI.     Recommendations:  IV diuresis: convert to PO lasix 20mg today. Patient is coughing more and he has been on diuresis holiday since Monday.  GDMT: metoprolol 50mg BID. Will convert to succinate 100mg on discharge.  Start lisinopril 2.5mg daily  Atorvastatin 40mg daily  Metoprolol tartrate 50 mg BID, can uptitrate for HR > 140 bpm consistently. Elevated HR to some degree is compensatory for reduced LVEF.  Discontinue hydralazine given soft BP and will uptitrate GDMT instead.  CTA A/P with run off to assess PAD and allow Impella  pre-planning  FELI/cardioversion  Continue AC (lovenox)  Will have patient seen in cardiology clinic, repeat TTE in 4-6 weeks, and plan for PCI as outpatient.       Subjective:     PND/orthopnea last night. No chest pain. Cr improved to 1.2 yesterday. He is coughing a bit more but otherwise has not noted any other swelling. HR has been in the 110 bpm range for the most part.      Patient Active Problem List   Diagnosis    CHRONIC AIRWAY OBSTRUCTION    Carotid disease, bilateral (HCC)    Uncontrolled type 2 diabetes mellitus with hyperglycemia, without long-term current use of insulin (HCC)    Bilateral pseudophakia    Arcus senilis of cornea, bilateral    Atrial fibrillation, new onset (HCC)    Congestive heart failure, unspecified HF chronicity, unspecified heart failure type ([facility])       Objective:   Temp: 98 °F (36.7 °C)  Pulse: 109  Resp: 18  BP: 103/71    Intake/Output:     Intake/Output Summary (Last 24 hours) at 2/7/2024 1317  Last data filed at 2/7/2024 0900  Gross per 24 hour   Intake 840 ml   Output 450 ml   Net 390 ml       Last 3 Weights   02/07/24 0522 157 lb 6.4 oz (71.4 kg)   02/06/24 0400 150 lb 1.6 oz (68.1 kg)   02/05/24 0500 152 lb 8 oz (69.2 kg)   02/04/24 0517 154 lb (69.9 kg)   02/03/24 0519 156 lb (70.8 kg)   02/02/24 0450 156 lb 9.6 oz (71 kg)   02/01/24 1341 161 lb 12.8 oz (73.4 kg)   02/01/24 1200 161 lb 12.8 oz (73.4 kg)   02/01/24 1059 160 lb (72.6 kg)   10/18/21 0813 168 lb (76.2 kg)   07/02/21 0827 167 lb 9.6 oz (76 kg)       Tele: AF    Physical Exam:    General: awake, alert, oriented x 3, no acute distress  HEENT: at/nc, perrl, eomi  Neck: supple  Cardiac: irregularly irregular, S1, S2 normal, no murmur, rub or gallop.  Lungs: No rhonchi or dullness.  Normal excursions and effort.   Abdomen: Soft, non-tender, non-distended, normal bowel sounds   Extremities: Without clubbing, cyanosis. . trace pitting edema to ankles bilaterally.  Neurologic: Alert and oriented, normal affect.  Psych:  normal mood and affect  Skin: Warm and dry.   Laboratory/Data:    Labs:         Recent Labs   Lab 02/01/24  1103 02/02/24  0540 02/04/24  0642 02/05/24  0616 02/06/24  0723   WBC 6.7 8.4 11.8* 9.0 10.2   HGB 10.5* 11.6* 12.0* 10.8* 11.6*   MCV 99.1 98.3 97.8 97.6 98.9   .0 254.0 273.0 234.0 235.0       Recent Labs   Lab 02/03/24  0630 02/04/24  0642 02/05/24  0616 02/05/24  1255 02/06/24  0723    143 142 141 141   K 4.4 4.2 3.8 5.0 3.9    108 109 109 109   CO2 27.0 28.0 27.0 26.0 25.0   BUN 30* 33* 32* 31* 27*   CREATSERUM 1.44* 1.48* 1.61* 1.40* 1.20   CA 9.3 9.4 8.9 9.2 9.0   MG 1.9 2.0 2.0  --  2.1   * 139* 140* 130* 135*       Recent Labs   Lab 02/01/24  1103   ALT 20   AST 25   ALB 3.8       No results for input(s): \"TROP\" in the last 168 hours.      ECHO:  1. Study data: Atrial fibrillation   2. Left ventricle: The cavity size was at the upper limits of normal. Wall      thickness was normal. Systolic function was markedly reduced. The      estimated ejection fraction was 15-20%, by visual assessment. Unable to      assess LV diastolic function due to heart rhythm.   3. Right ventricle: Systolic function was mildly reduced.   4. Left atrium: The left atrial volume was mildly to moderately increased.   5. Right atrium: The estimated central venous pressure is 8mm Hg.   6. Mitral valve: There was mild to moderate regurgitation.   Impressions:  No previous study was available for comparison.     Coronary angiogram 2/5/24  Left main: Severe calcified ostial-proximal stenosis of the left main coronary artery.  LAD: Large vessel that wraps the apex. Luminal irregularities.  Ramus: Large vessel with luminal irregularities.  Left circumflex: Large vessel giving rise to a large OM branch. Severe tubular 80% lesion of the ostial to proximal OM.  RCA: Anterior takeoff. Large caliber vessel that gives rise to the PDA. Luminal irregularities.    Right heart cath 2/5/24  RA 4/4/3  RV 25/1/4  PCWP  16/13/12  PA 23/13/18  Ao 97/36/58  LVEDP 6 mmHg     PA sat 52%     Laurie Cardiac Output 4.7 L/min  Laurie Cardiac Index 2.4 L/min/m2    Allergies:   No Known Allergies    Medications:    Mau Grigsby MD  Interventional Cardiology

## 2024-02-07 NOTE — PHYSICAL THERAPY NOTE
PHYSICAL THERAPY TREATMENT NOTE - INPATIENT     Room Number: 330/330-A       Presenting Problem: SOB, cough, atrial fibrillation  Co-Morbidities : Hx CVA    Problem List  Principal Problem:    Atrial fibrillation, new onset (HCC)  Active Problems:    Congestive heart failure, unspecified HF chronicity, unspecified heart failure type (HCC)      PHYSICAL THERAPY ASSESSMENT   Patient demonstrates good  progress this session, goals  remain in progress. Pt tachycardic 120s-150s at rest, 120s-160s with gait but asymptomatic throughout, medical team notified of findings.    Patient continues to function near baseline with bed mobility, transfers, and gait.  Pt motivated for out of bed mobility. Contributing factors to remaining limitations include decreased endurance/aerobic capacity and medical status.  Next session anticipate patient to progress gait.  Physical Therapy will continue to follow patient for duration of hospitalization.    Patient continues to benefit from continued skilled PT services: at discharge to promote functional independence in home.  Anticipate patient will return home with home health PT.    PLAN  PT Treatment Plan: Bed mobility;Body mechanics;Coordination;Endurance;Energy conservation;Patient education;Gait training;Transfer training;Balance training;Strengthening  Frequency (Obs): 5x/week    SUBJECTIVE  \"My neck will be fine as long as I don't move my head\"    OBJECTIVE  Precautions: Cardiac;Bed/chair alarm;Hard of hearing    WEIGHT BEARING RESTRICTION   none             PAIN ASSESSMENT   Ratin  Location: neck  Management Techniques: Body mechanics;Activity promotion;Repositioning    BALANCE  Static Sitting: Good  Dynamic Sitting: Good  Static Standing: Good  Dynamic Standing: Fair +    ACTIVITY TOLERANCE  Pulse: (!) 153 (126-153 supine in bed, RN notified, asymptomatic and remaining stable with sitting, 121-162 with gait, medical team notified of findings)  Heart Rate Source: Monitor      BP: 143/69  BP Location: Right arm  BP Method: Automatic  Patient Position: Lying     O2 WALK  Oxygen Therapy  SPO2% on Room Air at Rest: 95  SPO2% Ambulation on Room Air: 95    AM-PAC '6-Clicks' INPATIENT SHORT FORM - BASIC MOBILITY  How much difficulty does the patient currently have...  Patient Difficulty: Turning over in bed (including adjusting bedclothes, sheets and blankets)?: A Little   Patient Difficulty: Sitting down on and standing up from a chair with arms (e.g., wheelchair, bedside commode, etc.): A Little   Patient Difficulty: Moving from lying on back to sitting on the side of the bed?: A Little   How much help from another person does the patient currently need...   Help from Another: Moving to and from a bed to a chair (including a wheelchair)?: A Little   Help from Another: Need to walk in hospital room?: A Little   Help from Another: Climbing 3-5 steps with a railing?: A Little     AM-PAC Score:  Raw Score: 18   Approx Degree of Impairment: 46.58%   Standardized Score (AM-PAC Scale): 43.63   CMS Modifier (G-Code): CK    FUNCTIONAL ABILITY STATUS  Functional Mobility/Gait Assessment  Gait Assistance: Contact guard assist;Other (Comment) (progressed to SBA)  Distance (ft): 75'  Assistive Device: Rolling walker  Pattern: Comment (narrow TRINIDAD with slight BLE external rotation, kyphotic posture, moderate pace with cues for activity pacing, no LOB)    Supine to Sit: stand-by assist and contact guard assist  Sit to Supine: contact guard assist  Sit to Stand: contact guard assist from edge of bed     Additional information:   Patient received semi-fowlers in bed, agreeable to physical therapy. Vital signs monitored as noted above, patient experiencing  fluctuating HR throughout session limiting further ambulation distance, remained asymptomatic, RN notified during session and acknowledged  .     Education with patient provided on Physical therapy plan of care and physiological benefits of out of bed  mobility. Patient with good carryover during session. Anticipated therapy needs remain appropriate based on the patient's performance, personal factors, and remaining functional impairments.    The patient's Approx Degree of Impairment: 46.58% has been calculated based on documentation in the [facility] '6 clicks' Inpatient Daily Activity Short Form.  Research supports that patients with this level of impairment may benefit from home with home-health PT.  Final disposition will be made by interdisciplinary medical team.      Patient End of Session: In bed;Needs met;Call light within reach;RN aware of session/findings;All patient questions and concerns addressed;Alarm set    CURRENT GOALS   Goals to be met by: 2/16/24  Patient Goal Patient's self-stated goal is: go home   Goal #1 Patient is able to demonstrate supine - sit EOB @ level: supervision      Goal #1   Current Status     Goal #2 Patient is able to demonstrate transfers Sit to/from Stand at assistance level: supervision with  LRAD      Goal #2  Current Status     Goal #3 Patient is able to ambulate 100 feet with assist device:  LRAD  at assistance level: supervision   Goal #3   Current Status     Goal #4 Patient to demonstrate independence with home activity/exercise instructions provided to patient in preparation for discharge.   Goal #4   Current Status         Gait Training: 15 minutes    Sneha Salter, HUI  [facility]  DPT Candidate 2024     I provided clinical instruction and supervision for the duration of this session and agree with the above documentation.    Leonila Bains, PT, DPT  [facility]

## 2024-02-07 NOTE — PLAN OF CARE
Patient standby assist. Tylenol and heat packs given for neck pain. Fall precautions in place.    Problem: Patient Centered Care  Goal: Patient preferences are identified and integrated in the patient's plan of care  Description: Interventions:  - What would you like us to know as we care for you? I live at an independent living facility with my wife.   - Provide timely, complete, and accurate information to patient/family  - Incorporate patient and family knowledge, values, beliefs, and cultural backgrounds into the planning and delivery of care  - Encourage patient/family to participate in care and decision-making at the level they choose  - Honor patient and family perspectives and choices  Outcome: Progressing     Problem: Patient/Family Goals  Goal: Patient/Family Long Term Goal  Description: Patient's Long Term Goal: to go home    Interventions:  - rate control  -diuretics  - See additional Care Plan goals for specific interventions  Outcome: Progressing  Goal: Patient/Family Short Term Goal  Description: Patient's Short Term Goal: to feel better    Interventions:   - rate control  -diuretics  - See additional Care Plan goals for specific interventions  Outcome: Progressing     Problem: CARDIOVASCULAR - ADULT  Goal: Maintains optimal cardiac output and hemodynamic stability  Description: INTERVENTIONS:  - Monitor vital signs, rhythm, and trends  - Monitor for bleeding, hypotension and signs of decreased cardiac output  - Evaluate effectiveness of vasoactive medications to optimize hemodynamic stability  - Monitor arterial and/or venous puncture sites for bleeding and/or hematoma  - Assess quality of pulses, skin color and temperature  - Assess for signs of decreased coronary artery perfusion - ex. Angina  - Evaluate fluid balance, assess for edema, trend weights  Outcome: Progressing  Goal: Absence of cardiac arrhythmias or at baseline  Description: INTERVENTIONS:  - Continuous cardiac monitoring, monitor  vital signs, obtain 12 lead EKG if indicated  - Evaluate effectiveness of antiarrhythmic and heart rate control medications as ordered  - Initiate emergency measures for life threatening arrhythmias  - Monitor electrolytes and administer replacement therapy as ordered  Outcome: Progressing     Problem: GENITOURINARY - ADULT  Goal: Absence of urinary retention  Description: INTERVENTIONS:  - Assess patient’s ability to void and empty bladder  - Monitor intake/output and perform bladder scan as needed  - Follow urinary retention protocol/standard of care  - Consider collaborating with pharmacy to review patient's medication profile  - Implement strategies to promote bladder emptying  Outcome: Progressing     Problem: METABOLIC/FLUID AND ELECTROLYTES - ADULT  Goal: Glucose maintained within prescribed range  Description: INTERVENTIONS:  - Monitor Blood Glucose as ordered  - Assess for signs and symptoms of hyperglycemia and hypoglycemia  - Administer ordered medications to maintain glucose within target range  - Assess barriers to adequate nutritional intake and initiate nutrition consult as needed  - Instruct patient on self management of diabetes  Outcome: Progressing  Goal: Electrolytes maintained within normal limits  Description: INTERVENTIONS:  - Monitor labs and rhythm and assess patient for signs and symptoms of electrolyte imbalances  - Administer electrolyte replacement as ordered  - Monitor response to electrolyte replacements, including rhythm and repeat lab results as appropriate  - Fluid restriction as ordered  - Instruct patient on fluid and nutrition restrictions as appropriate  Outcome: Progressing  Goal: Hemodynamic stability and optimal renal function maintained  Description: INTERVENTIONS:  - Monitor labs and assess for signs and symptoms of volume excess or deficit  - Monitor intake, output and patient weight  - Monitor urine specific gravity, serum osmolarity and serum sodium as indicated or  ordered  - Monitor response to interventions for patient's volume status, including labs, urine output, blood pressure (other measures as available)  - Encourage oral intake as appropriate  - Instruct patient on fluid and nutrition restrictions as appropriate  Outcome: Progressing     Problem: HEMATOLOGIC - ADULT  Goal: Maintains hematologic stability  Description: INTERVENTIONS  - Assess for signs and symptoms of bleeding or hemorrhage  - Monitor labs and vital signs for trends  - Administer supportive blood products/factors, fluids and medications as ordered and appropriate  - Administer supportive blood products/factors as ordered and appropriate  Outcome: Progressing  Goal: Free from bleeding injury  Description: (Example usage: patient with low platelets)  INTERVENTIONS:  - Avoid intramuscular injections, enemas and rectal medication administration  - Ensure safe mobilization of patient  - Hold pressure on venipuncture sites to achieve adequate hemostasis  - Assess for signs and symptoms of internal bleeding  - Monitor lab trends  - Patient is to report abnormal signs of bleeding to staff  - Avoid use of toothpicks and dental floss  - Use electric shaver for shaving  - Use soft bristle tooth brush  - Limit straining and forceful nose blowing  Outcome: Progressing

## 2024-02-07 NOTE — DIETARY NOTE
BRIEF DIETITIAN NOTE     Pt re-screened for LOS (length of stay). Found to be at no nutrition risk at this time. Good PO intakes, 100% at most meals. Weight relatively stable, no significant changes. Please consult RD if further nutrition intervention is needed.     Percent Meals Eaten (last 6 days)       Date/Time Percent Meals Eaten (%)    02/01/24 1900 100 %    02/02/24 0813 100 %    02/02/24 1100 100 %    02/03/24 0900 100 %    02/03/24 1201 100 %    02/03/24 1815 240 %    02/04/24 0800 100 %    02/04/24 1300 100 %    02/04/24 1630 100 %    02/04/24 1857 100 %    02/05/24 1900 100 %    02/06/24 0904 100 %    02/06/24 1850 0 %     Percent Meals Eaten (%): ordered dinks only at 02/06/24 1850    02/07/24 0848 100 %             Patient Weight(s) for the past 336 hrs:   Weight   02/07/24 0522 71.4 kg (157 lb 6.4 oz)   02/06/24 0400 68.1 kg (150 lb 1.6 oz)   02/05/24 0500 69.2 kg (152 lb 8 oz)   02/04/24 0517 69.9 kg (154 lb)   02/03/24 0519 70.8 kg (156 lb)   02/02/24 0450 71 kg (156 lb 9.6 oz)   02/01/24 1341 73.4 kg (161 lb 12.8 oz)   02/01/24 1200 73.4 kg (161 lb 12.8 oz)   02/01/24 1059 72.6 kg (160 lb)        Wt Readings from Last 6 Encounters:   02/07/24 71.4 kg (157 lb 6.4 oz)   10/18/21 76.2 kg (168 lb)   07/02/21 76 kg (167 lb 9.6 oz)   10/15/20 78.9 kg (174 lb)   11/07/19 80.6 kg (177 lb 12.8 oz)   10/07/19 78.5 kg (173 lb)        F/u per protocol or as appropriate.       Milvia Yee RD, LDN  Clinical Dietitian  P: [phone]

## 2024-02-08 LAB
ANION GAP SERPL CALC-SCNC: 7 MMOL/L (ref 0–18)
BUN BLD-MCNC: 20 MG/DL (ref 9–23)
BUN/CREAT SERPL: 17.1 (ref 10–20)
CALCIUM BLD-MCNC: 8.9 MG/DL (ref 8.7–10.4)
CHLORIDE SERPL-SCNC: 109 MMOL/L (ref 98–112)
CO2 SERPL-SCNC: 24 MMOL/L (ref 21–32)
CREAT BLD-MCNC: 1.17 MG/DL
EGFRCR SERPLBLD CKD-EPI 2021: 60 ML/MIN/1.73M2 (ref 60–?)
GLUCOSE BLD-MCNC: 120 MG/DL (ref 70–99)
GLUCOSE BLDC GLUCOMTR-MCNC: 122 MG/DL (ref 70–99)
GLUCOSE BLDC GLUCOMTR-MCNC: 176 MG/DL (ref 70–99)
GLUCOSE BLDC GLUCOMTR-MCNC: 194 MG/DL (ref 70–99)
OSMOLALITY SERPL CALC.SUM OF ELEC: 294 MOSM/KG (ref 275–295)
POTASSIUM SERPL-SCNC: 3.8 MMOL/L (ref 3.5–5.1)
SODIUM SERPL-SCNC: 140 MMOL/L (ref 136–145)

## 2024-02-08 PROCEDURE — 82962 GLUCOSE BLOOD TEST: CPT

## 2024-02-08 PROCEDURE — 80048 BASIC METABOLIC PNL TOTAL CA: CPT | Performed by: INTERNAL MEDICINE

## 2024-02-08 NOTE — PROGRESS NOTES
[facility] Hospitalist Progress Note     CC: Hospital Follow up    PCP: Donavan Noonan MD       Assessment/Plan:     Principal Problem:    Atrial fibrillation, new onset (HCC)  Active Problems:    Congestive heart failure, unspecified HF chronicity, unspecified heart failure type (HCC)      Mr. Heard is an 88 yo M with PMH of CVA, PVD, HL who presented from [facility] with new onset Afib and acute systolic CHF. S/P cath with severe left main disease, no CV surg candidate and discussion ongoing on possible cath intervention.     1. Acute systolic CHF- EF 15-20%   -Seems nearly euvolemic- lasix 20 mg daily   -GDMT per cards-> started Lisinopril 2.5 daily , metoprolol 50 BID- titration per cards   -planned for PCI to left main per cards as outpatient with impella assist   -CTA planned today per cards  -Cardioversion Friday   Repeat Echo outpatient in hopes for improved EF   Not CV surg candidate        New onset Afib with RVR  - Rates now controlled   - cardiology consulted  - Lovenox with plans for oral AC after cardioversion   -Metoprolol   -DCCV 2/8/24     OZZIE vs. CKD- resolved, creatine back to baseline   -Continue to trend     Hx CVA  - continue plavix if ok per cards      PVD/HL  - statin- consider increased intensity      DM2 A1c 6.6  Would benefit from jardiance from CHF standpoint - defer to cards       ACP  - CODE- DNR/full- confirmed with patient  - POA- wife, then daughter  - lives with spouse at independent living     FN:  - IVF: none  - Diet: cardiac      Questions/concerns were discussed with patient and/or family by bedside.      Thank You,  Alex Peña,     Hospitalist with [facility]     Subjective:     Feels ok, no chest pain, no fevers, filled out POLST form     OBJECTIVE:    Blood pressure 94/64, pulse (!) 134, temperature 97.8 °F (36.6 °C), temperature source Oral, resp. rate 18, height 6' 1\" (1.854 m), weight 155 lb 8 oz (70.5 kg), SpO2 93%.    Temp:  [97.8 °F (36.6 °C)-97.9 °F  (36.6 °C)] 97.8 °F (36.6 °C)  Pulse:  [119-162] 134  Resp:  [18-20] 18  BP: ()/(60-82) 94/64  SpO2:  [92 %-95 %] 93 %      Intake/Output:    Intake/Output Summary (Last 24 hours) at 2/8/2024 1410  Last data filed at 2/8/2024 0825  Gross per 24 hour   Intake 290 ml   Output 675 ml   Net -385 ml       Last 3 Weights   02/08/24 0434 155 lb 8 oz (70.5 kg)   02/07/24 0522 157 lb 6.4 oz (71.4 kg)   02/06/24 0400 150 lb 1.6 oz (68.1 kg)   02/05/24 0500 152 lb 8 oz (69.2 kg)   02/04/24 0517 154 lb (69.9 kg)   02/03/24 0519 156 lb (70.8 kg)   02/02/24 0450 156 lb 9.6 oz (71 kg)   02/01/24 1341 161 lb 12.8 oz (73.4 kg)   02/01/24 1200 161 lb 12.8 oz (73.4 kg)   02/01/24 1059 160 lb (72.6 kg)   10/18/21 0813 168 lb (76.2 kg)   07/02/21 0827 167 lb 9.6 oz (76 kg)       Exam   Gen: No acute distress, alert and oriented x3  Heent: NC AT, neck supple  Pulm: Lungs clear, normal respiratory effort  CV: Irregularly irregular , no murmurs   Abd: Abdomen soft, nontender, nondistended, bowel sounds present  Skin: no rashes or lesions  Neuro: AO*3, motor intact, no sensory deficits  Psyc: appropriate mood and affect      Data Review:       Labs:     Recent Labs   Lab 02/04/24  0642 02/05/24  0616 02/06/24  0723   RBC 3.59* 3.27* 3.50*   HGB 12.0* 10.8* 11.6*   HCT 35.1* 31.9* 34.6*   MCV 97.8 97.6 98.9   MCH 33.4 33.0 33.1   MCHC 34.2 33.9 33.5   RDW 12.7 12.6 12.5   WBC 11.8* 9.0 10.2   .0 234.0 235.0         Recent Labs   Lab 02/06/24  0723 02/07/24  1455 02/08/24  0658   * 162* 120*   BUN 27* 22 20   CREATSERUM 1.20 1.26 1.17   EGFRCR 58* 55* 60   CA 9.0 9.5 8.9    141 140   K 3.9 5.5* 3.8    109 109   CO2 25.0 26.0 24.0       No results for input(s): \"ALT\", \"AST\", \"ALB\", \"AMYLASE\", \"LIPASE\", \"LDH\" in the last 168 hours.    Invalid input(s): \"ALPHOS\", \"TBIL\", \"DBIL\", \"TPROT\"        Imaging:  CTA ABDOMEN/PELVIS LOWER EXT BILAT W RUNOFF (EXV=[id number])    Result Date: 2/7/2024  CONCLUSION:  1. Right Lower  Extremity: Minimal atherosclerosis of the iliac arteries without hemodynamically significant stenosis or occlusion; mild atheromatous plaque of the distal common femoral artery contribute to mild luminal narrowing; scattered atherosclerosis of the right superficial femoral artery with minimal luminal narrowing; there is 2-vessel runoff to the level of the ankle via the anterior tibial and peroneal arteries.  2. 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  3. Bilateral pleural effusions and associated basilar atelectasis, with or without superimposed pneumonia.  4. Uncomplicated distal colonic diverticulosis.   5. Prostatomegaly with chronic outlet obstruction.  6. Low-density appearance of the intracardiac blood pool raises the possibility of underlying anemia. Correlate with hematologic parameters.  7. Lesser incidental findings as above.    Dictated by (CST): Vick Coffey MD on 2/07/2024 at 8:02 PM     Finalized by (CST): Vick Coffey MD on 2/07/2024 at 8:20 PM             Meds:      lisinopril  2.5 mg Oral Daily    furosemide  20 mg Oral Daily    metoprolol tartrate  50 mg Oral 2x Daily(Beta Blocker)    enoxaparin  1 mg/kg Subcutaneous q12h    insulin aspart  1-5 Units Subcutaneous TID CC    atorvastatin  20 mg Oral Daily       cyclobenzaprine, metoclopramide, ipratropium-albuterol, glucose **OR** glucose **OR** glucose-vitamin C **OR** dextrose **OR** glucose **OR** glucose **OR** glucose-vitamin C, acetaminophen, ondansetron

## 2024-02-08 NOTE — PROGRESS NOTES
[facility]    Cardiology Progress Note    Edward Heard Patient Status:  Inpatient    1934 MRN [id number]   Location [facility] 3W/SW Attending Aaliyah Alva MD   Hosp Day # 7 PCP Donavan Noonan MD     Briefly, patient is a 89 yo man with HDL, hx TIA, PVD presenting with CHF and in afib wit RVR. Initially received IV lasix and dilt IV for HR control. He had TTE today, which shows severely depressed LVEF to 15-20% with global hypokinesis.     Impression:  Atrial fibrillation with RVR  - His rate ranges from 100-130 bpm but occasionally rises to 150 bpm.   Congestive heart failure 2/2 cardiomyopathy (LVEF 10-15), likely ischemic but differential includes tachycardia-mediated from afib with RVR.  - Euvolemic/mildly hypovolemic at this time given rise in Cr from the weekend.  - Cath showing severe proximal LM disease, severe disease of small circumflex, mild mid RCA disease.  - RHC with PCWP 12 mmHg, LVEDP 6-10 mmHg, CI 2.4 L/min/m2   - Patient not a surgical candidate. Has been seen by CT surgery.  - Plan to start on GDMT, cardiovert, and re-evaluate LV function as an outpatient. Will proceed with Impella-assisted PCI as an outpatient.  HDL  TIA  PVD  CAD with severe, calcified proximal left main disease  - Patient not a surgical candidate. Patient and family would like to proceed with high risk PCI of the proximal LM.  - Given high risk nature of the case, will proceed with pre-planning and optimizing patient for PCI.     Recommendations:  IV diuresis: convert to PO lasix 20mg today. Patient is coughing more and he has been on diuresis holiday since Monday.  GDMT: metoprolol 50mg BID. Will convert to succinate 100mg on discharge.  Start lisinopril 2.5mg daily  Atorvastatin 40mg daily  Metoprolol tartrate 50 mg BID, can uptitrate for HR > 140 bpm consistently. Elevated HR to some degree is compensatory for reduced LVEF.  Hydralazine d/c'ed. Started on lisinopril 2.5mg daily  CTA  A/P with run off to assess PAD and allow Impella pre-planning completed 2/7/24  FELI/cardioversion scheduled for Friday, Feb 9th.  Continue AC (lovenox)  Will have patient seen in cardiology clinic, repeat TTE in 4-6 weeks, and plan for PCI as outpatient.       Subjective:     PND/orthopnea last night. No chest pain. Cr improved and stable. HR has been in the 110 bpm range for the most part. Underwent CTA A/P with runoff to assess right femoral artery.      Patient Active Problem List   Diagnosis    CHRONIC AIRWAY OBSTRUCTION    Carotid disease, bilateral (HCC)    Uncontrolled type 2 diabetes mellitus with hyperglycemia, without long-term current use of insulin (HCC)    Bilateral pseudophakia    Arcus senilis of cornea, bilateral    Atrial fibrillation, new onset (HCC)    Congestive heart failure, unspecified HF chronicity, unspecified heart failure type ([facility])       Objective:   Temp: 97.8 °F (36.6 °C)  Pulse: 119  Resp: 18  BP: 93/60    Intake/Output:     Intake/Output Summary (Last 24 hours) at 2/8/2024 1003  Last data filed at 2/8/2024 0825  Gross per 24 hour   Intake 290 ml   Output 675 ml   Net -385 ml       Last 3 Weights   02/08/24 0434 155 lb 8 oz (70.5 kg)   02/07/24 0522 157 lb 6.4 oz (71.4 kg)   02/06/24 0400 150 lb 1.6 oz (68.1 kg)   02/05/24 0500 152 lb 8 oz (69.2 kg)   02/04/24 0517 154 lb (69.9 kg)   02/03/24 0519 156 lb (70.8 kg)   02/02/24 0450 156 lb 9.6 oz (71 kg)   02/01/24 1341 161 lb 12.8 oz (73.4 kg)   02/01/24 1200 161 lb 12.8 oz (73.4 kg)   02/01/24 1059 160 lb (72.6 kg)   10/18/21 0813 168 lb (76.2 kg)   07/02/21 0827 167 lb 9.6 oz (76 kg)       Tele: AF    Physical Exam:    General: awake, alert, oriented x 3, no acute distress  HEENT: at/nc, perrl, eomi  Neck: supple  Cardiac: irregularly irregular, S1, S2 normal, no murmur, rub or gallop.  Lungs: No rhonchi or dullness.  Normal excursions and effort.   Abdomen: Soft, non-tender, non-distended, normal bowel sounds   Extremities: Without  clubbing, cyanosis. . trace pitting edema to ankles bilaterally.  Neurologic: Alert and oriented, normal affect.  Psych: normal mood and affect  Skin: Warm and dry.   Laboratory/Data:    Labs:         Recent Labs   Lab 02/01/24  1103 02/02/24  0540 02/04/24  0642 02/05/24  0616 02/06/24  0723   WBC 6.7 8.4 11.8* 9.0 10.2   HGB 10.5* 11.6* 12.0* 10.8* 11.6*   MCV 99.1 98.3 97.8 97.6 98.9   .0 254.0 273.0 234.0 235.0       Recent Labs   Lab 02/03/24  0630 02/04/24  0642 02/05/24  0616 02/05/24  1255 02/06/24  0723 02/07/24  1455 02/08/24  0658    143 142 141 141 141 140   K 4.4 4.2 3.8 5.0 3.9 5.5* 3.8    108 109 109 109 109 109   CO2 27.0 28.0 27.0 26.0 25.0 26.0 24.0   BUN 30* 33* 32* 31* 27* 22 20   CREATSERUM 1.44* 1.48* 1.61* 1.40* 1.20 1.26 1.17   CA 9.3 9.4 8.9 9.2 9.0 9.5 8.9   MG 1.9 2.0 2.0  --  2.1  --   --    * 139* 140* 130* 135* 162* 120*       Recent Labs   Lab 02/01/24  1103   ALT 20   AST 25   ALB 3.8       No results for input(s): \"TROP\" in the last 168 hours.      ECHO:  1. Study data: Atrial fibrillation   2. Left ventricle: The cavity size was at the upper limits of normal. Wall      thickness was normal. Systolic function was markedly reduced. The      estimated ejection fraction was 15-20%, by visual assessment. Unable to      assess LV diastolic function due to heart rhythm.   3. Right ventricle: Systolic function was mildly reduced.   4. Left atrium: The left atrial volume was mildly to moderately increased.   5. Right atrium: The estimated central venous pressure is 8mm Hg.   6. Mitral valve: There was mild to moderate regurgitation.   Impressions:  No previous study was available for comparison.     Coronary angiogram 2/5/24  Left main: Severe calcified ostial-proximal stenosis of the left main coronary artery.  LAD: Large vessel that wraps the apex. Luminal irregularities.  Ramus: Large vessel with luminal irregularities.  Left circumflex: Large vessel giving rise  to a large OM branch. Severe tubular 80% lesion of the ostial to proximal OM.  RCA: Anterior takeoff. Large caliber vessel that gives rise to the PDA. Luminal irregularities.    Right heart cath 2/5/24  RA 4/4/3  RV 25/1/4  PCWP 16/13/12  PA 23/13/18  Ao 97/36/58  LVEDP 6 mmHg     PA sat 52%     Laurie Cardiac Output 4.7 L/min  Laurie Cardiac Index 2.4 L/min/m2    Allergies:   No Known Allergies    Medications:    Mau Grigsby MD  Interventional Cardiology

## 2024-02-08 NOTE — PLAN OF CARE
Patient is a standby assist. Heat packs given for neck pain. NPO since midnight for FELI & cardioversion today.     Problem: Patient Centered Care  Goal: Patient preferences are identified and integrated in the patient's plan of care  Description: Interventions:  - What would you like us to know as we care for you? I live at an independent living facility with my wife.   - Provide timely, complete, and accurate information to patient/family  - Incorporate patient and family knowledge, values, beliefs, and cultural backgrounds into the planning and delivery of care  - Encourage patient/family to participate in care and decision-making at the level they choose  - Honor patient and family perspectives and choices  Outcome: Progressing     Problem: Patient/Family Goals  Goal: Patient/Family Long Term Goal  Description: Patient's Long Term Goal: to go home    Interventions:  - rate control  -diuretics  - See additional Care Plan goals for specific interventions  Outcome: Progressing  Goal: Patient/Family Short Term Goal  Description: Patient's Short Term Goal: to feel better    Interventions:   - rate control  -diuretics  - See additional Care Plan goals for specific interventions  Outcome: Progressing     Problem: CARDIOVASCULAR - ADULT  Goal: Maintains optimal cardiac output and hemodynamic stability  Description: INTERVENTIONS:  - Monitor vital signs, rhythm, and trends  - Monitor for bleeding, hypotension and signs of decreased cardiac output  - Evaluate effectiveness of vasoactive medications to optimize hemodynamic stability  - Monitor arterial and/or venous puncture sites for bleeding and/or hematoma  - Assess quality of pulses, skin color and temperature  - Assess for signs of decreased coronary artery perfusion - ex. Angina  - Evaluate fluid balance, assess for edema, trend weights  Outcome: Progressing  Goal: Absence of cardiac arrhythmias or at baseline  Description: INTERVENTIONS:  - Continuous cardiac  monitoring, monitor vital signs, obtain 12 lead EKG if indicated  - Evaluate effectiveness of antiarrhythmic and heart rate control medications as ordered  - Initiate emergency measures for life threatening arrhythmias  - Monitor electrolytes and administer replacement therapy as ordered  Outcome: Progressing     Problem: GENITOURINARY - ADULT  Goal: Absence of urinary retention  Description: INTERVENTIONS:  - Assess patient’s ability to void and empty bladder  - Monitor intake/output and perform bladder scan as needed  - Follow urinary retention protocol/standard of care  - Consider collaborating with pharmacy to review patient's medication profile  - Implement strategies to promote bladder emptying  Outcome: Progressing     Problem: METABOLIC/FLUID AND ELECTROLYTES - ADULT  Goal: Glucose maintained within prescribed range  Description: INTERVENTIONS:  - Monitor Blood Glucose as ordered  - Assess for signs and symptoms of hyperglycemia and hypoglycemia  - Administer ordered medications to maintain glucose within target range  - Assess barriers to adequate nutritional intake and initiate nutrition consult as needed  - Instruct patient on self management of diabetes  Outcome: Progressing  Goal: Electrolytes maintained within normal limits  Description: INTERVENTIONS:  - Monitor labs and rhythm and assess patient for signs and symptoms of electrolyte imbalances  - Administer electrolyte replacement as ordered  - Monitor response to electrolyte replacements, including rhythm and repeat lab results as appropriate  - Fluid restriction as ordered  - Instruct patient on fluid and nutrition restrictions as appropriate  Outcome: Progressing  Goal: Hemodynamic stability and optimal renal function maintained  Description: INTERVENTIONS:  - Monitor labs and assess for signs and symptoms of volume excess or deficit  - Monitor intake, output and patient weight  - Monitor urine specific gravity, serum osmolarity and serum sodium  as indicated or ordered  - Monitor response to interventions for patient's volume status, including labs, urine output, blood pressure (other measures as available)  - Encourage oral intake as appropriate  - Instruct patient on fluid and nutrition restrictions as appropriate  Outcome: Progressing     Problem: HEMATOLOGIC - ADULT  Goal: Maintains hematologic stability  Description: INTERVENTIONS  - Assess for signs and symptoms of bleeding or hemorrhage  - Monitor labs and vital signs for trends  - Administer supportive blood products/factors, fluids and medications as ordered and appropriate  - Administer supportive blood products/factors as ordered and appropriate  Outcome: Progressing  Goal: Free from bleeding injury  Description: (Example usage: patient with low platelets)  INTERVENTIONS:  - Avoid intramuscular injections, enemas and rectal medication administration  - Ensure safe mobilization of patient  - Hold pressure on venipuncture sites to achieve adequate hemostasis  - Assess for signs and symptoms of internal bleeding  - Monitor lab trends  - Patient is to report abnormal signs of bleeding to staff  - Avoid use of toothpicks and dental floss  - Use electric shaver for shaving  - Use soft bristle tooth brush  - Limit straining and forceful nose blowing  Outcome: Progressing

## 2024-02-08 NOTE — SPIRITUAL CARE NOTE
Spiritual Care Visit Note    Patient Name: Edward Heard Date of Spiritual Care Visit: 24   : 1934 Primary Dx: Atrial fibrillation, new onset (HCC)       Referred By: Referral From:     Spiritual Care Taxonomy:    Intended Effects: Demonstrate caring and concern    Methods: Offer support;Encourage sharing of feelings;Encourage story-telling;Explore spiritual/Rastafarian beliefs    Interventions: Acknowledge current situation;Active listening;Ask guided questions;Ask guided questions about fuad;Ask questions to bring forth feelings;Discuss plan of care;Explain  role;Identify supportive relationship(s);Invite someone to reminisce;Provide hospitality    Visit Type/Summary:     - Spiritual Care: Offered empathic listening and emotional support. Patient and family expressed appreciation for  visit.  remains available as needed for follow up.    Spiritual Care support can be requested via an Epic consult. For urgent/immediate needs, please contact the On Call  at: [place]: ext [phone]    Rev. Lucius Baca      yes

## 2024-02-08 NOTE — PLAN OF CARE
Patient is A&Ox4, room air, stand by assist. Pt C/O of ear/neck/jaw pain PRN med's given and  aware. Pt also hypotensive asymptomatic this evening  aware no new orders. Plan Call light within reach and fall precautions in place.     Problem: Patient Centered Care  Goal: Patient preferences are identified and integrated in the patient's plan of care  Description: Interventions:  - What would you like us to know as we care for you? I live at an independent living facility with my wife.   - Provide timely, complete, and accurate information to patient/family  - Incorporate patient and family knowledge, values, beliefs, and cultural backgrounds into the planning and delivery of care  - Encourage patient/family to participate in care and decision-making at the level they choose  - Honor patient and family perspectives and choices  Outcome: Progressing     Problem: Patient/Family Goals  Goal: Patient/Family Long Term Goal  Description: Patient's Long Term Goal: to go home    Interventions:  - rate control  -diuretics  - See additional Care Plan goals for specific interventions  Outcome: Progressing  Goal: Patient/Family Short Term Goal  Description: Patient's Short Term Goal: to feel better    Interventions:   - rate control  -diuretics  - See additional Care Plan goals for specific interventions  Outcome: Progressing     Problem: CARDIOVASCULAR - ADULT  Goal: Maintains optimal cardiac output and hemodynamic stability  Description: INTERVENTIONS:  - Monitor vital signs, rhythm, and trends  - Monitor for bleeding, hypotension and signs of decreased cardiac output  - Evaluate effectiveness of vasoactive medications to optimize hemodynamic stability  - Monitor arterial and/or venous puncture sites for bleeding and/or hematoma  - Assess quality of pulses, skin color and temperature  - Assess for signs of decreased coronary artery perfusion - ex. Angina  - Evaluate fluid balance, assess for edema, trend  weights  Outcome: Progressing  Goal: Absence of cardiac arrhythmias or at baseline  Description: INTERVENTIONS:  - Continuous cardiac monitoring, monitor vital signs, obtain 12 lead EKG if indicated  - Evaluate effectiveness of antiarrhythmic and heart rate control medications as ordered  - Initiate emergency measures for life threatening arrhythmias  - Monitor electrolytes and administer replacement therapy as ordered  Outcome: Progressing     Problem: GENITOURINARY - ADULT  Goal: Absence of urinary retention  Description: INTERVENTIONS:  - Assess patient’s ability to void and empty bladder  - Monitor intake/output and perform bladder scan as needed  - Follow urinary retention protocol/standard of care  - Consider collaborating with pharmacy to review patient's medication profile  - Implement strategies to promote bladder emptying  Outcome: Progressing     Problem: METABOLIC/FLUID AND ELECTROLYTES - ADULT  Goal: Glucose maintained within prescribed range  Description: INTERVENTIONS:  - Monitor Blood Glucose as ordered  - Assess for signs and symptoms of hyperglycemia and hypoglycemia  - Administer ordered medications to maintain glucose within target range  - Assess barriers to adequate nutritional intake and initiate nutrition consult as needed  - Instruct patient on self management of diabetes  Outcome: Progressing  Goal: Electrolytes maintained within normal limits  Description: INTERVENTIONS:  - Monitor labs and rhythm and assess patient for signs and symptoms of electrolyte imbalances  - Administer electrolyte replacement as ordered  - Monitor response to electrolyte replacements, including rhythm and repeat lab results as appropriate  - Fluid restriction as ordered  - Instruct patient on fluid and nutrition restrictions as appropriate  Outcome: Progressing  Goal: Hemodynamic stability and optimal renal function maintained  Description: INTERVENTIONS:  - Monitor labs and assess for signs and symptoms of volume  excess or deficit  - Monitor intake, output and patient weight  - Monitor urine specific gravity, serum osmolarity and serum sodium as indicated or ordered  - Monitor response to interventions for patient's volume status, including labs, urine output, blood pressure (other measures as available)  - Encourage oral intake as appropriate  - Instruct patient on fluid and nutrition restrictions as appropriate  Outcome: Progressing     Problem: HEMATOLOGIC - ADULT  Goal: Maintains hematologic stability  Description: INTERVENTIONS  - Assess for signs and symptoms of bleeding or hemorrhage  - Monitor labs and vital signs for trends  - Administer supportive blood products/factors, fluids and medications as ordered and appropriate  - Administer supportive blood products/factors as ordered and appropriate  Outcome: Progressing  Goal: Free from bleeding injury  Description: (Example usage: patient with low platelets)  INTERVENTIONS:  - Avoid intramuscular injections, enemas and rectal medication administration  - Ensure safe mobilization of patient  - Hold pressure on venipuncture sites to achieve adequate hemostasis  - Assess for signs and symptoms of internal bleeding  - Monitor lab trends  - Patient is to report abnormal signs of bleeding to staff  - Avoid use of toothpicks and dental floss  - Use electric shaver for shaving  - Use soft bristle tooth brush  - Limit straining and forceful nose blowing  Outcome: Progressing

## 2024-02-08 NOTE — CM/SW NOTE
Pt is from [place]/spouse. Independent w/walker at baseline. PT recommending HH w/PT at [place]. HH referrals sent in BERTO F entered. List of accepting agencies will be needed for choice.    CM requested that MD review POLST w/patient and sign once pt's ultimate wishes are known. CM notified that copy of POLST is located in patient's chart.    1555: List of accepting HH agencies provided to patient's daughter for choice. [facility] is chosen agency at [place]. Agency reserved in roderick THOMSON also notified to contact daughter to coordinate services. POLST for completed and signed by MD. Copy of POLST sent to registration to be added to patient's medical record.    Plan: [facility] w/spouse with [facility] pending medical clearance.    AMBER VelazquezN    [phone]

## 2024-02-08 NOTE — PLAN OF CARE
Patient and family updated on plan of care by MD and RN. CT of abdomen and pelvis with contrast done this afternoon. Plans for FELI and cardioversion tomorrow. Cardiology will plan on PCI procedure outpatient. All family updated on this plan and patient agreeable.     Problem: Patient Centered Care  Goal: Patient preferences are identified and integrated in the patient's plan of care  Description: Interventions:  - What would you like us to know as we care for you? I live at an independent living facility with my wife.   - Provide timely, complete, and accurate information to patient/family  - Incorporate patient and family knowledge, values, beliefs, and cultural backgrounds into the planning and delivery of care  - Encourage patient/family to participate in care and decision-making at the level they choose  - Honor patient and family perspectives and choices  Outcome: Progressing     Problem: Patient/Family Goals  Goal: Patient/Family Long Term Goal  Description: Patient's Long Term Goal: to go home    Interventions:  - rate control  -diuretics  - See additional Care Plan goals for specific interventions  Outcome: Progressing  Goal: Patient/Family Short Term Goal  Description: Patient's Short Term Goal: to feel better    Interventions:   - rate control  -diuretics  - See additional Care Plan goals for specific interventions  Outcome: Progressing     Problem: CARDIOVASCULAR - ADULT  Goal: Maintains optimal cardiac output and hemodynamic stability  Description: INTERVENTIONS:  - Monitor vital signs, rhythm, and trends  - Monitor for bleeding, hypotension and signs of decreased cardiac output  - Evaluate effectiveness of vasoactive medications to optimize hemodynamic stability  - Monitor arterial and/or venous puncture sites for bleeding and/or hematoma  - Assess quality of pulses, skin color and temperature  - Assess for signs of decreased coronary artery perfusion - ex. Angina  - Evaluate fluid balance, assess for  edema, trend weights  Outcome: Progressing  Goal: Absence of cardiac arrhythmias or at baseline  Description: INTERVENTIONS:  - Continuous cardiac monitoring, monitor vital signs, obtain 12 lead EKG if indicated  - Evaluate effectiveness of antiarrhythmic and heart rate control medications as ordered  - Initiate emergency measures for life threatening arrhythmias  - Monitor electrolytes and administer replacement therapy as ordered  Outcome: Progressing     Problem: GENITOURINARY - ADULT  Goal: Absence of urinary retention  Description: INTERVENTIONS:  - Assess patient’s ability to void and empty bladder  - Monitor intake/output and perform bladder scan as needed  - Follow urinary retention protocol/standard of care  - Consider collaborating with pharmacy to review patient's medication profile  - Implement strategies to promote bladder emptying  Outcome: Progressing     Problem: METABOLIC/FLUID AND ELECTROLYTES - ADULT  Goal: Glucose maintained within prescribed range  Description: INTERVENTIONS:  - Monitor Blood Glucose as ordered  - Assess for signs and symptoms of hyperglycemia and hypoglycemia  - Administer ordered medications to maintain glucose within target range  - Assess barriers to adequate nutritional intake and initiate nutrition consult as needed  - Instruct patient on self management of diabetes  Outcome: Progressing  Goal: Electrolytes maintained within normal limits  Description: INTERVENTIONS:  - Monitor labs and rhythm and assess patient for signs and symptoms of electrolyte imbalances  - Administer electrolyte replacement as ordered  - Monitor response to electrolyte replacements, including rhythm and repeat lab results as appropriate  - Fluid restriction as ordered  - Instruct patient on fluid and nutrition restrictions as appropriate  Outcome: Progressing  Goal: Hemodynamic stability and optimal renal function maintained  Description: INTERVENTIONS:  - Monitor labs and assess for signs and  symptoms of volume excess or deficit  - Monitor intake, output and patient weight  - Monitor urine specific gravity, serum osmolarity and serum sodium as indicated or ordered  - Monitor response to interventions for patient's volume status, including labs, urine output, blood pressure (other measures as available)  - Encourage oral intake as appropriate  - Instruct patient on fluid and nutrition restrictions as appropriate  Outcome: Progressing     Problem: HEMATOLOGIC - ADULT  Goal: Maintains hematologic stability  Description: INTERVENTIONS  - Assess for signs and symptoms of bleeding or hemorrhage  - Monitor labs and vital signs for trends  - Administer supportive blood products/factors, fluids and medications as ordered and appropriate  - Administer supportive blood products/factors as ordered and appropriate  Outcome: Progressing  Goal: Free from bleeding injury  Description: (Example usage: patient with low platelets)  INTERVENTIONS:  - Avoid intramuscular injections, enemas and rectal medication administration  - Ensure safe mobilization of patient  - Hold pressure on venipuncture sites to achieve adequate hemostasis  - Assess for signs and symptoms of internal bleeding  - Monitor lab trends  - Patient is to report abnormal signs of bleeding to staff  - Avoid use of toothpicks and dental floss  - Use electric shaver for shaving  - Use soft bristle tooth brush  - Limit straining and forceful nose blowing  Outcome: Progressing

## 2024-02-08 NOTE — DISCHARGE INSTRUCTIONS
Sometimes managing your health at home requires assistance.  The [facility] team has recognized your preference to use [facility].  They can be reached by phone at [phone].  The fax number for your reference is [phone].  A representative from the home health agency will contact you or your family to schedule your first visit.      No strenuous activity until cleared by the cardiologist

## 2024-02-09 ENCOUNTER — APPOINTMENT (OUTPATIENT)
Dept: INTERVENTIONAL RADIOLOGY/VASCULAR | Facility: HOSPITAL | Age: 89
End: 2024-02-09
Attending: INTERNAL MEDICINE
Payer: MEDICARE

## 2024-02-09 ENCOUNTER — APPOINTMENT (OUTPATIENT)
Dept: PICC SERVICES | Facility: HOSPITAL | Age: 89
End: 2024-02-09
Attending: INTERNAL MEDICINE
Payer: MEDICARE

## 2024-02-09 ENCOUNTER — APPOINTMENT (OUTPATIENT)
Dept: GENERAL RADIOLOGY | Facility: HOSPITAL | Age: 89
End: 2024-02-09
Attending: INTERNAL MEDICINE
Payer: MEDICARE

## 2024-02-09 ENCOUNTER — APPOINTMENT (OUTPATIENT)
Dept: CV DIAGNOSTICS | Facility: HOSPITAL | Age: 89
End: 2024-02-09
Attending: INTERNAL MEDICINE
Payer: MEDICARE

## 2024-02-09 LAB
ATRIAL RATE: 91 BPM
GLUCOSE BLDC GLUCOMTR-MCNC: 133 MG/DL (ref 70–99)
GLUCOSE BLDC GLUCOMTR-MCNC: 134 MG/DL (ref 70–99)
GLUCOSE BLDC GLUCOMTR-MCNC: 230 MG/DL (ref 70–99)
GLUCOSE BLDC GLUCOMTR-MCNC: 257 MG/DL (ref 70–99)
P AXIS: 81 DEGREES
P-R INTERVAL: 234 MS
Q-T INTERVAL: 342 MS
QRS DURATION: 94 MS
QTC CALCULATION (BEZET): 420 MS
R AXIS: 23 DEGREES
T AXIS: 89 DEGREES
VENTRICULAR RATE: 91 BPM

## 2024-02-09 PROCEDURE — 99152 MOD SED SAME PHYS/QHP 5/>YRS: CPT | Performed by: INTERNAL MEDICINE

## 2024-02-09 PROCEDURE — 82962 GLUCOSE BLOOD TEST: CPT

## 2024-02-09 PROCEDURE — 97530 THERAPEUTIC ACTIVITIES: CPT

## 2024-02-09 PROCEDURE — 5A2204Z RESTORATION OF CARDIAC RHYTHM, SINGLE: ICD-10-PCS | Performed by: INTERNAL MEDICINE

## 2024-02-09 PROCEDURE — 93005 ELECTROCARDIOGRAM TRACING: CPT

## 2024-02-09 PROCEDURE — 93312 ECHO TRANSESOPHAGEAL: CPT | Performed by: INTERNAL MEDICINE

## 2024-02-09 PROCEDURE — 93010 ELECTROCARDIOGRAM REPORT: CPT | Performed by: INTERNAL MEDICINE

## 2024-02-09 PROCEDURE — 72040 X-RAY EXAM NECK SPINE 2-3 VW: CPT | Performed by: INTERNAL MEDICINE

## 2024-02-09 PROCEDURE — 93325 DOPPLER ECHO COLOR FLOW MAPG: CPT | Performed by: INTERNAL MEDICINE

## 2024-02-09 PROCEDURE — B24BZZ4 ULTRASONOGRAPHY OF HEART WITH AORTA, TRANSESOPHAGEAL: ICD-10-PCS | Performed by: INTERNAL MEDICINE

## 2024-02-09 PROCEDURE — 99153 MOD SED SAME PHYS/QHP EA: CPT | Performed by: INTERNAL MEDICINE

## 2024-02-09 PROCEDURE — 93320 DOPPLER ECHO COMPLETE: CPT | Performed by: INTERNAL MEDICINE

## 2024-02-09 PROCEDURE — 92960 CARDIOVERSION ELECTRIC EXT: CPT | Performed by: INTERNAL MEDICINE

## 2024-02-09 RX ORDER — METHOHEXITAL IN WATER/PF 100MG/10ML
SYRINGE (ML) INTRAVENOUS
Status: COMPLETED
Start: 2024-02-09 | End: 2024-02-09

## 2024-02-09 RX ORDER — METOCLOPRAMIDE HYDROCHLORIDE 5 MG/ML
10 INJECTION INTRAMUSCULAR; INTRAVENOUS EVERY 8 HOURS PRN
Status: DISCONTINUED | OUTPATIENT
Start: 2024-02-09 | End: 2024-02-10

## 2024-02-09 RX ORDER — MIDAZOLAM HYDROCHLORIDE 1 MG/ML
INJECTION INTRAMUSCULAR; INTRAVENOUS
Status: COMPLETED
Start: 2024-02-09 | End: 2024-02-09

## 2024-02-09 RX ORDER — METHOHEXITAL IN WATER/PF 100MG/10ML
10 SYRINGE (ML) INTRAVENOUS ONCE
Status: COMPLETED | OUTPATIENT
Start: 2024-02-09 | End: 2024-02-09

## 2024-02-09 RX ORDER — MIDAZOLAM HYDROCHLORIDE 1 MG/ML
2 INJECTION INTRAMUSCULAR; INTRAVENOUS ONCE
Status: COMPLETED | OUTPATIENT
Start: 2024-02-09 | End: 2024-02-09

## 2024-02-09 RX ORDER — ATORVASTATIN CALCIUM 40 MG/1
40 TABLET, FILM COATED ORAL DAILY
Status: DISCONTINUED | OUTPATIENT
Start: 2024-02-10 | End: 2024-02-10

## 2024-02-09 NOTE — PROGRESS NOTES
[facility] Pharmacy Note:  Renal Dose Adjustment for Metoclopramide (REGLAN)    Edward Heard has been prescribed Metoclopramide (REGLAN) 5 mg every 8 hours as needed for nausea/vomiting,.    Estimated Creatinine Clearance: 42.6 mL/min (based on SCr of 1.17 mg/dL).    Calculated creatinine clearance is > 40 ml/min, therefore, the dose of Metoclopramide (REGLAN) has been changed to 10 mg every 8 hours as needed for nausea/vomiting per P&T approved protocol.  Pharmacy will continue to follow, and if renal function improves, will resume the original order.       Thank you,  Missy Lopez, PharmD  2/9/2024 11:17 AM

## 2024-02-09 NOTE — PLAN OF CARE
Patient is A&Ox4, room air, 1 assist with walker. Pt had FELI and cardioversion today back to NSR. Amiodarone gtt started today, extended IV placed in R arm. Pt c/o of neck pain PRN meds given. Pt went down for Xray of neck. Call light within reach and fall precautions in place.     Problem: Patient Centered Care  Goal: Patient preferences are identified and integrated in the patient's plan of care  Description: Interventions:  - What would you like us to know as we care for you? I live at an independent living facility with my wife.   - Provide timely, complete, and accurate information to patient/family  - Incorporate patient and family knowledge, values, beliefs, and cultural backgrounds into the planning and delivery of care  - Encourage patient/family to participate in care and decision-making at the level they choose  - Honor patient and family perspectives and choices  Outcome: Progressing     Problem: Patient/Family Goals  Goal: Patient/Family Long Term Goal  Description: Patient's Long Term Goal: to go home    Interventions:  - rate control  -diuretics  - See additional Care Plan goals for specific interventions  Outcome: Progressing  Goal: Patient/Family Short Term Goal  Description: Patient's Short Term Goal: to feel better    Interventions:   - rate control  -diuretics  - See additional Care Plan goals for specific interventions  Outcome: Progressing     Problem: CARDIOVASCULAR - ADULT  Goal: Maintains optimal cardiac output and hemodynamic stability  Description: INTERVENTIONS:  - Monitor vital signs, rhythm, and trends  - Monitor for bleeding, hypotension and signs of decreased cardiac output  - Evaluate effectiveness of vasoactive medications to optimize hemodynamic stability  - Monitor arterial and/or venous puncture sites for bleeding and/or hematoma  - Assess quality of pulses, skin color and temperature  - Assess for signs of decreased coronary artery perfusion - ex. Angina  - Evaluate fluid  balance, assess for edema, trend weights  Outcome: Progressing  Goal: Absence of cardiac arrhythmias or at baseline  Description: INTERVENTIONS:  - Continuous cardiac monitoring, monitor vital signs, obtain 12 lead EKG if indicated  - Evaluate effectiveness of antiarrhythmic and heart rate control medications as ordered  - Initiate emergency measures for life threatening arrhythmias  - Monitor electrolytes and administer replacement therapy as ordered  Outcome: Progressing     Problem: GENITOURINARY - ADULT  Goal: Absence of urinary retention  Description: INTERVENTIONS:  - Assess patient’s ability to void and empty bladder  - Monitor intake/output and perform bladder scan as needed  - Follow urinary retention protocol/standard of care  - Consider collaborating with pharmacy to review patient's medication profile  - Implement strategies to promote bladder emptying  Outcome: Progressing     Problem: METABOLIC/FLUID AND ELECTROLYTES - ADULT  Goal: Glucose maintained within prescribed range  Description: INTERVENTIONS:  - Monitor Blood Glucose as ordered  - Assess for signs and symptoms of hyperglycemia and hypoglycemia  - Administer ordered medications to maintain glucose within target range  - Assess barriers to adequate nutritional intake and initiate nutrition consult as needed  - Instruct patient on self management of diabetes  Outcome: Progressing  Goal: Electrolytes maintained within normal limits  Description: INTERVENTIONS:  - Monitor labs and rhythm and assess patient for signs and symptoms of electrolyte imbalances  - Administer electrolyte replacement as ordered  - Monitor response to electrolyte replacements, including rhythm and repeat lab results as appropriate  - Fluid restriction as ordered  - Instruct patient on fluid and nutrition restrictions as appropriate  Outcome: Progressing  Goal: Hemodynamic stability and optimal renal function maintained  Description: INTERVENTIONS:  - Monitor labs and assess  for signs and symptoms of volume excess or deficit  - Monitor intake, output and patient weight  - Monitor urine specific gravity, serum osmolarity and serum sodium as indicated or ordered  - Monitor response to interventions for patient's volume status, including labs, urine output, blood pressure (other measures as available)  - Encourage oral intake as appropriate  - Instruct patient on fluid and nutrition restrictions as appropriate  Outcome: Progressing

## 2024-02-09 NOTE — PLAN OF CARE
Patient affirms pain and discomfort to the ear/neck/jaw.PRN tylenol given. Standby assist. safety measures in place.call light within reach.plan NPO possible FELI and cardioversion 2/9/24  Problem: Patient Centered Care  Goal: Patient preferences are identified and integrated in the patient's plan of care  Description: Interventions:  - What would you like us to know as we care for you? I live at an independent living facility with my wife.   - Provide timely, complete, and accurate information to patient/family  - Incorporate patient and family knowledge, values, beliefs, and cultural backgrounds into the planning and delivery of care  - Encourage patient/family to participate in care and decision-making at the level they choose  - Honor patient and family perspectives and choices  Outcome: Progressing     Problem: Patient/Family Goals  Goal: Patient/Family Long Term Goal  Description: Patient's Long Term Goal: to go home    Interventions:  - rate control  -diuretics  - See additional Care Plan goals for specific interventions  Outcome: Progressing  Goal: Patient/Family Short Term Goal  Description: Patient's Short Term Goal: to feel better    Interventions:   - rate control  -diuretics  - See additional Care Plan goals for specific interventions  Outcome: Progressing     Problem: CARDIOVASCULAR - ADULT  Goal: Maintains optimal cardiac output and hemodynamic stability  Description: INTERVENTIONS:  - Monitor vital signs, rhythm, and trends  - Monitor for bleeding, hypotension and signs of decreased cardiac output  - Evaluate effectiveness of vasoactive medications to optimize hemodynamic stability  - Monitor arterial and/or venous puncture sites for bleeding and/or hematoma  - Assess quality of pulses, skin color and temperature  - Assess for signs of decreased coronary artery perfusion - ex. Angina  - Evaluate fluid balance, assess for edema, trend weights  Outcome: Progressing  Goal: Absence of cardiac arrhythmias  or at baseline  Description: INTERVENTIONS:  - Continuous cardiac monitoring, monitor vital signs, obtain 12 lead EKG if indicated  - Evaluate effectiveness of antiarrhythmic and heart rate control medications as ordered  - Initiate emergency measures for life threatening arrhythmias  - Monitor electrolytes and administer replacement therapy as ordered  Outcome: Progressing     Problem: GENITOURINARY - ADULT  Goal: Absence of urinary retention  Description: INTERVENTIONS:  - Assess patient’s ability to void and empty bladder  - Monitor intake/output and perform bladder scan as needed  - Follow urinary retention protocol/standard of care  - Consider collaborating with pharmacy to review patient's medication profile  - Implement strategies to promote bladder emptying  Outcome: Progressing     Problem: METABOLIC/FLUID AND ELECTROLYTES - ADULT  Goal: Glucose maintained within prescribed range  Description: INTERVENTIONS:  - Monitor Blood Glucose as ordered  - Assess for signs and symptoms of hyperglycemia and hypoglycemia  - Administer ordered medications to maintain glucose within target range  - Assess barriers to adequate nutritional intake and initiate nutrition consult as needed  - Instruct patient on self management of diabetes  Outcome: Progressing  Goal: Electrolytes maintained within normal limits  Description: INTERVENTIONS:  - Monitor labs and rhythm and assess patient for signs and symptoms of electrolyte imbalances  - Administer electrolyte replacement as ordered  - Monitor response to electrolyte replacements, including rhythm and repeat lab results as appropriate  - Fluid restriction as ordered  - Instruct patient on fluid and nutrition restrictions as appropriate  Outcome: Progressing  Goal: Hemodynamic stability and optimal renal function maintained  Description: INTERVENTIONS:  - Monitor labs and assess for signs and symptoms of volume excess or deficit  - Monitor intake, output and patient weight  -  Monitor urine specific gravity, serum osmolarity and serum sodium as indicated or ordered  - Monitor response to interventions for patient's volume status, including labs, urine output, blood pressure (other measures as available)  - Encourage oral intake as appropriate  - Instruct patient on fluid and nutrition restrictions as appropriate  Outcome: Progressing

## 2024-02-09 NOTE — PHYSICAL THERAPY NOTE
PHYSICAL THERAPY TREATMENT NOTE - INPATIENT     Room Number: 330/330-A       Presenting Problem: SOB, cough, atrial fibrillation  Co-Morbidities : Hx CVA    Problem List  Principal Problem:    Atrial fibrillation, new onset (HCC)  Active Problems:    Congestive heart failure, unspecified HF chronicity, unspecified heart failure type (HCC)      PHYSICAL THERAPY ASSESSMENT   Patient demonstrates good  progress this session, goals  remain in progress.    Patient continues to function near baseline with bed mobility, transfers, and gait.  Contributing factors to remaining limitations include decreased functional strength, decreased endurance/aerobic capacity, and medical status.  Next session anticipate patient to progress bed mobility, transfers, and gait.  Physical Therapy will continue to follow patient for duration of hospitalization.    Patient continues to benefit from continued skilled PT services: at discharge to promote functional independence and safety with additional support and return home with home health PT.    PLAN  PT Treatment Plan: Bed mobility;Body mechanics;Coordination;Endurance;Energy conservation;Patient education;Gait training;Strengthening;Transfer training;Balance training  Frequency (Obs): 5x/week    SUBJECTIVE  Pt was agreeable to therapy session.         OBJECTIVE  Precautions: Cardiac;Hard of hearing;Bed/chair alarm    WEIGHT BEARING RESTRICTION                PAIN ASSESSMENT   Ratin  Location: neck  Management Techniques: Activity promotion;Body mechanics;Relaxation;Repositioning    BALANCE  Static Sitting: Good  Dynamic Sitting: Fair +  Static Standing: Fair  Dynamic Standing: Fair    ACTIVITY TOLERANCE                          O2 WALK       AM-PAC '6-Clicks' INPATIENT SHORT FORM - BASIC MOBILITY  How much difficulty does the patient currently have...  Patient Difficulty: Turning over in bed (including adjusting bedclothes, sheets and blankets)?: A Little   Patient Difficulty:  Sitting down on and standing up from a chair with arms (e.g., wheelchair, bedside commode, etc.): A Little   Patient Difficulty: Moving from lying on back to sitting on the side of the bed?: A Little   How much help from another person does the patient currently need...   Help from Another: Moving to and from a bed to a chair (including a wheelchair)?: A Little   Help from Another: Need to walk in hospital room?: A Little   Help from Another: Climbing 3-5 steps with a railing?: A Little     AM-PAC Score:  Raw Score: 18   Approx Degree of Impairment: 46.58%   Standardized Score (AM-PAC Scale): 43.63   CMS Modifier (G-Code): CK    FUNCTIONAL ABILITY STATUS  Functional Mobility/Gait Assessment  Gait Assistance: Contact guard assist  Distance (ft): 250'  Assistive Device: Rolling walker  Pattern: R Foot drag (Narrow TRINIDAD)  Rolling: stand-by assist  Supine to Sit: stand-by assist  Sit to Supine: stand-by assist  Sit to Stand: stand-by assist    Additional information: pt is received in the bathroom with PCT present. Pt is SBA with the RW with sit<>stand transfers and AMB. Pt AMB about 250' with the RW SBA for balance and safety. Pt with decreased tyrone and step length with narrow TRINIDAD but with good balance and safety awareness. Pt with R foot drag while AMB. Pt denied any dizziness and light headedness. Pt returned back to the room and back to the bed per RN. Reported to the RN on the status of the pt.        The patient's Approx Degree of Impairment: 46.58% has been calculated based on documentation in the [facility] '6 clicks' Inpatient Daily Activity Short Form.  Research supports that patients with this level of impairment may benefit from home with [facility].  Final disposition will be made by interdisciplinary medical team.        Patient End of Session: In bed;Needs met;Call light within reach;RN aware of session/findings;All patient questions and concerns addressed;Alarm set    CURRENT GOALS     Goals to be met by:  2/16/24  Patient Goal Patient's self-stated goal is: go home   Goal #1 Patient is able to demonstrate supine - sit EOB @ level: supervision      Goal #1   Current Status  SBA back to supine    Goal #2 Patient is able to demonstrate transfers Sit to/from Stand at assistance level: supervision with  LRAD      Goal #2  Current Status  SBA with the RW   Goal #3 Patient is able to ambulate 100 feet with assist device:  LRAD  at assistance level: supervision   Goal #3   Current Status  250' with the RW SBA   Goal #4 Patient to demonstrate independence with home activity/exercise instructions provided to patient in preparation for discharge.   Goal #4   Current Status  IN PROGRESS             Therapeutic Activity: 23 minutes

## 2024-02-09 NOTE — IVS NOTE
Hand-Off     Procedure hand off report given to Anisa BATISTA.   Pt's vital signs are stable.   NPO status until 1115, EKG completed    Dr. Grigsby and Dr. Vyas  spoke with patient pre procedure.

## 2024-02-09 NOTE — IVS NOTE
Inpatient Throughput Communication:    Called inpatient [facility] and notified of scheduled procedure FELI/Cardioversion on 2/9/24 1000    Verified that appropriate consent is signed: Yes  Appropriate Consent Signed: Yes  Access Site Hair Clipped and skin prepped: Not Applicable  Patient has functional IV site: Yes  Patient received all pre-treatment medications: Not Applicable  Family aware of approximate time of procedure: Yes    Kayla MADRIGAL RN

## 2024-02-09 NOTE — PROGRESS NOTES
[facility] Hospitalist Progress Note     CC: Hospital Follow up    PCP: Donavan Noonan MD       Assessment/Plan:     Principal Problem:    Atrial fibrillation, new onset (HCC)  Active Problems:    Congestive heart failure, unspecified HF chronicity, unspecified heart failure type (HCC)      Mr. Heard is an 89 yo M with PMH of CVA, PVD, HL who presented from [facility] with new onset Afib and acute systolic CHF. S/P cath with severe left main disease, no CV surg candidate and discussion ongoing on possible cath intervention.     1. Acute systolic CHF- EF 15-20%   -Seems nearly euvolemic- lasix 20 mg daily   -GDMT per cards-> started Lisinopril 2.5 daily , metoprolol 50 BID- titration per cards   -planned for PCI to left main per cards as outpatient with impella assist as outpatient after repeat echo as outpatient   -DCCV today, NSR now, monitor overnight   Aim for discharge tomorrow  Changed to eliquis   Discussed with Dr. Grigsby      New onset Afib with RVR  - Rates now controlled S/P DCCV   - cardiology consulted  - Lovenox with plans for oral AC starting in AM   -Metoprolol   -DCCV 2/8/24     OZZIE vs. CKD- resolved, creatine back to baseline   -Continue to trend, watch close      Hx CVA  Changed to eliquis alone   Stopped plavix     PVD/HL  - statin- increased to 40 daily      DM2 A1c 6.6  Would benefit from jardiance from CHF standpoint - defer to cards as outpatient     Neck pain  Will check X ray of neck   Seems MSK related maybe spasm     ACP  - CODE- DNR/full- confirmed with patient  - POA- wife, then daughter  - lives with spouse at independent living     FN:  - IVF: none  - Diet: cardiac      Questions/concerns were discussed with patient and/or family by bedside.      Thank You,  Alex Peña, DO    Hospitalist with [facility]     Subjective:     Feels some neck pain worse with turning head, no fevers or chills, no chest pains, no shortness of breath     OBJECTIVE:    Blood pressure 113/53,  pulse 86, temperature 98 °F (36.7 °C), temperature source Axillary, resp. rate 20, height 6' 1\" (1.854 m), weight 155 lb (70.3 kg), SpO2 93%.    Temp:  [97.7 °F (36.5 °C)-98.9 °F (37.2 °C)] 98 °F (36.7 °C)  Pulse:  [] 86  Resp:  [18-28] 20  BP: ()/(52-82) 113/53  SpO2:  [91 %-100 %] 93 %      Intake/Output:    Intake/Output Summary (Last 24 hours) at 2/9/2024 1230  Last data filed at 2/9/2024 1145  Gross per 24 hour   Intake 240 ml   Output 650 ml   Net -410 ml       Last 3 Weights   02/09/24 0554 155 lb (70.3 kg)   02/08/24 0434 155 lb 8 oz (70.5 kg)   02/07/24 0522 157 lb 6.4 oz (71.4 kg)   02/06/24 0400 150 lb 1.6 oz (68.1 kg)   02/05/24 0500 152 lb 8 oz (69.2 kg)   02/04/24 0517 154 lb (69.9 kg)   02/03/24 0519 156 lb (70.8 kg)   02/02/24 0450 156 lb 9.6 oz (71 kg)   02/01/24 1341 161 lb 12.8 oz (73.4 kg)   02/01/24 1200 161 lb 12.8 oz (73.4 kg)   02/01/24 1059 160 lb (72.6 kg)   10/18/21 0813 168 lb (76.2 kg)   07/02/21 0827 167 lb 9.6 oz (76 kg)       Exam   Gen: No acute distress, alert and oriented x3  Heent: NC AT, neck supple  Pulm: Lungs clear, normal respiratory effort  CV: Irregularly irregular , no murmurs   Abd: Abdomen soft, nontender, nondistended, bowel sounds present  Skin: no rashes or lesions  Neuro: AO*3, motor intact, no sensory deficits        Data Review:       Labs:     Recent Labs   Lab 02/04/24  0642 02/05/24  0616 02/06/24  0723   RBC 3.59* 3.27* 3.50*   HGB 12.0* 10.8* 11.6*   HCT 35.1* 31.9* 34.6*   MCV 97.8 97.6 98.9   MCH 33.4 33.0 33.1   MCHC 34.2 33.9 33.5   RDW 12.7 12.6 12.5   WBC 11.8* 9.0 10.2   .0 234.0 235.0         Recent Labs   Lab 02/06/24  0723 02/07/24  1455 02/08/24  0658   * 162* 120*   BUN 27* 22 20   CREATSERUM 1.20 1.26 1.17   EGFRCR 58* 55* 60   CA 9.0 9.5 8.9    141 140   K 3.9 5.5* 3.8    109 109   CO2 25.0 26.0 24.0       No results for input(s): \"ALT\", \"AST\", \"ALB\", \"AMYLASE\", \"LIPASE\", \"LDH\" in the last 168  hours.    Invalid input(s): \"ALPHOS\", \"TBIL\", \"DBIL\", \"TPROT\"        Imaging:  CTA ABDOMEN/PELVIS LOWER EXT BILAT W RUNOFF ([id number])    Result Date: 2/7/2024  CONCLUSION:  1. Right Lower Extremity: Minimal atherosclerosis of the iliac arteries without hemodynamically significant stenosis or occlusion; mild atheromatous plaque of the distal common femoral artery contribute to mild luminal narrowing; scattered atherosclerosis of the right superficial femoral artery with minimal luminal narrowing; there is 2-vessel runoff to the level of the ankle via the anterior tibial and peroneal arteries.  2. 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  3. Bilateral pleural effusions and associated basilar atelectasis, with or without superimposed pneumonia.  4. Uncomplicated distal colonic diverticulosis.   5. Prostatomegaly with chronic outlet obstruction.  6. Low-density appearance of the intracardiac blood pool raises the possibility of underlying anemia. Correlate with hematologic parameters.  7. Lesser incidental findings as above.    Dictated by (CST): Vick Coffey MD on 2/07/2024 at 8:02 PM     Finalized by (CST): Vick Coffey MD on 2/07/2024 at 8:20 PM             Meds:      amiodarone  150 mg Intravenous Once    [START ON 2/10/2024] apixaban  5 mg Oral BID    lisinopril  2.5 mg Oral Daily    furosemide  20 mg Oral Daily    metoprolol tartrate  50 mg Oral 2x Daily(Beta Blocker)    enoxaparin  1 mg/kg Subcutaneous q12h    insulin aspart  1-5 Units Subcutaneous TID CC    atorvastatin  20 mg Oral Daily      amiodarone      Followed by    amiodarone        metoclopramide, cyclobenzaprine, ipratropium-albuterol, glucose **OR** glucose **OR** glucose-vitamin C **OR** dextrose **OR** glucose **OR** glucose **OR** glucose-vitamin C, acetaminophen, ondansetron

## 2024-02-09 NOTE — CDS QUERY
.CLINICAL DOCUMENTATION CLARIFICATION FORM    Dear Doctor: Long    Documentation in the record includes OZZIE vs CKD. Based on your judgment and clinical information provided below, please select most appropirate diagnosis:      PLEASE (X) ALL DIAGNOSES THAT APPLY TO:    Clarification # 1     (  x) Acute kidney injury on Chronic Kidney Disease  ( ) Acute kidney injury   ( ) Chronic Kidney Disease   ( ) Other (please specify) : ________________________________    Clarification # 2  If Chronic Kidney Disease , please clarify stage of CKD  ( ) Stage I    ( ) Stage II  ( x) Stage III  ( ) stage IV  ( ) other - please specify:________________________________________    Documentation includes   Risk factors: 89yr old with  HTN Afib with RVR, acute CHF with EF 15%, ischemic cardiomyopathy with multivessel CAD including critical left main stenosis,     Clinical indicators: Fluid over load,   GFR 2/1- /28 56 48 46 43 45  41 58 55  Creatinine 2/1 -2/4 1.24 1.41 1.44 1.55 1.44 1.48     Creatinine 2/5-2/9 1.61 1.40 1.20 1.26 1.17  (baseline 1.2 on admission)     Treatment:  IV lasix, monitored renal function, on 2/5 Hold IV diuresis due increase creatinine,500ml fluid,  2/4-2/6 hold on to start GDMT pending improvement in renal function and blood pressure    Progress notes: OZZIE vs. CKD    If you have any questions, please contact Clinical :  LUIS Maradiaga at      Thank You!    THIS FORM IS A PERMANENT PART OF THE MEDICAL RECORD

## 2024-02-09 NOTE — PROCEDURES
[facility] Cardiology  Transesophageal Echocardiogram Report    (S): Mau Grigsby MD; Darryl Vyas MD    PREOPERATIVE DIAGNOSIS:   Atrial fib    POSTOPERATIVE DIAGNOSIS:  Atrial fib    PROCEDURE PERFORMED: Transesophageal echocardiogram with Doppler    SEDATION: 2 mg Versed, 25 mcg Fentanyl     TECHNIQUE: The risks, benefits, and alternatives to transesophageal echocardiography were discussed with the patient.  The risks included, but were not limited to: dental trauma, respiratory failure, esophageal perforation and death.  The benefits of the procedure included: identification of a cardiac source of embolus, evaluation of valvular disease, evaluation of aortic pathology, and identification of endocarditis.  Alternatives to the procedure included: not performing a transesophageal echocardiogram, treatment with medications only and observation. he verbalized understanding and agreed to proceed with the procedure.     Benzocaine spray was applied to the oropharynx for local anesthesia. The patient was placed in the left lateral decubitus position.  A bite block was placed. Intravenous sedation was administered, with the patient monitored by continuous pulse oximetry and cardiac telemetry.    The IV was maintained by the RN and moderate conscious sedation with Versed and Fentanyl was given. The patient was assessed by nurse and myself during the exam 0958 (time of 1st dose administered when I was present) to 1030 (procedure end time).  The RN served as an independent trained observer was present and assisted in the monitoring of the patient's level of consciousness and physiological status, watching the heart rate, blood pressure, oximetry, and rhythm, in addition to total moderation time.     Once adequate sedation was achieved, a lubricated transesophageal echocardiography probe was inserted orally.  It was smoothly advanced to the upper esophageal and mid esophageal positions.  Imaging was  obtained.  It was then advanced to the deep gastric position.  Further imaging was obtained.  It was then slowly withdrawn orally.  No complications were noted at the end of the procedure.    FINDINGS:     2D echocardiography:  -- Severely reduced LV function  -- No evidence of thrombus in the left atrial appendage     CONCLUSIONS:    1. No evidence of intracardiac masses, thrombi or vegetations.   2. Proceed to DCCV.

## 2024-02-09 NOTE — HOME CARE LIAISON
Referral received from SHALA/ANTHONY team via [facility]. Discussed with patient's dtr Yumiko the recommendation for home health services, dtr agreeable, and all questions answered. Updated SHALA Felipe.

## 2024-02-09 NOTE — PROGRESS NOTES
[facility]    Cardiology Progress Note    Edward Heard Patient Status:  Inpatient    1934 MRN [id number]   Location [facility] 3W/SW Attending Aaliyah Alva MD   Hosp Day # 8 PCP Donavan Noonan MD     Briefly, patient is a 89 yo man with HDL, hx TIA, PVD presenting with CHF and in afib wit RVR. Initially received IV lasix and dilt IV for HR control. He had TTE, which showed severely depressed LVEF to 15-20% with global hypokinesis. He underwent L/RHC once euvolemic, which showed severe proximal LM disease. RHC showed normal cardiac index with euvolemia. FELI/DCCV was done today with plans to convert to NSR and to place patient on GDMT and follow up outpatient to repeat TTE for EF improvement and discuss high risk re-vascularization.    Impression:  Atrial fibrillation with RVR  - s/p FELI/DCCV today with successful conversion to NSR  Congestive heart failure 2/2 cardiomyopathy (LVEF 10-15), ddx includes ischemic and tachycardia-mediated from afib with RVR. Likely multifactorial to some degree.  - Euvolemic on exam  - Cath showing severe proximal LM disease, severe disease of small circumflex, mild mid RCA disease.  - RHC with PCWP 12 mmHg, LVEDP 6-10 mmHg, CI 2.4 L/min/m2   - Patient not a surgical candidate. Has been seen by CT surgery.  - Plan to start on GDMT, cardiovert, and re-evaluate LV function as an outpatient. Will proceed with Impella-assisted PCI as an outpatient.  HDL  TIA  PVD  CAD with severe, calcified proximal left main disease  - Patient not a surgical candidate. Patient and family would like to proceed with high risk PCI of the proximal LM.  - Given high risk nature of the case, will proceed with pre-planning and optimizing patient for PCI.     Recommendations:  Lasix PO 20mg daily. He is currently euvolemic, and Cr is at baseline.  GDMT: metoprolol 50mg BID. Will convert to succinate 100mg on discharge.  Start lisinopril 2.5mg daily  Atorvastatin 40mg  daily  CTA A/P with run off to assess PAD and allow Impella pre-planning completed 2/7/24  FELI/cardioversion done today  Will load with amio and continue IV today. Convert to 200mg daily on discharge.  Continue AC (lovenox) and can convert to Eliquis 5 mg BID  Will have patient seen in cardiology clinic, repeat TTE in 4-6 weeks, and plan for PCI as outpatient.       Subjective:   Patient doing well. He underwent FELI/DCCV today with successful conversion to NSR, with rates in the 90's.      Patient Active Problem List   Diagnosis    CHRONIC AIRWAY OBSTRUCTION    Carotid disease, bilateral (HCC)    Uncontrolled type 2 diabetes mellitus with hyperglycemia, without long-term current use of insulin (HCC)    Bilateral pseudophakia    Arcus senilis of cornea, bilateral    Atrial fibrillation, new onset (HCC)    Congestive heart failure, unspecified HF chronicity, unspecified heart failure type ([facility])       Objective:   Temp: 97.9 °F (36.6 °C)  Pulse: 91  Resp: 18  BP: 108/56    Intake/Output:     Intake/Output Summary (Last 24 hours) at 2/9/2024 1039  Last data filed at 2/9/2024 0559  Gross per 24 hour   Intake 480 ml   Output 750 ml   Net -270 ml       Last 3 Weights   02/09/24 0554 155 lb (70.3 kg)   02/08/24 0434 155 lb 8 oz (70.5 kg)   02/07/24 0522 157 lb 6.4 oz (71.4 kg)   02/06/24 0400 150 lb 1.6 oz (68.1 kg)   02/05/24 0500 152 lb 8 oz (69.2 kg)   02/04/24 0517 154 lb (69.9 kg)   02/03/24 0519 156 lb (70.8 kg)   02/02/24 0450 156 lb 9.6 oz (71 kg)   02/01/24 1341 161 lb 12.8 oz (73.4 kg)   02/01/24 1200 161 lb 12.8 oz (73.4 kg)   02/01/24 1059 160 lb (72.6 kg)   10/18/21 0813 168 lb (76.2 kg)   07/02/21 0827 167 lb 9.6 oz (76 kg)       Tele: AF    Physical Exam:    General: awake, alert, oriented x 3, no acute distress  HEENT: at/nc, perrl, eomi  Neck: supple  Cardiac: irregularly irregular, S1, S2 normal, no murmur, rub or gallop.  Lungs: No rhonchi or dullness.  Normal excursions and effort.   Abdomen: Soft,  non-tender, non-distended, normal bowel sounds   Extremities: Without clubbing, cyanosis. . trace pitting edema to ankles bilaterally.  Neurologic: Alert and oriented, normal affect.  Psych: normal mood and affect  Skin: Warm and dry.   Laboratory/Data:    Labs:         Recent Labs   Lab 02/04/24  0642 02/05/24  0616 02/06/24  0723   WBC 11.8* 9.0 10.2   HGB 12.0* 10.8* 11.6*   MCV 97.8 97.6 98.9   .0 234.0 235.0       Recent Labs   Lab 02/03/24  0630 02/04/24  0642 02/05/24  0616 02/05/24  1255 02/06/24  0723 02/07/24  1455 02/08/24  0658    143 142 141 141 141 140   K 4.4 4.2 3.8 5.0 3.9 5.5* 3.8    108 109 109 109 109 109   CO2 27.0 28.0 27.0 26.0 25.0 26.0 24.0   BUN 30* 33* 32* 31* 27* 22 20   CREATSERUM 1.44* 1.48* 1.61* 1.40* 1.20 1.26 1.17   CA 9.3 9.4 8.9 9.2 9.0 9.5 8.9   MG 1.9 2.0 2.0  --  2.1  --   --    * 139* 140* 130* 135* 162* 120*       No results for input(s): \"ALT\", \"AST\", \"ALB\", \"AMYLASE\", \"LIPASE\", \"LDH\" in the last 168 hours.    Invalid input(s): \"ALPHOS\", \"TBIL\", \"DBIL\", \"TPROT\"      No results for input(s): \"TROP\" in the last 168 hours.      ECHO:  1. Study data: Atrial fibrillation   2. Left ventricle: The cavity size was at the upper limits of normal. Wall      thickness was normal. Systolic function was markedly reduced. The      estimated ejection fraction was 15-20%, by visual assessment. Unable to      assess LV diastolic function due to heart rhythm.   3. Right ventricle: Systolic function was mildly reduced.   4. Left atrium: The left atrial volume was mildly to moderately increased.   5. Right atrium: The estimated central venous pressure is 8mm Hg.   6. Mitral valve: There was mild to moderate regurgitation.   Impressions:  No previous study was available for comparison.     Coronary angiogram 2/5/24  Left main: Severe calcified ostial-proximal stenosis of the left main coronary artery.  LAD: Large vessel that wraps the apex. Luminal  irregularities.  Ramus: Large vessel with luminal irregularities.  Left circumflex: Large vessel giving rise to a large OM branch. Severe tubular 80% lesion of the ostial to proximal OM.  RCA: Anterior takeoff. Large caliber vessel that gives rise to the PDA. Luminal irregularities.    Right heart cath 2/5/24  RA 4/4/3  RV 25/1/4  PCWP 16/13/12  PA 23/13/18  Ao 97/36/58  LVEDP 6 mmHg     PA sat 52%     Laurie Cardiac Output 4.7 L/min  Laurie Cardiac Index 2.4 L/min/m2    Allergies:   No Known Allergies    Medications:    Mau Grigsby MD  Interventional Cardiology

## 2024-02-09 NOTE — PROCEDURES
[facility] Cardiology  Cardioversion Report      (S): Mau Grigsby MD; Darryl Vyas MD    PREOPERATIVE DIAGNOSIS:  Atrial fibrillation.    POSTOPERATIVE DIAGNOSIS:  Normal sinus rhythm.    PROCEDURE PERFORMED:  Direct current cardioversion.    The risks, benefits and alternatives to cardioversion were discussed with the patient. The risks included, but are not limited to: lethal arrhythmia, stroke, respiratory failure, shock and death. The benefits of the procedure included symptomatic improvement and improved survival. Alternatives to the procedure included: not performing a cardioversion, treatment with medications only and observation. The patient verbalized understanding, and agreed to proceed with the procedure.     DESCRIPTION OF PROCEDURE:  The risks, benefits and alternatives to cardioversion were discussed with the patient. The risks included, but are not limited to: lethal arrhythmia, stroke, respiratory failure, shock and death. The benefits of the procedure included symptomatic improvement and improved survival. Alternatives to the procedure included: not performing a cardioversion, treatment with medications only and observation. he verbalized understanding, and agreed to proceed with the procedure.     Defibrillator pads were placed on the anterior and posterior aspects of the patient's left chest. Intravenous sedation was administered, with the patient monitored by continuous pulse oximetry and cardiac telemetry. Once adequate sedation was achieved, 300 joules of direct current, synchronized, biphasic energy were applied. The patient was successfully cardioverted to normal sinus rhythm. No complications were noted at the end of the procedure.    CONCLUSION:  Successful cardioversion to normal sinus rhythm.    Mau Grigsby MD  Interventional Cardiology  [place]

## 2024-02-10 VITALS
BODY MASS INDEX: 20.43 KG/M2 | HEART RATE: 78 BPM | DIASTOLIC BLOOD PRESSURE: 64 MMHG | OXYGEN SATURATION: 94 % | WEIGHT: 154.13 LBS | TEMPERATURE: 98 F | HEIGHT: 73 IN | RESPIRATION RATE: 18 BRPM | SYSTOLIC BLOOD PRESSURE: 112 MMHG

## 2024-02-10 LAB
GLUCOSE BLDC GLUCOMTR-MCNC: 148 MG/DL (ref 70–99)
GLUCOSE BLDC GLUCOMTR-MCNC: 194 MG/DL (ref 70–99)

## 2024-02-10 PROCEDURE — 82962 GLUCOSE BLOOD TEST: CPT

## 2024-02-10 RX ORDER — METOPROLOL SUCCINATE 100 MG/1
100 TABLET, EXTENDED RELEASE ORAL EVERY EVENING
Status: DISCONTINUED | OUTPATIENT
Start: 2024-02-10 | End: 2024-02-10

## 2024-02-10 RX ORDER — AMIODARONE HYDROCHLORIDE 200 MG/1
200 TABLET ORAL DAILY
Qty: 90 TABLET | Refills: 3 | Status: SHIPPED | OUTPATIENT
Start: 2024-02-10 | End: 2025-02-04

## 2024-02-10 RX ORDER — FUROSEMIDE 20 MG/1
20 TABLET ORAL DAILY
Qty: 30 TABLET | Refills: 0 | Status: SHIPPED | OUTPATIENT
Start: 2024-02-11

## 2024-02-10 RX ORDER — SPIRONOLACTONE 25 MG/1
25 TABLET ORAL DAILY
Qty: 90 TABLET | Refills: 3 | Status: SHIPPED | OUTPATIENT
Start: 2024-02-11 | End: 2025-02-05

## 2024-02-10 RX ORDER — METOPROLOL SUCCINATE 100 MG/1
100 TABLET, EXTENDED RELEASE ORAL EVERY EVENING
Qty: 90 TABLET | Refills: 3 | Status: SHIPPED | OUTPATIENT
Start: 2024-02-10 | End: 2025-02-04

## 2024-02-10 RX ORDER — SPIRONOLACTONE 25 MG/1
25 TABLET ORAL DAILY
Status: DISCONTINUED | OUTPATIENT
Start: 2024-02-10 | End: 2024-02-10

## 2024-02-10 RX ORDER — ATORVASTATIN CALCIUM 40 MG/1
40 TABLET, FILM COATED ORAL DAILY
Qty: 90 TABLET | Refills: 0 | Status: SHIPPED | OUTPATIENT
Start: 2024-02-11

## 2024-02-10 RX ORDER — LISINOPRIL 2.5 MG/1
2.5 TABLET ORAL DAILY
Qty: 90 TABLET | Refills: 3 | Status: SHIPPED | OUTPATIENT
Start: 2024-02-11 | End: 2025-02-05

## 2024-02-10 RX ORDER — BENZONATATE 100 MG/1
100 CAPSULE ORAL 2 TIMES DAILY
Status: DISCONTINUED | OUTPATIENT
Start: 2024-02-10 | End: 2024-02-10

## 2024-02-10 NOTE — PROGRESS NOTES
[facility]    Cardiology Progress Note    Edward Heard Patient Status:  Inpatient    1934 MRN [id number]   Location [facility] 3W/SW Attending Aaliyah Alva MD   Hosp Day # 9 PCP Donavan oNonan MD     Briefly, patient is a 88 yo man with HDL, hx TIA, PVD presenting with CHF and in afib wit RVR. Initially received IV lasix and dilt IV for HR control. He had TTE, which showed severely depressed LVEF to 15-20% with global hypokinesis. He underwent L/RHC once euvolemic, which showed severe proximal LM disease. RHC showed normal cardiac index with euvolemia. FELI/DCCV was done today with plans to convert to NSR and to place patient on GDMT and follow up outpatient to repeat TTE for EF improvement and discuss high risk re-vascularization.    Impression:  Atrial fibrillation with RVR  - s/p FELI/DCCV yesterday with successful conversion to NSR  Congestive heart failure 2/2 cardiomyopathy (LVEF 10-15), ddx includes ischemic and tachycardia-mediated from afib with RVR. Likely multifactorial to some degree.  - Euvolemic on exam  - Cath showing severe proximal LM disease, severe disease of small circumflex, mild mid RCA disease.  - RHC with PCWP 12 mmHg, LVEDP 6-10 mmHg, CI 2.4 L/min/m2   - Patient not a surgical candidate. Has been seen by CT surgery.  - Plan to start on GDMT, cardiovert, and re-evaluate LV function as an outpatient. Will proceed with Impella-assisted PCI as an outpatient.  HDL  TIA  PVD  CAD with severe, calcified proximal left main disease  - Patient not a surgical candidate. Patient and family would like to proceed with high risk PCI of the proximal LM.  - Given high risk nature of the case, will proceed with pre-planning and optimizing patient for PCI.     Recommendations:  Continue Lasix PO 20mg daily. He is currently euvolemic, and Cr is at baseline.  GDMT: Toprol 100mg daily,  lisinopril 2.5mg daily and start spironolactone 25mg daily today  Atorvastatin 40mg  daily  CTA A/P with run off to assess PAD and allow Impella pre-planning completed 2/7/24  S/p FELI/cardioversion 2/9, remains in sinus rhythm    Continue amio load IV today. Convert to 200mg daily on discharge.  Eliquis 5 mg BID   Will have patient seen in cardiology clinic, with BMP in 1 week, repeat TTE in 4-6 weeks, and plan for PCI as outpatient.       Subjective:   Patient doing well. He underwent FELI/DCCV yesterday with successful conversion to NSR, with rates in the 90's. Breathing better today      Patient Active Problem List   Diagnosis    CHRONIC AIRWAY OBSTRUCTION    Carotid disease, bilateral (HCC)    Uncontrolled type 2 diabetes mellitus with hyperglycemia, without long-term current use of insulin (HCC)    Bilateral pseudophakia    Arcus senilis of cornea, bilateral    Atrial fibrillation, new onset (HCC)    Congestive heart failure, unspecified HF chronicity, unspecified heart failure type (HCC)       Objective:   Temp: 97.8 °F (36.6 °C)  Pulse: 96  Resp: 18  BP: 130/76    Intake/Output:     Intake/Output Summary (Last 24 hours) at 2/10/2024 0801  Last data filed at 2/10/2024 0553  Gross per 24 hour   Intake 280 ml   Output 600 ml   Net -320 ml       Last 3 Weights   02/10/24 0500 154 lb 1.6 oz (69.9 kg)   02/09/24 0554 155 lb (70.3 kg)   02/08/24 0434 155 lb 8 oz (70.5 kg)   02/07/24 0522 157 lb 6.4 oz (71.4 kg)   02/06/24 0400 150 lb 1.6 oz (68.1 kg)   02/05/24 0500 152 lb 8 oz (69.2 kg)   02/04/24 0517 154 lb (69.9 kg)   02/03/24 0519 156 lb (70.8 kg)   02/02/24 0450 156 lb 9.6 oz (71 kg)   02/01/24 1341 161 lb 12.8 oz (73.4 kg)   02/01/24 1200 161 lb 12.8 oz (73.4 kg)   02/01/24 1059 160 lb (72.6 kg)   10/18/21 0813 168 lb (76.2 kg)   07/02/21 0827 167 lb 9.6 oz (76 kg)       Tele: NSR    Physical Exam:    General: awake, alert, oriented x 3, no acute distress  HEENT: at/nc, perrl, eomi  Neck: supple  Cardiac: irregularly irregular, S1, S2 normal, no murmur, rub or gallop.  Lungs: No rhonchi or  dullness.  Normal excursions and effort.   Abdomen: Soft, non-tender, non-distended, normal bowel sounds   Extremities: Without clubbing, cyanosis. . trace pitting edema to ankles bilaterally.  Neurologic: Alert and oriented, normal affect.  Psych: normal mood and affect  Skin: Warm and dry.   Laboratory/Data:    Labs:         Recent Labs   Lab 02/04/24  0642 02/05/24  0616 02/06/24  0723   WBC 11.8* 9.0 10.2   HGB 12.0* 10.8* 11.6*   MCV 97.8 97.6 98.9   .0 234.0 235.0       Recent Labs   Lab 02/04/24  0642 02/05/24  0616 02/05/24  1255 02/06/24  0723 02/07/24  1455 02/08/24  0658    142 141 141 141 140   K 4.2 3.8 5.0 3.9 5.5* 3.8    109 109 109 109 109   CO2 28.0 27.0 26.0 25.0 26.0 24.0   BUN 33* 32* 31* 27* 22 20   CREATSERUM 1.48* 1.61* 1.40* 1.20 1.26 1.17   CA 9.4 8.9 9.2 9.0 9.5 8.9   MG 2.0 2.0  --  2.1  --   --    * 140* 130* 135* 162* 120*       No results for input(s): \"ALT\", \"AST\", \"ALB\", \"AMYLASE\", \"LIPASE\", \"LDH\" in the last 168 hours.    Invalid input(s): \"ALPHOS\", \"TBIL\", \"DBIL\", \"TPROT\"      No results for input(s): \"TROP\" in the last 168 hours.      ECHO:  1. Study data: Atrial fibrillation   2. Left ventricle: The cavity size was at the upper limits of normal. Wall      thickness was normal. Systolic function was markedly reduced. The      estimated ejection fraction was 15-20%, by visual assessment. Unable to      assess LV diastolic function due to heart rhythm.   3. Right ventricle: Systolic function was mildly reduced.   4. Left atrium: The left atrial volume was mildly to moderately increased.   5. Right atrium: The estimated central venous pressure is 8mm Hg.   6. Mitral valve: There was mild to moderate regurgitation.   Impressions:  No previous study was available for comparison.     Coronary angiogram 2/5/24  Left main: Severe calcified ostial-proximal stenosis of the left main coronary artery.  LAD: Large vessel that wraps the apex. Luminal  irregularities.  Ramus: Large vessel with luminal irregularities.  Left circumflex: Large vessel giving rise to a large OM branch. Severe tubular 80% lesion of the ostial to proximal OM.  RCA: Anterior takeoff. Large caliber vessel that gives rise to the PDA. Luminal irregularities.    Right heart cath 2/5/24  RA 4/4/3  RV 25/1/4  PCWP 16/13/12  PA 23/13/18  Ao 97/36/58  LVEDP 6 mmHg     PA sat 52%     Laurie Cardiac Output 4.7 L/min  Laurie Cardiac Index 2.4 L/min/m2    Allergies:   No Known Allergies    Medications:    ADDENDUM:  The patient was personally seen and examined by me. I agree with the note written below.    Continue IV amio load today and switch to PO discharge. Ok to go home today     Lay Mcduffie MD

## 2024-02-10 NOTE — DISCHARGE PLANNING
Patient discharging home with son. Medications and follow up appointments reviewed with patient and son. Both parties verbalized understanding. IV and tele removed. Patient is stable for discharge at this time.

## 2024-02-10 NOTE — CM/SW NOTE
02/10/24 1100   Discharge disposition   Expected discharge disposition Home-Health   Post Acute Care Provider Residential   Home services after discharge None   Discharge transportation Private car     The patient received a MDO for discharge.    The patient will be transported home via private car.    The [facility] is reserved with [facility].    The patient has no questions or concerns at this time.    SW/CM to remain available for support and/or discharge planning.     Mima Boswell MSW, LSW  Discharge Planner [id number]

## 2024-02-10 NOTE — PLAN OF CARE
Patient affirms pain and discomfort to the neck and back of ear, Xray done. Safety measures in place call light within reach, plan Amiodarone drip.  Problem: Patient Centered Care  Goal: Patient preferences are identified and integrated in the patient's plan of care  Description: Interventions:  - What would you like us to know as we care for you? I live at an independent living facility with my wife.   - Provide timely, complete, and accurate information to patient/family  - Incorporate patient and family knowledge, values, beliefs, and cultural backgrounds into the planning and delivery of care  - Encourage patient/family to participate in care and decision-making at the level they choose  - Honor patient and family perspectives and choices  Outcome: Progressing     Problem: Patient/Family Goals  Goal: Patient/Family Long Term Goal  Description: Patient's Long Term Goal: to go home    Interventions:  - rate control  -diuretics  - See additional Care Plan goals for specific interventions  Outcome: Progressing  Goal: Patient/Family Short Term Goal  Description: Patient's Short Term Goal: to feel better    Interventions:   - rate control  -diuretics  - See additional Care Plan goals for specific interventions  Outcome: Progressing     Problem: CARDIOVASCULAR - ADULT  Goal: Maintains optimal cardiac output and hemodynamic stability  Description: INTERVENTIONS:  - Monitor vital signs, rhythm, and trends  - Monitor for bleeding, hypotension and signs of decreased cardiac output  - Evaluate effectiveness of vasoactive medications to optimize hemodynamic stability  - Monitor arterial and/or venous puncture sites for bleeding and/or hematoma  - Assess quality of pulses, skin color and temperature  - Assess for signs of decreased coronary artery perfusion - ex. Angina  - Evaluate fluid balance, assess for edema, trend weights  Outcome: Progressing  Goal: Absence of cardiac arrhythmias or at baseline  Description:  INTERVENTIONS:  - Continuous cardiac monitoring, monitor vital signs, obtain 12 lead EKG if indicated  - Evaluate effectiveness of antiarrhythmic and heart rate control medications as ordered  - Initiate emergency measures for life threatening arrhythmias  - Monitor electrolytes and administer replacement therapy as ordered  Outcome: Progressing     Problem: GENITOURINARY - ADULT  Goal: Absence of urinary retention  Description: INTERVENTIONS:  - Assess patient’s ability to void and empty bladder  - Monitor intake/output and perform bladder scan as needed  - Follow urinary retention protocol/standard of care  - Consider collaborating with pharmacy to review patient's medication profile  - Implement strategies to promote bladder emptying  Outcome: Progressing     Problem: METABOLIC/FLUID AND ELECTROLYTES - ADULT  Goal: Glucose maintained within prescribed range  Description: INTERVENTIONS:  - Monitor Blood Glucose as ordered  - Assess for signs and symptoms of hyperglycemia and hypoglycemia  - Administer ordered medications to maintain glucose within target range  - Assess barriers to adequate nutritional intake and initiate nutrition consult as needed  - Instruct patient on self management of diabetes  Outcome: Progressing  Goal: Electrolytes maintained within normal limits  Description: INTERVENTIONS:  - Monitor labs and rhythm and assess patient for signs and symptoms of electrolyte imbalances  - Administer electrolyte replacement as ordered  - Monitor response to electrolyte replacements, including rhythm and repeat lab results as appropriate  - Fluid restriction as ordered  - Instruct patient on fluid and nutrition restrictions as appropriate  Outcome: Progressing  Goal: Hemodynamic stability and optimal renal function maintained  Description: INTERVENTIONS:  - Monitor labs and assess for signs and symptoms of volume excess or deficit  - Monitor intake, output and patient weight  - Monitor urine specific  gravity, serum osmolarity and serum sodium as indicated or ordered  - Monitor response to interventions for patient's volume status, including labs, urine output, blood pressure (other measures as available)  - Encourage oral intake as appropriate  - Instruct patient on fluid and nutrition restrictions as appropriate  Outcome: Progressing

## 2024-02-10 NOTE — PLAN OF CARE
Problem: Patient Centered Care  Goal: Patient preferences are identified and integrated in the patient's plan of care  Description: Interventions:  - What would you like us to know as we care for you? I live at an independent living facility with my wife.   - Provide timely, complete, and accurate information to patient/family  - Incorporate patient and family knowledge, values, beliefs, and cultural backgrounds into the planning and delivery of care  - Encourage patient/family to participate in care and decision-making at the level they choose  - Honor patient and family perspectives and choices  Outcome: Progressing     Problem: Patient/Family Goals  Goal: Patient/Family Long Term Goal  Description: Patient's Long Term Goal: to go home    Interventions:  - rate control  -diuretics  - See additional Care Plan goals for specific interventions  Outcome: Progressing  Goal: Patient/Family Short Term Goal  Description: Patient's Short Term Goal: to feel better    Interventions:   - rate control  -diuretics  - See additional Care Plan goals for specific interventions  Outcome: Progressing     Problem: CARDIOVASCULAR - ADULT  Goal: Maintains optimal cardiac output and hemodynamic stability  Description: INTERVENTIONS:  - Monitor vital signs, rhythm, and trends  - Monitor for bleeding, hypotension and signs of decreased cardiac output  - Evaluate effectiveness of vasoactive medications to optimize hemodynamic stability  - Monitor arterial and/or venous puncture sites for bleeding and/or hematoma  - Assess quality of pulses, skin color and temperature  - Assess for signs of decreased coronary artery perfusion - ex. Angina  - Evaluate fluid balance, assess for edema, trend weights  Outcome: Progressing  Goal: Absence of cardiac arrhythmias or at baseline  Description: INTERVENTIONS:  - Continuous cardiac monitoring, monitor vital signs, obtain 12 lead EKG if indicated  - Evaluate effectiveness of antiarrhythmic and heart  rate control medications as ordered  - Initiate emergency measures for life threatening arrhythmias  - Monitor electrolytes and administer replacement therapy as ordered  Outcome: Progressing     Problem: GENITOURINARY - ADULT  Goal: Absence of urinary retention  Description: INTERVENTIONS:  - Assess patient’s ability to void and empty bladder  - Monitor intake/output and perform bladder scan as needed  - Follow urinary retention protocol/standard of care  - Consider collaborating with pharmacy to review patient's medication profile  - Implement strategies to promote bladder emptying  Outcome: Progressing     Problem: METABOLIC/FLUID AND ELECTROLYTES - ADULT  Goal: Glucose maintained within prescribed range  Description: INTERVENTIONS:  - Monitor Blood Glucose as ordered  - Assess for signs and symptoms of hyperglycemia and hypoglycemia  - Administer ordered medications to maintain glucose within target range  - Assess barriers to adequate nutritional intake and initiate nutrition consult as needed  - Instruct patient on self management of diabetes  Outcome: Progressing  Goal: Electrolytes maintained within normal limits  Description: INTERVENTIONS:  - Monitor labs and rhythm and assess patient for signs and symptoms of electrolyte imbalances  - Administer electrolyte replacement as ordered  - Monitor response to electrolyte replacements, including rhythm and repeat lab results as appropriate  - Fluid restriction as ordered  - Instruct patient on fluid and nutrition restrictions as appropriate  Outcome: Progressing  Goal: Hemodynamic stability and optimal renal function maintained  Description: INTERVENTIONS:  - Monitor labs and assess for signs and symptoms of volume excess or deficit  - Monitor intake, output and patient weight  - Monitor urine specific gravity, serum osmolarity and serum sodium as indicated or ordered  - Monitor response to interventions for patient's volume status, including labs, urine output,  blood pressure (other measures as available)  - Encourage oral intake as appropriate  - Instruct patient on fluid and nutrition restrictions as appropriate  Outcome: Progressing     Problem: HEMATOLOGIC - ADULT  Goal: Maintains hematologic stability  Description: INTERVENTIONS  - Assess for signs and symptoms of bleeding or hemorrhage  - Monitor labs and vital signs for trends  - Administer supportive blood products/factors, fluids and medications as ordered and appropriate  - Administer supportive blood products/factors as ordered and appropriate  Outcome: Progressing  Goal: Free from bleeding injury  Description: (Example usage: patient with low platelets)  INTERVENTIONS:  - Avoid intramuscular injections, enemas and rectal medication administration  - Ensure safe mobilization of patient  - Hold pressure on venipuncture sites to achieve adequate hemostasis  - Assess for signs and symptoms of internal bleeding  - Monitor lab trends  - Patient is to report abnormal signs of bleeding to staff  - Avoid use of toothpicks and dental floss  - Use electric shaver for shaving  - Use soft bristle tooth brush  - Limit straining and forceful nose blowing  Outcome: Progressing     Patient is alert and orientated x 4. Patient is on RA. Amiodarone drip stopped per cardiology. Plan for discharge home.

## 2024-02-10 NOTE — DISCHARGE SUMMARY
General Medicine Discharge Summary     Patient ID:  Edward Heard  89 year old  4/6/1934    Admit date: 2/1/2024    Discharge date and time: 2/10/24    Attending Physician: Alex Peña DO     Consults: IP CONSULT TO CARDIOLOGY  IP CONSULT TO SOCIAL WORK  IP CONSULT TO SOCIAL WORK  IP CONSULT TO CARDIOTHORACIC SURGERY  IP CONSULT TO VASCULAR ACCESS TEAM    Primary Care Physician: Donavan Noonan MD     Reason for admission: New a fib     Risk For Readmission: Moderate     Discharge Diagnoses: Atrial fibrillation, new onset (HCC) [I48.91]  Congestive heart failure, unspecified HF chronicity, unspecified heart failure type (HCC) [I50.9]  See Additional Discharge Diagnoses in Hospital Course    Discharged Condition: fair    Follow-up with labs/images appointments: Needs PCP follow-up in 3 days with PCP, unable to coordinate with coordinator, stressed to patient need for close follow-up   Cards follow-up in 1-2 weeks         Exam  Gen: No acute distress, well appearing sitting in chair   Pulm: Lungs clear, normal respiratory effort  CV: Heart with regular rate and rhythm, soft murmur       HPI:   Per Dr. Alva       Mr. Heard is an 89 yo M with PMH of CVA, PVD, HL who presented from [facility] with new onset Afib. Patient has been having shortness of breath x 1 week, went to [facility], found to have new onset Afib with RVR and concern for fluid overload/CHF. States he has gained about 5 lbs and noticed new leg swelling as well. No prior hx of CHF or Afib. Has had a stroke in the past.    In the ED, found to have Afib with RVR, HR 130s. Given IVP diltiazem and started on diltiazem gtt. Also given IV lasix for fluid overload    Hospital Course:     Admitted for SOB and chest pains, found to have severe left main disease on cath and newly depressed EF, and new onset a fib, eventually patient underwent DCCV with return of NSR and improvement of his symptoms . He was started on GDMT per cards and amio as well as AC at discharge.  The plan is for impella assisted left main stent as outpatient with cards. His chest pains and SOB improved with return of NSR. Amio was also started at discharge , statin dose was increased.   Hospital course also complicated by OZZIE which resolved by discharge. See list below for med changes         Operative Procedures:      Imaging: XR CERVICAL SPINE (2-3 VIEWS) (CPT=72040)    Result Date: 2/9/2024  CONCLUSION:   Moderate to advanced multilevel degenerative disease of the cervical spine.    Dictated by (CST): Manisha Fuller MD on 2/09/2024 at 7:15 PM     Finalized by (CST): Manisha Fuller MD on 2/09/2024 at 7:17 PM          CTA ABDOMEN/PELVIS LOWER EXT BILAT W RUNOFF (VLP=[id number])    Result Date: 2/7/2024  CONCLUSION:  1. Right Lower Extremity: Minimal atherosclerosis of the iliac arteries without hemodynamically significant stenosis or occlusion; mild atheromatous plaque of the distal common femoral artery contribute to mild luminal narrowing; scattered atherosclerosis of the right superficial femoral artery with minimal luminal narrowing; there is 2-vessel runoff to the level of the ankle via the anterior tibial and peroneal arteries.  2. 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  3. Bilateral pleural effusions and associated basilar atelectasis, with or without superimposed pneumonia.  4. Uncomplicated distal colonic diverticulosis.   5. Prostatomegaly with chronic outlet obstruction.  6. Low-density appearance of the intracardiac blood pool raises the possibility of underlying anemia. Correlate with hematologic parameters.  7. Lesser  incidental findings as above.    Dictated by (CST): Vick Coffey MD on 2/07/2024 at 8:02 PM     Finalized by (CST): Vick Coffey MD on 2/07/2024 at 8:20 PM           Disposition: home    Activity: No strenuous activity until cleared by cards   Diet: cardiac diet  Wound Care: none needed  Code Status: DNAR/Full Treatment  O2: None    Home Medication Changes: See list below     Med list     Medication List        START taking these medications      amiodarone 200 MG Tabs  Commonly known as: Pacerone  Take 1 tablet (200 mg total) by mouth daily.     apixaban 5 MG Tabs  Commonly known as: Eliquis  Take 1 tablet (5 mg total) by mouth 2 (two) times daily.     furosemide 20 MG Tabs  Commonly known as: Lasix  Take 1 tablet (20 mg total) by mouth daily.  Start taking on: February 11, 2024     lisinopril 2.5 MG Tabs  Commonly known as: Prinivil; Zestril  Take 1 tablet (2.5 mg total) by mouth daily.  Start taking on: February 11, 2024     metoprolol succinate  MG Tb24  Commonly known as: Toprol XL  Take 1 tablet (100 mg total) by mouth every evening.     spironolactone 25 MG Tabs  Commonly known as: Aldactone  Take 1 tablet (25 mg total) by mouth daily.  Start taking on: February 11, 2024            CHANGE how you take these medications      atorvastatin 40 MG Tabs  Commonly known as: Lipitor  Take 1 tablet (40 mg total) by mouth daily.  Start taking on: February 11, 2024  What changed:   medication strength  how much to take            CONTINUE taking these medications      metFORMIN 500 MG Tabs  Commonly known as: Glucophage     MULTIVITAMINS OR            STOP taking these medications      clopidogrel 75 MG Tabs  Commonly known as: Plavix               Where to Get Your Medications        These medications were sent to [facility]/pharmacy #[id number] - [place] - [address] [phone], [phone]  [address] [place] [address]      Phone: [phone]   amiodarone 200 MG Tabs  apixaban 5 MG  Tabs  atorvastatin 40 MG Tabs  furosemide 20 MG Tabs  lisinopril 2.5 MG Tabs  metoprolol succinate  MG Tb24  spironolactone 25 MG Tabs         FU   Follow-up Information       Mau Grigsby MD Follow up in 1 week(s).    Specialty: Interventional, Cardiology  Contact information:  [address]  [address]  [facility] [address]  [phone]               Donavan Noonan MD Follow up in 3 day(s).    Specialties: Internal Medicine, PEDIATRICS  Why: Attempts were made to arrange short term follow-up prior to discharge  Contact information:  [address]  [address]  [place] [id number] [phone]                             DC instructions:      Other Discharge Instructions:         Sometimes managing your health at home requires assistance.  The [facility] team has recognized your preference to use [facility].  They can be reached by phone at [phone].  The fax number for your reference is [phone].  A representative from the home health agency will contact you or your family to schedule your first visit.      No strenuous activity until cleared by the cardiologist         I reconciled current and discharge medications on day of discharge, discussed changes with patient and noted changes above.       Total Time Coordinating Care: 35 minutes    Patient had opportunity to ask questions and state understand and agree with therapeutic plan as outlined    Thank You,    Alex Peña, DO   Hospitalist with [facility]

## 2024-03-25 ENCOUNTER — LAB ENCOUNTER (OUTPATIENT)
Dept: LAB | Facility: HOSPITAL | Age: 89
End: 2024-03-25
Attending: INTERNAL MEDICINE
Payer: MEDICARE

## 2024-03-25 DIAGNOSIS — I50.22 CHRONIC SYSTOLIC HEART FAILURE (HCC): ICD-10-CM

## 2024-03-25 DIAGNOSIS — I42.9 SECONDARY CARDIOMYOPATHY, UNSPECIFIED (HCC): Primary | ICD-10-CM

## 2024-03-25 LAB
ANION GAP SERPL CALC-SCNC: 3 MMOL/L (ref 0–18)
BUN BLD-MCNC: 51 MG/DL (ref 9–23)
BUN/CREAT SERPL: 22.7 (ref 10–20)
CALCIUM BLD-MCNC: 9.5 MG/DL (ref 8.7–10.4)
CHLORIDE SERPL-SCNC: 109 MMOL/L (ref 98–112)
CO2 SERPL-SCNC: 25 MMOL/L (ref 21–32)
CREAT BLD-MCNC: 2.25 MG/DL
EGFRCR SERPLBLD CKD-EPI 2021: 27 ML/MIN/1.73M2 (ref 60–?)
FASTING STATUS PATIENT QL REPORTED: NO
GLUCOSE BLD-MCNC: 269 MG/DL (ref 70–99)
OSMOLALITY SERPL CALC.SUM OF ELEC: 307 MOSM/KG (ref 275–295)
POTASSIUM SERPL-SCNC: 6.3 MMOL/L (ref 3.5–5.1)
SODIUM SERPL-SCNC: 137 MMOL/L (ref 136–145)

## 2024-03-25 PROCEDURE — 80048 BASIC METABOLIC PNL TOTAL CA: CPT

## 2024-03-25 PROCEDURE — 36415 COLL VENOUS BLD VENIPUNCTURE: CPT

## 2024-03-26 ENCOUNTER — HOSPITAL ENCOUNTER (OUTPATIENT)
Facility: HOSPITAL | Age: 89
Setting detail: OBSERVATION
Discharge: HOME HEALTH | End: 2024-03-28
Attending: EMERGENCY MEDICINE | Admitting: INTERNAL MEDICINE
Payer: MEDICARE

## 2024-03-26 DIAGNOSIS — E87.5 HYPERKALEMIA: Primary | ICD-10-CM

## 2024-03-26 LAB
ALBUMIN SERPL-MCNC: 3.8 G/DL (ref 3.2–4.8)
ALBUMIN/GLOB SERPL: 1.5 {RATIO} (ref 1–2)
ALP LIVER SERPL-CCNC: 63 U/L
ALT SERPL-CCNC: 14 U/L
ANION GAP SERPL CALC-SCNC: 5 MMOL/L (ref 0–18)
AST SERPL-CCNC: 16 U/L (ref ?–34)
BASOPHILS # BLD AUTO: 0.05 X10(3) UL (ref 0–0.2)
BASOPHILS NFR BLD AUTO: 0.5 %
BILIRUB SERPL-MCNC: 0.4 MG/DL (ref 0.2–1.1)
BUN BLD-MCNC: 50 MG/DL (ref 9–23)
BUN/CREAT SERPL: 24 (ref 10–20)
CALCIUM BLD-MCNC: 9.3 MG/DL (ref 8.7–10.4)
CHLORIDE SERPL-SCNC: 109 MMOL/L (ref 98–112)
CO2 SERPL-SCNC: 24 MMOL/L (ref 21–32)
CREAT BLD-MCNC: 2.08 MG/DL
DEPRECATED RDW RBC AUTO: 49.4 FL (ref 35.1–46.3)
EGFRCR SERPLBLD CKD-EPI 2021: 30 ML/MIN/1.73M2 (ref 60–?)
EOSINOPHIL # BLD AUTO: 0.12 X10(3) UL (ref 0–0.7)
EOSINOPHIL NFR BLD AUTO: 1.2 %
ERYTHROCYTE [DISTWIDTH] IN BLOOD BY AUTOMATED COUNT: 14.1 % (ref 11–15)
GLOBULIN PLAS-MCNC: 2.5 G/DL (ref 2.8–4.4)
GLUCOSE BLD-MCNC: 208 MG/DL (ref 70–99)
GLUCOSE BLDC GLUCOMTR-MCNC: 157 MG/DL (ref 70–99)
GLUCOSE BLDC GLUCOMTR-MCNC: 184 MG/DL (ref 70–99)
GLUCOSE BLDC GLUCOMTR-MCNC: 227 MG/DL (ref 70–99)
GLUCOSE BLDC GLUCOMTR-MCNC: 91 MG/DL (ref 70–99)
HCT VFR BLD AUTO: 29.2 %
HGB BLD-MCNC: 9.3 G/DL
IMM GRANULOCYTES # BLD AUTO: 0.03 X10(3) UL (ref 0–1)
IMM GRANULOCYTES NFR BLD: 0.3 %
LYMPHOCYTES # BLD AUTO: 1.44 X10(3) UL (ref 1–4)
LYMPHOCYTES NFR BLD AUTO: 14 %
MCH RBC QN AUTO: 31 PG (ref 26–34)
MCHC RBC AUTO-ENTMCNC: 31.8 G/DL (ref 31–37)
MCV RBC AUTO: 97.3 FL
MONOCYTES # BLD AUTO: 0.72 X10(3) UL (ref 0.1–1)
MONOCYTES NFR BLD AUTO: 7 %
NEUTROPHILS # BLD AUTO: 7.9 X10 (3) UL (ref 1.5–7.7)
NEUTROPHILS # BLD AUTO: 7.9 X10(3) UL (ref 1.5–7.7)
NEUTROPHILS NFR BLD AUTO: 77 %
OSMOLALITY SERPL CALC.SUM OF ELEC: 305 MOSM/KG (ref 275–295)
PLATELET # BLD AUTO: 284 10(3)UL (ref 150–450)
POTASSIUM SERPL-SCNC: 5.8 MMOL/L (ref 3.5–5.1)
PROT SERPL-MCNC: 6.3 G/DL (ref 5.7–8.2)
RBC # BLD AUTO: 3 X10(6)UL
SODIUM SERPL-SCNC: 138 MMOL/L (ref 136–145)
TROPONIN I SERPL HS-MCNC: 10 NG/L
WBC # BLD AUTO: 10.3 X10(3) UL (ref 4–11)

## 2024-03-26 PROCEDURE — 93005 ELECTROCARDIOGRAM TRACING: CPT

## 2024-03-26 PROCEDURE — 85025 COMPLETE CBC W/AUTO DIFF WBC: CPT | Performed by: EMERGENCY MEDICINE

## 2024-03-26 PROCEDURE — 96375 TX/PRO/DX INJ NEW DRUG ADDON: CPT

## 2024-03-26 PROCEDURE — 96368 THER/DIAG CONCURRENT INF: CPT

## 2024-03-26 PROCEDURE — 82962 GLUCOSE BLOOD TEST: CPT

## 2024-03-26 PROCEDURE — 96366 THER/PROPH/DIAG IV INF ADDON: CPT

## 2024-03-26 PROCEDURE — 96365 THER/PROPH/DIAG IV INF INIT: CPT

## 2024-03-26 PROCEDURE — 93010 ELECTROCARDIOGRAM REPORT: CPT

## 2024-03-26 PROCEDURE — 84484 ASSAY OF TROPONIN QUANT: CPT | Performed by: EMERGENCY MEDICINE

## 2024-03-26 PROCEDURE — 99285 EMERGENCY DEPT VISIT HI MDM: CPT

## 2024-03-26 PROCEDURE — 80053 COMPREHEN METABOLIC PANEL: CPT | Performed by: EMERGENCY MEDICINE

## 2024-03-26 RX ORDER — ONDANSETRON 2 MG/ML
4 INJECTION INTRAMUSCULAR; INTRAVENOUS EVERY 6 HOURS PRN
Status: DISCONTINUED | OUTPATIENT
Start: 2024-03-26 | End: 2024-03-28

## 2024-03-26 RX ORDER — NICOTINE POLACRILEX 4 MG
15 LOZENGE BUCCAL
Status: DISCONTINUED | OUTPATIENT
Start: 2024-03-26 | End: 2024-03-28

## 2024-03-26 RX ORDER — ACETAMINOPHEN 500 MG
500 TABLET ORAL EVERY 4 HOURS PRN
Status: DISCONTINUED | OUTPATIENT
Start: 2024-03-26 | End: 2024-03-28

## 2024-03-26 RX ORDER — POLYETHYLENE GLYCOL 3350 17 G/17G
17 POWDER, FOR SOLUTION ORAL DAILY PRN
Status: DISCONTINUED | OUTPATIENT
Start: 2024-03-26 | End: 2024-03-28

## 2024-03-26 RX ORDER — METOCLOPRAMIDE HYDROCHLORIDE 5 MG/ML
5 INJECTION INTRAMUSCULAR; INTRAVENOUS EVERY 8 HOURS PRN
Status: DISCONTINUED | OUTPATIENT
Start: 2024-03-26 | End: 2024-03-28

## 2024-03-26 RX ORDER — HEPARIN SODIUM 5000 [USP'U]/ML
5000 INJECTION, SOLUTION INTRAVENOUS; SUBCUTANEOUS EVERY 8 HOURS SCHEDULED
Status: DISCONTINUED | OUTPATIENT
Start: 2024-03-26 | End: 2024-03-26

## 2024-03-26 RX ORDER — LISINOPRIL 2.5 MG/1
2.5 TABLET ORAL DAILY
Status: DISCONTINUED | OUTPATIENT
Start: 2024-03-27 | End: 2024-03-28

## 2024-03-26 RX ORDER — DEXTROSE MONOHYDRATE 25 G/50ML
50 INJECTION, SOLUTION INTRAVENOUS
Status: DISCONTINUED | OUTPATIENT
Start: 2024-03-26 | End: 2024-03-28

## 2024-03-26 RX ORDER — DEXTROSE MONOHYDRATE 100 MG/ML
250 INJECTION, SOLUTION INTRAVENOUS ONCE
Status: COMPLETED | OUTPATIENT
Start: 2024-03-26 | End: 2024-03-26

## 2024-03-26 RX ORDER — NICOTINE POLACRILEX 4 MG
30 LOZENGE BUCCAL
Status: DISCONTINUED | OUTPATIENT
Start: 2024-03-26 | End: 2024-03-28

## 2024-03-26 RX ORDER — BISACODYL 10 MG
10 SUPPOSITORY, RECTAL RECTAL
Status: DISCONTINUED | OUTPATIENT
Start: 2024-03-26 | End: 2024-03-28

## 2024-03-26 RX ORDER — SENNOSIDES 8.6 MG
17.2 TABLET ORAL NIGHTLY PRN
Status: DISCONTINUED | OUTPATIENT
Start: 2024-03-26 | End: 2024-03-28

## 2024-03-26 RX ORDER — CALCIUM GLUCONATE 10 MG/ML
1 INJECTION, SOLUTION INTRAVENOUS ONCE
Status: COMPLETED | OUTPATIENT
Start: 2024-03-26 | End: 2024-03-26

## 2024-03-26 RX ORDER — AMIODARONE HYDROCHLORIDE 200 MG/1
200 TABLET ORAL DAILY
Status: DISCONTINUED | OUTPATIENT
Start: 2024-03-26 | End: 2024-03-26

## 2024-03-26 RX ORDER — ATORVASTATIN CALCIUM 40 MG/1
40 TABLET, FILM COATED ORAL DAILY
Status: DISCONTINUED | OUTPATIENT
Start: 2024-03-27 | End: 2024-03-28

## 2024-03-26 NOTE — CM/SW NOTE
03/26/24 1500   CM/SW Referral Data   Referral Source Social Work (self-referral)   Reason for Referral Discharge planning   Informant EMR   Medical Hx   Does patient have an established PCP? Yes  (Donavan Noonan)   Patient Info   Patient's Home Environment Independent [facility]  ([facility] in [place])   Patient lives with Spouse/Significant other   Patient Status Prior to Admission   Independent with ADLs and Mobility No   Pt. requires assistance with Ambulating   Services in place prior to admission DME/Supplies at home;Home Health Care   Home Health Provider Info [facility]   Type of DME/Supplies Standard Walker   Discharge Needs   Anticipated D/C needs Home health care;To be determined       SW self referred pt for DC Planning after chart review.    Above assessment completed on chart review from pt's last Admission in February 2024.    Per chart, pt is from [facility] in [place] w/ his wife. Pt ambulates w/ a walker at baseline.    Pt was Dc'd from [facility] w/ [facility] arranged.    SW confirmed w/ roderick Mcfarland from [facility] - pt is current w/ their HH RN and PT services. TAM order entered.    PLAN: [facility] w/ [facility] - pending clinical course & med clear      SW/CM to remain available for support and/or discharge planning.         Libra Ladd, MSW, LSW [id number]

## 2024-03-26 NOTE — ED QUICK NOTES
Attempted to call daughter on the phone for updates. This RN was sent to [facility]. Will try again later.

## 2024-03-26 NOTE — CONSULTS
Cardiology Consultation  [facility]    Edward Heard Patient Status:  Emergency    1934 MRN [id number]   Location [facility] EMERGENCY DEPARTMENT Attending Manny Mcqueen MD   Hosp Day # 0 PCP Donavan Noonan MD     Reason for Consultation:  Hyperkalemia    History of Present Illness:  Edward Heard is a a(n) 89 year old male with atrial fibrillation, HDL, hx TIA, PVD who presents for hyperkalemia and OZZIE.    Patient initially was admitted in late 2024 in decompensated CHF and afib with RVR that was unresponsive to euvolemia and rate control.  He was cardioverted during that admission after diuresis.  TTE obtained on admission was depressed to 15-20%, but most recent TTE done in March showed significant improvement of LV function to 50-55%. He has been slowly uptitrated on GDMT in clinic. He was recently started on Jardiance and switched from ACE inhibitor to Entresto.  At home, his wife stated that he had not been drinking significant amounts of water.  He was also noted to have dizziness and some diarrhea.  Jardiance was discontinued and a BMP was ordered.  On BMP that was done on 3/25/2024, creatinine was elevated to 2.25 (baseline 1.17) and potassium was 6.3.  Patient was then asked to come to the emergency room.    Here, he received 1 L normal saline IV.  Repeat labs showed potassium 5.8 prior to fluids. Cr 2.08.    Patient also had HR in the 30's on admission, now in the 60's bpm with /56 mmHg.    Assessment/Plan:  Hyperkalemia likely 2/2 OZZIE  Atrial fibrillation, now in NSR. On amio 200mg daily  Chronic systolic CHF, LVEF recovered to 50-55%  OZZIE likely 2/2 volume depletion/over diuresis.  HLD  CAD    Plan  Hold entresto, MRA. Can discontinue Jardiance.  Continue metoprolol succinate  Discontinue amiodarone 200mg daily  May need lower dose of apixaban 2.5mg BID (wt > 60 kg, Cr > 1.5).  Once OZZIE resolved, can slowly restart GDMT    History:  Past Medical History:    Diagnosis Date    Cataract     Dry eye     Former smoker     Hyperlipidemia     Impaired fasting glucose     Left carotid bruit     Orbital mass     left    Pseudophakia     PVD (peripheral vascular disease) (HCC)     Stroke (cerebrum) ([facility])      Past Surgical History:   Procedure Laterality Date    BEDSIDE CARDIOVERSION  2024    CAROTID ENDARTERECTOMY Left     CATARACT Bilateral     YAG CAPSULOTOMY - OD - RIGHT EYE Right 2005     Family History   Problem Relation Age of Onset    Cataracts Mother       reports that he quit smoking about 45 years ago. His smoking use included cigarettes. He has a 30 pack-year smoking history. He has never been exposed to tobacco smoke. He has never used smokeless tobacco. He reports that he does not drink alcohol and does not use drugs.    Allergies:  No Known Allergies    Medications:  Current Facility-Administered Medications on File Prior to Encounter   Medication Dose Route Frequency Provider Last Rate Last Admin    [COMPLETED] fentaNYL (Sublimaze) 50 mcg/mL injection 25 mcg  25 mcg Intravenous Once Mau Grigsby MD   25 mcg at 24 1000    [COMPLETED] midazolam (Versed) 2 MG/2ML injection 2 mg  2 mg Intravenous Once Mau Grigsby MD   2 mg at 24 1000    [COMPLETED] benzocaine (Hurricaine/Topex) 20 % mouth spray 1 spray  1 spray Mouth/Throat Once Mau Grigsby MD   Given at 24 1000    [COMPLETED] methohexital (BrevITAL) 100 mg/10mL IV syringe 10 mg  10 mg Intravenous Once Darryl Vyas MD   10 mg at 24 1015    [COMPLETED] amiodarone (Cordarone) 150 mg in dextrose 5% 100 mL IV bolus  150 mg Intravenous Once Mau Grigsby  mL/hr at 24 1456 150 mg at 24 1456    Followed by    [] amiodarone (Cordarone) 450 mg in dextrose 5% 250 mL infusion  1 mg/min Intravenous Continuous Mau Grigsby MD 33.3 mL/hr at 24 1530 1 mg/min at 24 1530    [COMPLETED] metoprolol tartrate (Lopressor) partial tab 12.5 mg   12.5 mg Oral Once Mau Grigsby MD   12.5 mg at 02/07/24 1613    [COMPLETED] sodium zirconium cyclosilicate (Lokelma) oral packet 10 g  10 g Oral Once Casey Alex, DO   10 g at 02/07/24 1944    [COMPLETED] iopamidol 76% (ISOVUE-370) injection for power injector  80 mL Intravenous ONCE PRN Long, Alex, DO   80 mL at 02/07/24 1639    [COMPLETED] sodium chloride 0.9 % IV bolus 500 mL  500 mL Intravenous Once Mau Grigsby  mL/hr at 02/05/24 1040 500 mL at 02/05/24 1040    [COMPLETED] heparin (Porcine) 1000 UNIT/ML injection             [COMPLETED] lidocaine PF (Xylocaine-MPF) 2 % injection             [COMPLETED] heparin in sodium chloride 0.9% (Porcine) 2 Units/mL flush bag premix             [COMPLETED] heparin in sodium chloride 0.9% (Porcine) 2 Units/mL flush bag premix             [COMPLETED] Nitroglycerin in D5W 200-5 MCG/ML-% injection             [COMPLETED] midazolam (Versed) 2 MG/2ML injection             [COMPLETED] fentaNYL (Sublimaze) 50 mcg/mL injection             [COMPLETED] aspirin 81 MG chewable tab             [COMPLETED] verapamil (Isoptin) 2.5 mg/mL injection             [COMPLETED] iohexol (Omnipaque) 300 MG/ML injection 100 mL  100 mL Injection ONCE PRN aMu Grigsby MD   85 mL at 02/05/24 1638    [COMPLETED] sodium chloride 0.9 % IV bolus 250 mL  250 mL Intravenous Once Dom Valencia  mL/hr at 02/05/24 2140 250 mL at 02/05/24 2140    [COMPLETED] digoxin (Lanoxin) 250 MCG/ML injection 250 mcg  250 mcg Intravenous Once Dom Valencia MD   250 mcg at 02/05/24 2140    [COMPLETED] metoprolol tartrate (Lopressor) tab 25 mg  25 mg Oral Once Darryl Vyas MD   25 mg at 02/04/24 1632    [COMPLETED] furosemide (Lasix) 10 mg/mL injection 40 mg  40 mg Intravenous Once Darryl Vyas MD   40 mg at 02/03/24 1158    [COMPLETED] dilTIAZem (cardIZEM) 25 mg/5mL injection 10 mg  10 mg Intravenous Once Chaya Perera MD   10 mg at 02/01/24 1121    [COMPLETED] furosemide (Lasix) 10  mg/mL injection 40 mg  40 mg Intravenous Once Chaya Perera MD   40 mg at 02/01/24 1218    [COMPLETED] Perflutren Lipid Microsphere (DEFINITY) 6.52 MG/ML injection 1.5 mL  1.5 mL Intravenous ONCE PRN Aaliyah Alva MD   1.5 mL at 02/01/24 1544    [COMPLETED] furosemide (Lasix) 10 mg/mL injection 40 mg  40 mg Intravenous Once Mau Grigsby MD   40 mg at 02/01/24 1744     Current Outpatient Medications on File Prior to Encounter   Medication Sig Dispense Refill    lisinopril 2.5 MG Oral Tab Take 1 tablet (2.5 mg total) by mouth daily. 90 tablet 3    metoprolol succinate  MG Oral Tablet 24 Hr Take 1 tablet (100 mg total) by mouth every evening. 90 tablet 3    spironolactone 25 MG Oral Tab Take 1 tablet (25 mg total) by mouth daily. 90 tablet 3    amiodarone 200 MG Oral Tab Take 1 tablet (200 mg total) by mouth daily. 90 tablet 3    furosemide 20 MG Oral Tab Take 1 tablet (20 mg total) by mouth daily. 30 tablet 0    atorvastatin 40 MG Oral Tab Take 1 tablet (40 mg total) by mouth daily. 90 tablet 0    apixaban 5 MG Oral Tab Take 1 tablet (5 mg total) by mouth 2 (two) times daily. 60 tablet 0    metFORMIN 500 MG Oral Tab Take 1 tablet (500 mg total) by mouth daily with breakfast.      MULTIVITAMINS OR one tab daily         Review of Systems:  Constitutional: denies fevers, chills, night sweats  HEENT: denies headache, vision changes, trouble or pain with swallowing  Cardiac: denies chest pain, palpitations, edema  Pulm: denies dyspnea, cough, wheeze  GI: denies n/v, abd pain, diarrhea or constipation  : denies hematuria, dysuria, incontinence  MSK: denies muscle or joint pains  Neuro: denies numbness, weakness, paresthesias  Psych: denies anxiety, depression  Integument: denies skin rashes or lesions  Heme: denies easy bruising or bleeding  Endo: denies heat/cold intolerance, skin or nail changes      Physical Exam:  Blood pressure 139/56, pulse 63, temperature 97.5 °F (36.4 °C), temperature source  Oral, resp. rate 13, height 6' 1\" (1.854 m), weight 142 lb (64.4 kg), SpO2 99%.  Wt Readings from Last 3 Encounters:   03/26/24 142 lb (64.4 kg)   02/10/24 154 lb 1.6 oz (69.9 kg)   10/18/21 168 lb (76.2 kg)       General: awake, alert, oriented x 3, no acute distress  HEENT: at/nc, perrl, eomi  Neck: No JVD, carotids 2+ no bruits.  Cardiac: Regular rate and rhythm, S1, S2 normal, no murmur, rub or gallop.  Lungs: Clear without wheezes, rales, rhonchi or dullness.  Normal excursions and effort.  Abdomen: Soft, non-tender, non-distended, normal bowel sounds   Extremities: Without clubbing, cyanosis or edema.  Peripheral pulses are 2+.  Neurologic: Alert and oriented, normal affect.  Psych: normal mood and affect  Skin: Warm and dry.       Laboratories and Data:  Diagnostics:    Labs:   CBC:    Lab Results   Component Value Date    WBC 10.3 03/26/2024    WBC 10.2 02/06/2024    WBC 9.0 02/05/2024     Lab Results   Component Value Date    HEMOGLOBIN 12.7 04/02/2015    HEMOGLOBIN 12.7 10/01/2014    HEMOGLOBIN 12.7 04/02/2014    HGB 9.3 (L) 03/26/2024    HGB 11.6 (L) 02/06/2024    HGB 10.8 (L) 02/05/2024      Lab Results   Component Value Date    .0 03/26/2024    .0 02/06/2024    .0 02/05/2024     BMP:     Lab Results   Component Value Date    GLUCOSE 136 (H) 04/02/2015    GLUCOSE 122 (H) 10/01/2014    GLUCOSE 114 (H) 04/02/2014     Lab Results   Component Value Date    K 5.8 (H) 03/26/2024    K 6.3 (HH) 03/25/2024    K 3.8 02/08/2024     Lab Results   Component Value Date    BUN 50 (H) 03/26/2024    BUN 51 (H) 03/25/2024    BUN 20 02/08/2024     Lab Results   Component Value Date    CREATSERUM 2.08 (H) 03/26/2024    CREATSERUM 2.25 (H) 03/25/2024    CREATSERUM 1.17 02/08/2024     Cholesterol:     Lab Results   Component Value Date    CHOLEST 88 02/06/2024    CHOLEST 133.00 10/18/2021    CHOLEST 116.00 10/01/2020     Lab Results   Component Value Date    HDL 32 (L) 02/06/2024    HDL 58 10/18/2021     HDL 54 10/01/2020     Lab Results   Component Value Date    TRIG 60 02/06/2024    TRIG 44.00 10/18/2021    TRIG 42.00 10/01/2020    TRIGLY 61 04/02/2015    TRIGLY 75 10/01/2014    TRIGLY 63 04/02/2014     Lab Results   Component Value Date    LDL 42 02/06/2024    LDL 66 10/18/2021    LDL 54 10/01/2020     Lab Results   Component Value Date    AST 16 03/26/2024    AST 25 02/01/2024    AST 19 10/18/2021     Lab Results   Component Value Date    ALT 14 03/26/2024    ALT 20 02/01/2024    ALT 14 10/18/2021           Mau Grigsby MD  Interventional Cardiology  [place]  3/26/2024  1:24 PM

## 2024-03-26 NOTE — ED QUICK NOTES
Orders for admission, patient is aware of plan and ready to go upstairs. Any questions, please call ED RN Quynh at extension [phone].     Patient Covid vaccination status: Fully vaccinated     COVID Test Ordered in ED: None    COVID Suspicion at Admission: N/A    Running Infusions:      Mental Status/LOC at time of transport: ALERT    Other pertinent information: On room air, ambulates with a walker, 20G IV L AC, 18G IV R AC, family at bedside    CIWA score: N/A   NIH score:  N/A

## 2024-03-26 NOTE — PLAN OF CARE
Patient is alert and oriented on room air able to ambulate one with a walker. Patient oriented to room and call light and updated on plan of care. Telemetry monitor in place. Patient's family updated on plan of care over the phone.    Problem: Patient Centered Care  Goal: Patient preferences are identified and integrated in the patient's plan of care  Description: Interventions:  - What would you like us to know as we care for you? I am from independent living with my wife.  - Provide timely, complete, and accurate information to patient/family  - Incorporate patient and family knowledge, values, beliefs, and cultural backgrounds into the planning and delivery of care  - Encourage patient/family to participate in care and decision-making at the level they choose  - Honor patient and family perspectives and choices  Outcome: Progressing     Problem: Diabetes/Glucose Control  Goal: Glucose maintained within prescribed range  Description: INTERVENTIONS:  - Monitor Blood Glucose as ordered  - Assess for signs and symptoms of hyperglycemia and hypoglycemia  - Administer ordered medications to maintain glucose within target range  - Assess barriers to adequate nutritional intake and initiate nutrition consult as needed  - Instruct patient on self management of diabetes  Outcome: Progressing     Problem: Patient/Family Goals  Goal: Patient/Family Long Term Goal  Description: Patient's Long Term Goal: To go home    Interventions:  - medications   - See additional Care Plan goals for specific interventions  Outcome: Progressing  Goal: Patient/Family Short Term Goal  Description: Patient's Short Term Goal: Eat dinner    Interventions:   - provided with menu and help ordering  - See additional Care Plan goals for specific interventions  Outcome: Progressing

## 2024-03-26 NOTE — ED PROVIDER NOTES
Patient Seen in: [facility] Emergency Department    History     Chief Complaint   Patient presents with    Abnormal Result       HPI    The patient presents to the ED from his cardiologist clinic for a slow heart rate and hyperkalemia.  Laboratory testing done yesterday showed a potassium of 6.3 and acute kidney injury.  Patient complains of some dizziness but denies other complaints.  No chest pain, shortness of breath or other complaints.  History of atrial fibrillation, on apixaban.    History reviewed.   Past Medical History:   Diagnosis Date    Cataract     Dry eye     Former smoker     Hyperlipidemia     Impaired fasting glucose     Left carotid bruit     Orbital mass     left    Pseudophakia     PVD (peripheral vascular disease) (HCC)     Stroke (cerebrum) ([facility])        History reviewed.   Past Surgical History:   Procedure Laterality Date    BEDSIDE CARDIOVERSION  2/9/2024    CAROTID ENDARTERECTOMY Left 1995    CATARACT Bilateral     YAG CAPSULOTOMY - OD - RIGHT EYE Right 06/21/2005         Medications :  Medications Prior to Admission   Medication Sig Dispense Refill Last Dose    lisinopril 2.5 MG Oral Tab Take 1 tablet (2.5 mg total) by mouth daily. 90 tablet 3 3/25/2024    metoprolol succinate  MG Oral Tablet 24 Hr Take 1 tablet (100 mg total) by mouth every evening. 90 tablet 3 3/25/2024    spironolactone 25 MG Oral Tab Take 1 tablet (25 mg total) by mouth daily. 90 tablet 3 3/25/2024    amiodarone 200 MG Oral Tab Take 1 tablet (200 mg total) by mouth daily. 90 tablet 3 3/25/2024    furosemide 20 MG Oral Tab Take 1 tablet (20 mg total) by mouth daily. 30 tablet 0 3/25/2024    atorvastatin 40 MG Oral Tab Take 1 tablet (40 mg total) by mouth daily. 90 tablet 0 3/25/2024    apixaban 5 MG Oral Tab Take 1 tablet (5 mg total) by mouth 2 (two) times daily. 60 tablet 0 3/25/2024    metFORMIN 500 MG Oral Tab Take 1 tablet (500 mg total) by mouth daily with breakfast.   3/25/2024    MULTIVITAMINS OR  one tab daily   3/25/2024        Family History   Problem Relation Age of Onset    Cataracts Mother        Smoking Status:   Social History     Socioeconomic History    Marital status:    Tobacco Use    Smoking status: Former     Packs/day: 1.00     Years: 30.00     Additional pack years: 0.00     Total pack years: 30.00     Types: Cigarettes     Quit date: 1978     Years since quittin.3     Passive exposure: Never    Smokeless tobacco: Never   Vaping Use    Vaping Use: Never used   Substance and Sexual Activity    Alcohol use: No     Alcohol/week: 0.0 standard drinks of alcohol    Drug use: No       Constitutional and vital signs reviewed.      Social History and Family History elements reviewed from today, pertinent positives to the presenting problem noted.    Physical Exam     ED Triage Vitals [24 1010]   /35   Pulse (!) 38   Resp 18   Temp 97.5 °F (36.4 °C)   Temp src Oral   SpO2 99 %   O2 Device None (Room air)       All measures to prevent infection transmission during my interaction with the patient were taken. Handwashing was performed prior to and after the exam.  Stethoscope and any equipment used during my examination was cleaned with super sani-cloth germicidal wipes following the exam.     Physical Exam  Vitals and nursing note reviewed.   Constitutional:       General: He is not in acute distress.     Appearance: Normal appearance. He is well-developed.   HENT:      Head: Normocephalic and atraumatic.   Eyes:      General:         Right eye: No discharge.         Left eye: No discharge.      Conjunctiva/sclera: Conjunctivae normal.   Neck:      Trachea: No tracheal deviation.   Cardiovascular:      Rate and Rhythm: Regular rhythm. Bradycardia present.   Pulmonary:      Effort: Pulmonary effort is normal. No respiratory distress.      Breath sounds: No stridor.   Abdominal:      General: Bowel sounds are normal. There is no distension.      Palpations: Abdomen is soft.    Musculoskeletal:         General: No deformity.   Skin:     General: Skin is warm and dry.   Neurological:      Mental Status: He is alert and oriented to person, place, and time.   Psychiatric:         Mood and Affect: Mood normal.         Behavior: Behavior normal.         ED Course        Labs Reviewed   COMP METABOLIC PANEL (14) - Abnormal; Notable for the following components:       Result Value    Glucose 208 (*)     Potassium 5.8 (*)     BUN 50 (*)     Creatinine 2.08 (*)     BUN/CREA Ratio 24.0 (*)     Calculated Osmolality 305 (*)     eGFR-Cr 30 (*)     Globulin  2.5 (*)     All other components within normal limits   POCT GLUCOSE - Abnormal; Notable for the following components:    POC Glucose  227 (*)     All other components within normal limits   CBC W/ DIFFERENTIAL - Abnormal; Notable for the following components:    RBC 3.00 (*)     HGB 9.3 (*)     HCT 29.2 (*)     RDW-SD 49.4 (*)     Neutrophil Absolute Prelim 7.90 (*)     Neutrophil Absolute 7.90 (*)     All other components within normal limits   TROPONIN I HIGH SENSITIVITY - Normal   POCT GLUCOSE - Normal   CBC WITH DIFFERENTIAL WITH PLATELET    Narrative:     The following orders were created for panel order CBC With Differential With Platelet.                  Procedure                               Abnormality         Status                                     ---------                               -----------         ------                                     CBC W/ DIFFERENTIAL[[id number]]          Abnormal            Final result                                                 Please view results for these tests on the individual orders.   RAINBOW DRAW LAVENDER   RAINBOW DRAW LIGHT GREEN   RAINBOW DRAW BLUE     EKG    Rate, intervals and axes as noted on EKG Report.  Rate: Bradycardic, 35 bpm  Rhythm: Sinus Rhythm  Reading: Marked sinus bradycardia, first-degree AV block, septal Q waves, abnormal EKG           As Interpreted by  me    Imaging Results Available and Reviewed while in ED: No results found.  ED Medications Administered:   Medications   acetaminophen (Tylenol Extra Strength) tab 500 mg (has no administration in time range)   polyethylene glycol (PEG 3350) (Miralax) 17 g oral packet 17 g (has no administration in time range)   sennosides (Senokot) tab 17.2 mg (has no administration in time range)   bisacodyl (Dulcolax) 10 MG rectal suppository 10 mg (has no administration in time range)   ondansetron (Zofran) 4 MG/2ML injection 4 mg (has no administration in time range)   metoclopramide (Reglan) 5 mg/mL injection 5 mg (has no administration in time range)   glucose (Dex4) 15 GM/59ML oral liquid 15 g (has no administration in time range)     Or   glucose (Glutose) 40% oral gel 15 g (has no administration in time range)     Or   glucose-vitamin C (Dex-4) chewable tab 4 tablet (has no administration in time range)     Or   dextrose 50% injection 50 mL (has no administration in time range)     Or   glucose (Dex4) 15 GM/59ML oral liquid 30 g (has no administration in time range)     Or   glucose (Glutose) 40% oral gel 30 g (has no administration in time range)     Or   glucose-vitamin C (Dex-4) chewable tab 8 tablet (has no administration in time range)   insulin aspart (NovoLOG) 100 Units/mL FlexPen 1-7 Units ( Subcutaneous Not Given 3/26/24 1700)   atorvastatin (Lipitor) tab 40 mg (has no administration in time range)   lisinopril (Prinivil; Zestril) tab 2.5 mg ( Oral Automatically Held 3/30/24 0930)   apixaban (Eliquis) tab 2.5 mg (has no administration in time range)   calcium gluconate 1g in 100mL iso-NaCl IVPB premix (0 g Intravenous Stopped 3/26/24 1100)   insulin regular human (Novolin R, Humulin R) 100 UNIT/ML injection 10 Units (10 Units Intravenous Given 3/26/24 1041)   dextrose 10% infusion 250 mL (0 mL Intravenous Stopped 3/26/24 1225)   sodium chloride 0.9 % IV bolus 1,000 mL (0 mL Intravenous Stopped 3/26/24 1216)          MDM     Vitals:    03/26/24 1300 03/26/24 1430 03/26/24 1454 03/26/24 1459   BP: 139/56  154/54    BP Location:   Right arm    Pulse: 63 60 61    Resp: 13 18 18    Temp:   97.6 °F (36.4 °C)    TempSrc:   Oral    SpO2: 99% 100% 100%    Weight:    64.4 kg   Height:         *I personally reviewed and interpreted all ED vitals.    Pulse Ox: 100%, Room air, Normal     Monitor Interpretation:   normal sinus rhythm, sinus bradycardia as interpreted by me.  The cardiac monitor was ordered to monitor heart rate.    Differential Diagnosis/ Diagnostic Considerations: Hyperkalemia, dehydration, arrhythmia, other    Complicating Factors: The patient already has does not have any pertinent problems on file. to contribute to the complexity of this ED evaluation.    Medical Decision Making  The patient presents to the ED with severe bradycardia and concern for significant hyperkalemia.  Laboratory testing yesterday with a potassium of 6.3.  Heart rate 33 on ED arrival with QRS prolongation.  Sinus rhythm.  Patient given hyperkalemia medications given concern for hyperkalemia.  Also given IV fluids and improved after around 30 to 45 minutes.  Heart rate increased to 60 and patient felt improved.  Laboratory testing with ongoing OZZIE however hyperkalemia mildly improved.  I feel the patient will need admission for hydration and electrolyte management.  I discussed with Dr. Huang for admission and duly cardiology.    Problems Addressed:  Hyperkalemia: acute illness or injury that poses a threat to life or bodily functions    Amount and/or Complexity of Data Reviewed  Labs: ordered. Decision-making details documented in ED Course.  ECG/medicine tests: ordered and independent interpretation performed. Decision-making details documented in ED Course.  Discussion of management or test interpretation with external provider(s):  I discussed with Dr. Huang for admission and duly cardiology.        Condition upon leaving the department:  Stable    Disposition and Plan     Clinical Impression:  1. Hyperkalemia        Disposition:  Admit    Follow-up:  No follow-up provider specified.    Medications Prescribed:  Current Discharge Medication List          Hospital Problems       Present on Admission  Date Reviewed: 3/14/2022            ICD-10-CM Noted POA    * (Principal) Hyperkalemia E87.5 3/26/2024 Unknown

## 2024-03-26 NOTE — H&P
[facility] Hospitalist H&P       CC:   Chief Complaint   Patient presents with    Abnormal Result        PCP: Donavan Noonan MD    History of Present Illness: Patient is a 89 year old male with PMH of CVA, PVD, HL A-fib on Eliquis, s/p DCCV 2/8/24, CAD with severe left main disease, Systolic HF, CKD, DM, who presents with dizziness and abnormal labs.  Patient was getting routine blood work yesterday and was found to have OZZIE and hyperkalemia.  Patient was sent to the ER and presented with bradycardia but after treating potassium patient's heart rate improved to 60s.  Patient was sent home and returns today for ongoing dizziness.  On arrival patient was found to be in the high 30s and 40s.  Patient was again treated for hyperkalemia with improvement in heart rate back to the 60s.  Patient states that he has been having dizziness for several weeks now and feels more lightheaded with change in position.  Patient denies any shortness of breath or chest pain, no abdominal pain, nausea or vomiting, fever or chills.  Wife and daughter at bedside state that patient has had decreased appetite and has lost about 10 pounds since his last admission last month.    PMH  Past Medical History:   Diagnosis Date    Cataract     Dry eye     Former smoker     Hyperlipidemia     Impaired fasting glucose     Left carotid bruit     Orbital mass     left    Pseudophakia     PVD (peripheral vascular disease) (HCC)     Stroke (cerebrum) (HCC)         PSH  Past Surgical History:   Procedure Laterality Date    BEDSIDE CARDIOVERSION  2/9/2024    CAROTID ENDARTERECTOMY Left 1995    CATARACT Bilateral     YAG CAPSULOTOMY - OD - RIGHT EYE Right 06/21/2005        ALL:  No Known Allergies     Home Medications:  No outpatient medications have been marked as taking for the 3/26/24 encounter (Hospital Encounter).         Soc Hx  Social History     Tobacco Use    Smoking status: Former     Packs/day: 1.00     Years: 30.00     Additional pack years: 0.00      Total pack years: 30.00     Types: Cigarettes     Quit date: 1978     Years since quittin.3     Passive exposure: Never    Smokeless tobacco: Never   Substance Use Topics    Alcohol use: No     Alcohol/week: 0.0 standard drinks of alcohol        Fam Hx  Family History   Problem Relation Age of Onset    Cataracts Mother        Review of Systems  Comprehensive ROS reviewed and negative except for what's stated above.     OBJECTIVE:  /49   Pulse 64   Temp 97.5 °F (36.4 °C) (Oral)   Resp 19   Ht 6' 1\" (1.854 m)   Wt 142 lb (64.4 kg)   SpO2 100%   BMI 18.73 kg/m²   General: Alert, no acute distress  HEENT: oral mucosa normal   Neck: non tender, no adenopathy   Lungs: clear to ausculation bilaterally  Heart: Regular rate and rhythm  Abdomen: soft, non tender, non distended   Extremities: No edema  Skin: no new rash, normal color  Neuro: 5/5 strength in bilateral extremities, normal sensation extremities  Psych: appropriate affect   Diagnostic Data:    CBC/Chem  Recent Labs   Lab 24  1028   WBC 10.3   HGB 9.3*   MCV 97.3   .0       Recent Labs   Lab 24  1119 24  1028    138   K 6.3* 5.8*    109   CO2 25.0 24.0   BUN 51* 50*   CREATSERUM 2.25* 2.08*   * 208*   CA 9.5 9.3       Recent Labs   Lab 24  1028   ALT 14   AST 16   ALB 3.8       No results for input(s): \"TROP\" in the last 168 hours.    Additional Diagnostics: ECG: Sinus bradycardia with first-degree AV block    Radiology: No results found.      ASSESSMENT / PLAN:   Patient is a 89 year old male with PMH of CVA, PVD, HL A-fib on Eliquis, s/p DCCV 24, CAD with severe left main disease, Systolic HF, CKD, DM, who presents with dizziness and abnormal labs.  Admitted for symptomatic bradycardia with hyperkalemia.    Symptomatic bradycardia  - HR in the 30s/40s  - dizzy and orthostatis   - IVF given  - hold BB and diuretics  - Cards consulted      Chronic Systolic CHF- EF 15-20%   - euvolemic  to dry on admission   - hold lasix 20 mg daily   - hold metoprolol 50 BID  - Lisinopril 2.5 daily ad tolerated     Afib on Eliquis   Secondary Hypercoagulable State   - DCCV 2/8/24  - cardiology consulted  - hold metop and bradycardia  - amio         OZZIE on CKD  - Likely prerenal from hypoperfusion due to bradycardia  - Hold beta-blocker, diuretics  - ACE as tolerated  - IV fluids given  - Continue to trend, watch close      Hx CVA  Changed to eliquis alone   Stopped plavix      PVD/HL  - statin- increased to 40 daily      DM2 A1c 6.6  - on jardiance for CHF standpoint   - hold metformin   - ISS       ACP  - CODE- DNR/full- confirmed with patient  - POA- wife, then daughter  - lives with spouse at independent living     FN:  - IVF: 0.9 given   - Diet: cardiac     DVT Prophy: SCDs Eliquis   Atrophy: Ambulate PT/OT  Lines: Piv    Dispo: pending clinical course    Outpatient records or previous hospital records reviewed.     Further recommendations pending patient's clinical course.  [facility] hospitalist to continue to follow patient while in house    Patient and/or patient's family given opportunity to ask questions and note understanding and agreeing with therapeutic plan as outlined    Thank You,  Aroldo Huang MD    [facility] Hospitalist  Answering Service number: [phone]

## 2024-03-27 LAB
ANION GAP SERPL CALC-SCNC: 5 MMOL/L (ref 0–18)
ATRIAL RATE: 35 BPM
BASOPHILS # BLD AUTO: 0.05 X10(3) UL (ref 0–0.2)
BASOPHILS NFR BLD AUTO: 0.6 %
BUN BLD-MCNC: 34 MG/DL (ref 9–23)
BUN/CREAT SERPL: 21.3 (ref 10–20)
CALCIUM BLD-MCNC: 9.6 MG/DL (ref 8.7–10.4)
CHLORIDE SERPL-SCNC: 112 MMOL/L (ref 98–112)
CO2 SERPL-SCNC: 23 MMOL/L (ref 21–32)
CREAT BLD-MCNC: 1.6 MG/DL
DEPRECATED RDW RBC AUTO: 49 FL (ref 35.1–46.3)
EGFRCR SERPLBLD CKD-EPI 2021: 41 ML/MIN/1.73M2 (ref 60–?)
EOSINOPHIL # BLD AUTO: 0.19 X10(3) UL (ref 0–0.7)
EOSINOPHIL NFR BLD AUTO: 2.1 %
ERYTHROCYTE [DISTWIDTH] IN BLOOD BY AUTOMATED COUNT: 14.1 % (ref 11–15)
GLUCOSE BLD-MCNC: 109 MG/DL (ref 70–99)
GLUCOSE BLDC GLUCOMTR-MCNC: 112 MG/DL (ref 70–99)
GLUCOSE BLDC GLUCOMTR-MCNC: 145 MG/DL (ref 70–99)
GLUCOSE BLDC GLUCOMTR-MCNC: 148 MG/DL (ref 70–99)
GLUCOSE BLDC GLUCOMTR-MCNC: 164 MG/DL (ref 70–99)
HCT VFR BLD AUTO: 30.6 %
HGB BLD-MCNC: 10 G/DL
IMM GRANULOCYTES # BLD AUTO: 0.02 X10(3) UL (ref 0–1)
IMM GRANULOCYTES NFR BLD: 0.2 %
LYMPHOCYTES # BLD AUTO: 1.72 X10(3) UL (ref 1–4)
LYMPHOCYTES NFR BLD AUTO: 19.1 %
MAGNESIUM SERPL-MCNC: 2.2 MG/DL (ref 1.6–2.6)
MCH RBC QN AUTO: 31.3 PG (ref 26–34)
MCHC RBC AUTO-ENTMCNC: 32.7 G/DL (ref 31–37)
MCV RBC AUTO: 95.6 FL
MONOCYTES # BLD AUTO: 0.88 X10(3) UL (ref 0.1–1)
MONOCYTES NFR BLD AUTO: 9.8 %
NEUTROPHILS # BLD AUTO: 6.14 X10 (3) UL (ref 1.5–7.7)
NEUTROPHILS # BLD AUTO: 6.14 X10(3) UL (ref 1.5–7.7)
NEUTROPHILS NFR BLD AUTO: 68.2 %
OSMOLALITY SERPL CALC.SUM OF ELEC: 298 MOSM/KG (ref 275–295)
P AXIS: 65 DEGREES
P-R INTERVAL: 260 MS
PLATELET # BLD AUTO: 299 10(3)UL (ref 150–450)
POTASSIUM SERPL-SCNC: 5 MMOL/L (ref 3.5–5.1)
Q-T INTERVAL: 530 MS
QRS DURATION: 122 MS
QTC CALCULATION (BEZET): 404 MS
R AXIS: 24 DEGREES
RBC # BLD AUTO: 3.2 X10(6)UL
SODIUM SERPL-SCNC: 140 MMOL/L (ref 136–145)
T AXIS: 74 DEGREES
VENTRICULAR RATE: 35 BPM
WBC # BLD AUTO: 9 X10(3) UL (ref 4–11)

## 2024-03-27 PROCEDURE — 82962 GLUCOSE BLOOD TEST: CPT

## 2024-03-27 PROCEDURE — 83735 ASSAY OF MAGNESIUM: CPT | Performed by: INTERNAL MEDICINE

## 2024-03-27 PROCEDURE — 80048 BASIC METABOLIC PNL TOTAL CA: CPT | Performed by: INTERNAL MEDICINE

## 2024-03-27 PROCEDURE — 85025 COMPLETE CBC W/AUTO DIFF WBC: CPT | Performed by: INTERNAL MEDICINE

## 2024-03-27 RX ORDER — PANTOPRAZOLE SODIUM 40 MG/1
40 TABLET, DELAYED RELEASE ORAL
Status: DISCONTINUED | OUTPATIENT
Start: 2024-03-27 | End: 2024-03-28

## 2024-03-27 NOTE — PLAN OF CARE
Pt reports no dizziness when ambulating. HR's in 50-60's during day. Cardiology on consult. Son at bedside. Call light within reach and safety precautions in place.     Problem: Patient Centered Care  Goal: Patient preferences are identified and integrated in the patient's plan of care  Description: Interventions:  - What would you like us to know as we care for you?   - Provide timely, complete, and accurate information to patient/family  - Incorporate patient and family knowledge, values, beliefs, and cultural backgrounds into the planning and delivery of care  - Encourage patient/family to participate in care and decision-making at the level they choose  - Honor patient and family perspectives and choices  Outcome: Progressing     Problem: Diabetes/Glucose Control  Goal: Glucose maintained within prescribed range  Description: INTERVENTIONS:  - Monitor Blood Glucose as ordered  - Assess for signs and symptoms of hyperglycemia and hypoglycemia  - Administer ordered medications to maintain glucose within target range  - Assess barriers to adequate nutritional intake and initiate nutrition consult as needed  - Instruct patient on self management of diabetes  Outcome: Progressing     Problem: Patient/Family Goals  Goal: Patient/Family Long Term Goal  Description: Patient's Long Term Goal:     Interventions:  -   - See additional Care Plan goals for specific interventions  Outcome: Progressing  Goal: Patient/Family Short Term Goal  Description: Patient's Short Term Goal:     Interventions:   -   - See additional Care Plan goals for specific interventions  Outcome: Progressing

## 2024-03-27 NOTE — PLAN OF CARE
Patient is alert & oriented x4 on RA. Vital signs stable at this time. Lowest HR 29 remains asymptomatic; MD aware. Fall precautions maintained - bed alarm on, bed locked in lowest position, call light and personal belongings within reach, non-skid socks in place. Frequent rounding by nursing staff. Plan card consult.     Problem: Patient Centered Care  Goal: Patient preferences are identified and integrated in the patient's plan of care  Description: Interventions:  - What would you like us to know as we care for you? From [facility]  - Provide timely, complete, and accurate information to patient/family  - Incorporate patient and family knowledge, values, beliefs, and cultural backgrounds into the planning and delivery of care  - Encourage patient/family to participate in care and decision-making at the level they choose  - Honor patient and family perspectives and choices  Outcome: Progressing     Problem: Diabetes/Glucose Control  Goal: Glucose maintained within prescribed range  Description: INTERVENTIONS:  - Monitor Blood Glucose as ordered  - Assess for signs and symptoms of hyperglycemia and hypoglycemia  - Administer ordered medications to maintain glucose within target range  - Assess barriers to adequate nutritional intake and initiate nutrition consult as needed  - Instruct patient on self management of diabetes  Outcome: Progressing     Problem: Patient/Family Goals  Goal: Patient/Family Long Term Goal  Description: Patient's Long Term Goal: To go home    Interventions:  - Follow MD orders  - See additional Care Plan goals for specific interventions  Outcome: Progressing  Goal: Patient/Family Short Term Goal  Description: Patient's Short Term Goal: Manage dizziness    Interventions:   - follow MD orders  - See additional Care Plan goals for specific interventions  Outcome: Progressing

## 2024-03-27 NOTE — PROGRESS NOTES
[facility] Hospitalist Progress Note     CC: Hospital Follow up    PCP: Donavan Noonan MD       Assessment/Plan:     Principal Problem:    Hyperkalemia  Patient is a 89 year old male with PMH of CVA, PVD, HL A-fib on Eliquis, s/p DCCV 2/8/24, CAD with severe left main disease, Systolic HF, CKD, DM, who presents with dizziness and abnormal labs.  Admitted for symptomatic bradycardia with hyperkalemia.     Symptomatic bradycardia  - HR in the 30s/40s  - dizzy and orthostatis   - IVF given  - hold BB and diuretics  - Cards consulted    - monitor in tele     Chronic Systolic CHF- EF 15-20%   - euvolemic to dry on admission   - hold lasix 20 mg daily   - hold metoprolol 50 BID  - Lisinopril 2.5 daily ad tolerated      Afib on Eliquis   Secondary Hypercoagulable State   - DCCV 2/8/24  - cardiology consulted  - hold metop and bradycardia  - amio stopped per cards        OZZIE on CKD  - Likely prerenal from hypoperfusion due to bradycardia  - Hold beta-blocker, diuretics  - ACE as tolerated  - IV fluids given  - Continue to trend, watch close   - improving      Hx CVA  - Changed to eliquis alone   - Stopped plavix      PVD/HL  - statin- increased to 40 daily      DM2 A1c 6.6  - on jardiance for CHF standpoint   - hold metformin   - ISS       ACP  - CODE- DNR/full- confirmed with patient  - POA- wife, then daughter  - lives with spouse at independent living     FN:  - IVF: 0.9 given   - Diet: cardiac     DVT Prophy: SCDs Eliquis   Atrophy: Ambulate PT/OT  Lines: Piv     Dispo: pending clinical course     Outpatient records or previous hospital records reviewed.      Further recommendations pending patient's clinical course.  [facility] hospitalist to continue to follow patient while in house     Patient and/or patient's family given opportunity to ask questions and note understanding and agreeing with therapeutic plan as outlined     Thank You,  Aroldo Huang MD     [facility] Hospitalist  Answering Service number:  [phone]     Subjective:     Feels fine while in bed.   HR dropped to 30s overnight.   No CP, SOB, or palpitations.     OBJECTIVE:    Blood pressure 120/44, pulse 61, temperature 98.4 °F (36.9 °C), temperature source Oral, resp. rate 19, height 6' 1\" (1.854 m), weight 141 lb (64 kg), SpO2 99%.    Temp:  [97.6 °F (36.4 °C)-98.4 °F (36.9 °C)] 98.4 °F (36.9 °C)  Pulse:  [29-65] 61  Resp:  [13-22] 19  BP: (107-154)/(44-56) 120/44  SpO2:  [95 %-100 %] 99 %      Intake/Output:    Intake/Output Summary (Last 24 hours) at 3/27/2024 1124  Last data filed at 3/27/2024 0930  Gross per 24 hour   Intake 680 ml   Output 1575 ml   Net -895 ml       Last 3 Weights   03/27/24 0526 141 lb (64 kg)   03/26/24 1459 141 lb 15.6 oz (64.4 kg)   03/26/24 1010 142 lb (64.4 kg)   02/10/24 0500 154 lb 1.6 oz (69.9 kg)   02/09/24 0554 155 lb (70.3 kg)   02/08/24 0434 155 lb 8 oz (70.5 kg)   02/07/24 0522 157 lb 6.4 oz (71.4 kg)   02/06/24 0400 150 lb 1.6 oz (68.1 kg)   02/05/24 0500 152 lb 8 oz (69.2 kg)   02/04/24 0517 154 lb (69.9 kg)   02/03/24 0519 156 lb (70.8 kg)   02/02/24 0450 156 lb 9.6 oz (71 kg)   02/01/24 1341 161 lb 12.8 oz (73.4 kg)   02/01/24 1200 161 lb 12.8 oz (73.4 kg)   02/01/24 1059 160 lb (72.6 kg)   10/18/21 0813 168 lb (76.2 kg)       Exam   General: Alert, no acute distress  HEENT: oral mucosa normal   Neck: non tender, no adenopathy   Lungs: clear to ausculation bilaterally  Heart: Regular rate and rhythm  Abdomen: soft, non tender, non distended   Extremities: No edema  Skin: no new rash, normal color  Neuro: 5/5 strength in bilateral extremities, normal sensation extremities  Psych: appropriate affect     Data Review:       Labs:     Recent Labs   Lab 03/26/24  1028 03/27/24  0646   RBC 3.00* 3.20*   HGB 9.3* 10.0*   HCT 29.2* 30.6*   MCV 97.3 95.6   MCH 31.0 31.3   MCHC 31.8 32.7   RDW 14.1 14.1   NEPRELIM 7.90* 6.14   WBC 10.3 9.0   .0 299.0         Recent Labs   Lab 03/25/24  1119 03/26/24  1028  03/27/24  0646   * 208* 109*   BUN 51* 50* 34*   CREATSERUM 2.25* 2.08* 1.60*   EGFRCR 27* 30* 41*   CA 9.5 9.3 9.6    138 140   K 6.3* 5.8* 5.0    109 112   CO2 25.0 24.0 23.0       Recent Labs   Lab 03/26/24  1028   ALT 14   AST 16   ALB 3.8         Imaging:  No results found.      Meds:      pantoprazole  40 mg Oral QAM AC    insulin aspart  1-7 Units Subcutaneous TID CC    atorvastatin  40 mg Oral Daily    [Held by provider] lisinopril  2.5 mg Oral Daily    apixaban  2.5 mg Oral BID       acetaminophen, polyethylene glycol (PEG 3350), sennosides, bisacodyl, ondansetron, metoclopramide, glucose **OR** glucose **OR** glucose-vitamin C **OR** dextrose **OR** glucose **OR** glucose **OR** glucose-vitamin C

## 2024-03-27 NOTE — PROGRESS NOTES
Cardiology Follow Up  [facility]    Edward Heard Patient Status:  Emergency    1934 MRN [id number]   Location [facility] EMERGENCY DEPARTMENT Attending Manny Mcqueen MD   Hosp Day # 0 PCP Donavan Noonan MD     Reason for Consultation:  Hyperkalemia    History of Present Illness:  Edward Heard is a a(n) 89 year old male with atrial fibrillation, HDL, hx TIA, PVD who presents for hyperkalemia and OZZIE.    Patient initially was admitted in late 2024 in decompensated CHF and afib with RVR that was unresponsive to euvolemia and rate control.  He was cardioverted during that admission after diuresis.  TTE obtained on admission was depressed to 15-20%, but most recent TTE done in March showed significant improvement of LV function to 50-55%. He has been slowly uptitrated on GDMT in clinic. He was recently started on Jardiance and switched from ACE inhibitor to Entresto.  At home, his wife stated that he had not been drinking significant amounts of water.  He was also noted to have dizziness and some diarrhea.  Jardiance was discontinued and a BMP was ordered.  On BMP that was done on 3/25/2024, creatinine was elevated to 2.25 (baseline 1.17) and potassium was 6.3.  Patient was then asked to come to the emergency room.    Here, he received 1 L normal saline IV.  Repeat labs showed potassium 5.8 prior to fluids. Cr 2.08.    Patient also had HR in the 30's on admission, now in the 60's bpm with /56 mmHg.    Assessment/Plan:  Hyperkalemia likely 2/2 OZZIE  Atrial fibrillation, now in NSR. On amio 200mg daily  Chronic systolic CHF, LVEF recovered to 50-55%  OZZIE likely 2/2 volume depletion/over diuresis as he was started on Jardiance and has also been having diarrhea.  HLD  CAD  Bradycardia likely 2/2 hyperkalemia now resolved.    Plan  Hold entresto, MRA. Can discontinue Jardiance. Plan to start low dose ARB when able and renal function is back to baseline. MRA can be held until  patient is seen in clinic.  Continue metoprolol succinate. Held as HR 61 bpm currently.  Discontinue amiodarone 200mg daily  May need lower dose of apixaban 2.5mg BID (wt > 60 kg, Cr > 1.5).  Once OZZIE resolved, can slowly restart GDMT    History:  Past Medical History:   Diagnosis Date    Cataract     Dry eye     Former smoker     Hyperlipidemia     Impaired fasting glucose     Left carotid bruit     Orbital mass     left    Pseudophakia     PVD (peripheral vascular disease) (HCC)     Stroke (cerebrum) (HCC)      Past Surgical History:   Procedure Laterality Date    BEDSIDE CARDIOVERSION  2/9/2024    CAROTID ENDARTERECTOMY Left 1995    CATARACT Bilateral     YAG CAPSULOTOMY - OD - RIGHT EYE Right 06/21/2005     Family History   Problem Relation Age of Onset    Cataracts Mother       reports that he quit smoking about 45 years ago. His smoking use included cigarettes. He has a 30 pack-year smoking history. He has never been exposed to tobacco smoke. He has never used smokeless tobacco. He reports that he does not drink alcohol and does not use drugs.    Allergies:  No Known Allergies    Medications:  Current Facility-Administered Medications on File Prior to Encounter   Medication Dose Route Frequency Provider Last Rate Last Admin    [COMPLETED] fentaNYL (Sublimaze) 50 mcg/mL injection 25 mcg  25 mcg Intravenous Once Mau Grigsby MD   25 mcg at 02/09/24 1000    [COMPLETED] midazolam (Versed) 2 MG/2ML injection 2 mg  2 mg Intravenous Once Mau Grigsby MD   2 mg at 02/09/24 1000    [COMPLETED] benzocaine (Hurricaine/Topex) 20 % mouth spray 1 spray  1 spray Mouth/Throat Once Mau Grigsby MD   Given at 02/09/24 1000    [COMPLETED] methohexital (BrevITAL) 100 mg/10mL IV syringe 10 mg  10 mg Intravenous Once Darryl Vyas MD   10 mg at 02/09/24 1015    [COMPLETED] amiodarone (Cordarone) 150 mg in dextrose 5% 100 mL IV bolus  150 mg Intravenous Once Mau Grigsby  mL/hr at 02/09/24 1456 150 mg at 02/09/24  1456    Followed by    [] amiodarone (Cordarone) 450 mg in dextrose 5% 250 mL infusion  1 mg/min Intravenous Continuous Mau Grigsby MD 33.3 mL/hr at 24 1530 1 mg/min at 24 1530    [COMPLETED] metoprolol tartrate (Lopressor) partial tab 12.5 mg  12.5 mg Oral Once Mau Grigsby MD   12.5 mg at 24 1613    [COMPLETED] sodium zirconium cyclosilicate (Lokelma) oral packet 10 g  10 g Oral Once Long, Alex, DO   10 g at 24 1944    [COMPLETED] iopamidol 76% (ISOVUE-370) injection for power injector  80 mL Intravenous ONCE PRN Long, Alex, DO   80 mL at 24 1639    [COMPLETED] sodium chloride 0.9 % IV bolus 500 mL  500 mL Intravenous Once Mau Grigsby  mL/hr at 24 1040 500 mL at 24 1040    [COMPLETED] heparin (Porcine) 1000 UNIT/ML injection             [COMPLETED] lidocaine PF (Xylocaine-MPF) 2 % injection             [COMPLETED] heparin in sodium chloride 0.9% (Porcine) 2 Units/mL flush bag premix             [COMPLETED] heparin in sodium chloride 0.9% (Porcine) 2 Units/mL flush bag premix             [COMPLETED] Nitroglycerin in D5W 200-5 MCG/ML-% injection             [COMPLETED] midazolam (Versed) 2 MG/2ML injection             [COMPLETED] fentaNYL (Sublimaze) 50 mcg/mL injection             [COMPLETED] aspirin 81 MG chewable tab             [COMPLETED] verapamil (Isoptin) 2.5 mg/mL injection             [COMPLETED] iohexol (Omnipaque) 300 MG/ML injection 100 mL  100 mL Injection ONCE PRN Mau Grigsby MD   85 mL at 24 1638    [COMPLETED] sodium chloride 0.9 % IV bolus 250 mL  250 mL Intravenous Once Dom Valencia  mL/hr at 24 214 250 mL at 24 214    [COMPLETED] digoxin (Lanoxin) 250 MCG/ML injection 250 mcg  250 mcg Intravenous Once Dom Valencia MD   250 mcg at 24 214    [COMPLETED] metoprolol tartrate (Lopressor) tab 25 mg  25 mg Oral Once Darryl Vyas MD   25 mg at 24 1632    [COMPLETED] furosemide (Lasix)  10 mg/mL injection 40 mg  40 mg Intravenous Once Darryl Vyas MD   40 mg at 02/03/24 1158    [COMPLETED] dilTIAZem (cardIZEM) 25 mg/5mL injection 10 mg  10 mg Intravenous Once Chaya Perera MD   10 mg at 02/01/24 1121    [COMPLETED] furosemide (Lasix) 10 mg/mL injection 40 mg  40 mg Intravenous Once Chaya Perera MD   40 mg at 02/01/24 1218    [COMPLETED] Perflutren Lipid Microsphere (DEFINITY) 6.52 MG/ML injection 1.5 mL  1.5 mL Intravenous ONCE PRN Aaliyah Alva MD   1.5 mL at 02/01/24 1544    [COMPLETED] furosemide (Lasix) 10 mg/mL injection 40 mg  40 mg Intravenous Once Mau Grigsby MD   40 mg at 02/01/24 1744     Current Outpatient Medications on File Prior to Encounter   Medication Sig Dispense Refill    lisinopril 2.5 MG Oral Tab Take 1 tablet (2.5 mg total) by mouth daily. 90 tablet 3    metoprolol succinate  MG Oral Tablet 24 Hr Take 1 tablet (100 mg total) by mouth every evening. 90 tablet 3    spironolactone 25 MG Oral Tab Take 1 tablet (25 mg total) by mouth daily. 90 tablet 3    amiodarone 200 MG Oral Tab Take 1 tablet (200 mg total) by mouth daily. 90 tablet 3    furosemide 20 MG Oral Tab Take 1 tablet (20 mg total) by mouth daily. 30 tablet 0    atorvastatin 40 MG Oral Tab Take 1 tablet (40 mg total) by mouth daily. 90 tablet 0    apixaban 5 MG Oral Tab Take 1 tablet (5 mg total) by mouth 2 (two) times daily. 60 tablet 0    metFORMIN 500 MG Oral Tab Take 1 tablet (500 mg total) by mouth daily with breakfast.      MULTIVITAMINS OR one tab daily         Review of Systems:  Constitutional: denies fevers, chills, night sweats  HEENT: denies headache, vision changes, trouble or pain with swallowing  Cardiac: denies chest pain, palpitations, edema  Pulm: denies dyspnea, cough, wheeze  GI: denies n/v, abd pain, diarrhea or constipation  : denies hematuria, dysuria, incontinence  MSK: denies muscle or joint pains  Neuro: denies numbness, weakness, paresthesias  Psych: denies  anxiety, depression  Integument: denies skin rashes or lesions  Heme: denies easy bruising or bleeding  Endo: denies heat/cold intolerance, skin or nail changes      Physical Exam:  Blood pressure 120/44, pulse 61, temperature 98.4 °F (36.9 °C), temperature source Oral, resp. rate 19, height 6' 1\" (1.854 m), weight 141 lb (64 kg), SpO2 99%.  Wt Readings from Last 3 Encounters:   03/27/24 141 lb (64 kg)   02/10/24 154 lb 1.6 oz (69.9 kg)   10/18/21 168 lb (76.2 kg)       General: awake, alert, oriented x 3, no acute distress  HEENT: at/nc, perrl, eomi  Neck: No JVD, carotids 2+ no bruits.  Cardiac: Regular rate and rhythm, S1, S2 normal, no murmur, rub or gallop.  Lungs: Clear without wheezes, rales, rhonchi or dullness.  Normal excursions and effort.  Abdomen: Soft, non-tender, non-distended, normal bowel sounds   Extremities: Without clubbing, cyanosis or edema.  Peripheral pulses are 2+.  Neurologic: Alert and oriented, normal affect.  Psych: normal mood and affect  Skin: Warm and dry.       Laboratories and Data:  Diagnostics:    Labs:   CBC:    Lab Results   Component Value Date    WBC 9.0 03/27/2024    WBC 10.3 03/26/2024    WBC 10.2 02/06/2024     Lab Results   Component Value Date    HEMOGLOBIN 12.7 04/02/2015    HEMOGLOBIN 12.7 10/01/2014    HEMOGLOBIN 12.7 04/02/2014    HGB 10.0 (L) 03/27/2024    HGB 9.3 (L) 03/26/2024    HGB 11.6 (L) 02/06/2024      Lab Results   Component Value Date    .0 03/27/2024    .0 03/26/2024    .0 02/06/2024     BMP:     Lab Results   Component Value Date    GLUCOSE 136 (H) 04/02/2015    GLUCOSE 122 (H) 10/01/2014    GLUCOSE 114 (H) 04/02/2014     Lab Results   Component Value Date    K 5.0 03/27/2024    K 5.8 (H) 03/26/2024    K 6.3 (HH) 03/25/2024     Lab Results   Component Value Date    BUN 34 (H) 03/27/2024    BUN 50 (H) 03/26/2024    BUN 51 (H) 03/25/2024     Lab Results   Component Value Date    CREATSERUM 1.60 (H) 03/27/2024    CREATSERUM 2.08 (H)  03/26/2024    CREATSERUM 2.25 (H) 03/25/2024     Cholesterol:     Lab Results   Component Value Date    CHOLEST 88 02/06/2024    CHOLEST 133.00 10/18/2021    CHOLEST 116.00 10/01/2020     Lab Results   Component Value Date    HDL 32 (L) 02/06/2024    HDL 58 10/18/2021    HDL 54 10/01/2020     Lab Results   Component Value Date    TRIG 60 02/06/2024    TRIG 44.00 10/18/2021    TRIG 42.00 10/01/2020    TRIGLY 61 04/02/2015    TRIGLY 75 10/01/2014    TRIGLY 63 04/02/2014     Lab Results   Component Value Date    LDL 42 02/06/2024    LDL 66 10/18/2021    LDL 54 10/01/2020     Lab Results   Component Value Date    AST 16 03/26/2024    AST 25 02/01/2024    AST 19 10/18/2021     Lab Results   Component Value Date    ALT 14 03/26/2024    ALT 20 02/01/2024    ALT 14 10/18/2021           Mau Grigsby MD  Interventional Cardiology  [facility]

## 2024-03-28 VITALS
HEART RATE: 70 BPM | WEIGHT: 143 LBS | DIASTOLIC BLOOD PRESSURE: 45 MMHG | RESPIRATION RATE: 18 BRPM | OXYGEN SATURATION: 100 % | BODY MASS INDEX: 18.95 KG/M2 | HEIGHT: 73 IN | SYSTOLIC BLOOD PRESSURE: 123 MMHG | TEMPERATURE: 98 F

## 2024-03-28 LAB
ANION GAP SERPL CALC-SCNC: 6 MMOL/L (ref 0–18)
BASOPHILS # BLD AUTO: 0.04 X10(3) UL (ref 0–0.2)
BASOPHILS NFR BLD AUTO: 0.5 %
BUN BLD-MCNC: 25 MG/DL (ref 9–23)
BUN/CREAT SERPL: 17.6 (ref 10–20)
CALCIUM BLD-MCNC: 9.7 MG/DL (ref 8.7–10.4)
CHLORIDE SERPL-SCNC: 110 MMOL/L (ref 98–112)
CO2 SERPL-SCNC: 25 MMOL/L (ref 21–32)
CREAT BLD-MCNC: 1.42 MG/DL
DEPRECATED RDW RBC AUTO: 47.6 FL (ref 35.1–46.3)
EGFRCR SERPLBLD CKD-EPI 2021: 47 ML/MIN/1.73M2 (ref 60–?)
EOSINOPHIL # BLD AUTO: 0.17 X10(3) UL (ref 0–0.7)
EOSINOPHIL NFR BLD AUTO: 2.2 %
ERYTHROCYTE [DISTWIDTH] IN BLOOD BY AUTOMATED COUNT: 14 % (ref 11–15)
GLUCOSE BLD-MCNC: 109 MG/DL (ref 70–99)
GLUCOSE BLDC GLUCOMTR-MCNC: 118 MG/DL (ref 70–99)
GLUCOSE BLDC GLUCOMTR-MCNC: 143 MG/DL (ref 70–99)
HCT VFR BLD AUTO: 32.2 %
HGB BLD-MCNC: 10.5 G/DL
IMM GRANULOCYTES # BLD AUTO: 0.02 X10(3) UL (ref 0–1)
IMM GRANULOCYTES NFR BLD: 0.3 %
LYMPHOCYTES # BLD AUTO: 1.51 X10(3) UL (ref 1–4)
LYMPHOCYTES NFR BLD AUTO: 19.8 %
MAGNESIUM SERPL-MCNC: 2.1 MG/DL (ref 1.6–2.6)
MCH RBC QN AUTO: 30.6 PG (ref 26–34)
MCHC RBC AUTO-ENTMCNC: 32.6 G/DL (ref 31–37)
MCV RBC AUTO: 93.9 FL
MONOCYTES # BLD AUTO: 0.82 X10(3) UL (ref 0.1–1)
MONOCYTES NFR BLD AUTO: 10.8 %
NEUTROPHILS # BLD AUTO: 5.05 X10 (3) UL (ref 1.5–7.7)
NEUTROPHILS # BLD AUTO: 5.05 X10(3) UL (ref 1.5–7.7)
NEUTROPHILS NFR BLD AUTO: 66.4 %
OSMOLALITY SERPL CALC.SUM OF ELEC: 297 MOSM/KG (ref 275–295)
PLATELET # BLD AUTO: 283 10(3)UL (ref 150–450)
POTASSIUM SERPL-SCNC: 4.4 MMOL/L (ref 3.5–5.1)
RBC # BLD AUTO: 3.43 X10(6)UL
SODIUM SERPL-SCNC: 141 MMOL/L (ref 136–145)
WBC # BLD AUTO: 7.6 X10(3) UL (ref 4–11)

## 2024-03-28 PROCEDURE — 85025 COMPLETE CBC W/AUTO DIFF WBC: CPT | Performed by: INTERNAL MEDICINE

## 2024-03-28 PROCEDURE — 82962 GLUCOSE BLOOD TEST: CPT

## 2024-03-28 PROCEDURE — 83735 ASSAY OF MAGNESIUM: CPT | Performed by: INTERNAL MEDICINE

## 2024-03-28 PROCEDURE — 80048 BASIC METABOLIC PNL TOTAL CA: CPT | Performed by: INTERNAL MEDICINE

## 2024-03-28 RX ORDER — METOPROLOL SUCCINATE 50 MG/1
50 TABLET, EXTENDED RELEASE ORAL DAILY
Qty: 30 TABLET | Refills: 0 | Status: SHIPPED | OUTPATIENT
Start: 2024-03-28

## 2024-03-28 RX ORDER — METOPROLOL SUCCINATE 25 MG/1
25 TABLET, EXTENDED RELEASE ORAL
Status: DISCONTINUED | OUTPATIENT
Start: 2024-03-28 | End: 2024-03-28

## 2024-03-28 RX ORDER — PANTOPRAZOLE SODIUM 40 MG/1
40 TABLET, DELAYED RELEASE ORAL
Qty: 30 TABLET | Refills: 0 | Status: SHIPPED | OUTPATIENT
Start: 2024-03-29

## 2024-03-28 NOTE — PLAN OF CARE
No complaints of dizziness. Metoprolol re-added in am. HR's in 60-70's during day. Ambulating in hallways well. Cards cleared for dc. DC order in place. Son to transport home. Current with HH. No other needs. AVS printed and education provided. F/U appointments provided. Call light within reach and safety precautions in place.     Problem: Patient Centered Care  Goal: Patient preferences are identified and integrated in the patient's plan of care  Description: Interventions:  - What would you like us to know as we care for you?   - Provide timely, complete, and accurate information to patient/family  - Incorporate patient and family knowledge, values, beliefs, and cultural backgrounds into the planning and delivery of care  - Encourage patient/family to participate in care and decision-making at the level they choose  - Honor patient and family perspectives and choices  Outcome: Completed     Problem: Diabetes/Glucose Control  Goal: Glucose maintained within prescribed range  Description: INTERVENTIONS:  - Monitor Blood Glucose as ordered  - Assess for signs and symptoms of hyperglycemia and hypoglycemia  - Administer ordered medications to maintain glucose within target range  - Assess barriers to adequate nutritional intake and initiate nutrition consult as needed  - Instruct patient on self management of diabetes  Outcome: Completed     Problem: Patient/Family Goals  Goal: Patient/Family Long Term Goal  Description: Patient's Long Term Goal:     Interventions:  -   - See additional Care Plan goals for specific interventions  Outcome: Completed  Goal: Patient/Family Short Term Goal  Description: Patient's Short Term Goal:     Interventions:   -   - See additional Care Plan goals for specific interventions  Outcome: Completed     Problem: CARDIOVASCULAR - ADULT  Goal: Maintains optimal cardiac output and hemodynamic stability  Description: INTERVENTIONS:  - Monitor vital signs, rhythm, and trends  - Monitor for  bleeding, hypotension and signs of decreased cardiac output  - Evaluate effectiveness of vasoactive medications to optimize hemodynamic stability  - Monitor arterial and/or venous puncture sites for bleeding and/or hematoma  - Assess quality of pulses, skin color and temperature  - Assess for signs of decreased coronary artery perfusion - ex. Angina  - Evaluate fluid balance, assess for edema, trend weights  Outcome: Completed  Goal: Absence of cardiac arrhythmias or at baseline  Description: INTERVENTIONS:  - Continuous cardiac monitoring, monitor vital signs, obtain 12 lead EKG if indicated  - Evaluate effectiveness of antiarrhythmic and heart rate control medications as ordered  - Initiate emergency measures for life threatening arrhythmias  - Monitor electrolytes and administer replacement therapy as ordered  Outcome: Completed     Problem: PAIN - ADULT  Goal: Verbalizes/displays adequate comfort level or patient's stated pain goal  Description: INTERVENTIONS:  - Encourage pt to monitor pain and request assistance  - Assess pain using appropriate pain scale  - Administer analgesics based on type and severity of pain and evaluate response  - Implement non-pharmacological measures as appropriate and evaluate response  - Consider cultural and social influences on pain and pain management  - Manage/alleviate anxiety  - Utilize distraction and/or relaxation techniques  - Monitor for opioid side effects  - Notify MD/LIP if interventions unsuccessful or patient reports new pain  - Anticipate increased pain with activity and pre-medicate as appropriate  Outcome: Completed     Problem: SAFETY ADULT - FALL  Goal: Free from fall injury  Description: INTERVENTIONS:  - Assess pt frequently for physical needs  - Identify cognitive and physical deficits and behaviors that affect risk of falls.  - [place] fall precautions as indicated by assessment.  - Educate pt/family on patient safety including physical limitations  -  Instruct pt to call for assistance with activity based on assessment  - Modify environment to reduce risk of injury  - Provide assistive devices as appropriate  - Consider OT/PT consult to assist with strengthening/mobility  - Encourage toileting schedule  Outcome: Completed

## 2024-03-28 NOTE — PROGRESS NOTES
[facility] Hospitalist Progress Note     CC: Hospital Follow up    PCP: Donavan Noonan MD       Assessment/Plan:     Principal Problem:    Hyperkalemia  Patient is a 89 year old male with PMH of CVA, PVD, HL A-fib on Eliquis, s/p DCCV 2/8/24, CAD with severe left main disease, Systolic HF, CKD, DM, who presents with dizziness and abnormal labs.  Admitted for symptomatic bradycardia with hyperkalemia.     Symptomatic bradycardia  - HR in the 30s/40s  - dizzy and orthostatis   - IVF given  - hdld BB and diuretics  - Cards consulted    - monitor in tele   - will resume BB at lower dose today     Chronic Systolic CHF- EF 15-20%   - euvolemic to dry on admission   - hold lasix 20 mg daily   - hold metoprolol 50 BID  - Lisinopril 2.5 daily ad tolerated   - start metop succinate 25mg with parameters      Afib on Eliquis   Secondary Hypercoagulable State   - DCCV 2/8/24  - cardiology consulted  - bb reintroduced   - amio stopped per cards        OZZIE on CKD  - Likely prerenal from hypoperfusion due to bradycardia  - Hold diuretics  - ACE as tolerated  - IV fluids given  - Continue to trend, watch close   - improving      Hx CVA  - Changed to eliquis alone   - Stopped plavix      PVD/HL  - statin- increased to 40 daily      DM2 A1c 6.6  - on jardiance for CHF standpoint   - hold metformin   - ISS       ACP  - CODE- DNR/full- confirmed with patient  - POA- wife, then daughter  - lives with spouse at independent living     FN:  - IVF: 0.9 given   - Diet: cardiac     DVT Prophy: SCDs Eliquis   Atrophy: Ambulate PT/OT  Lines: Piv     Dispo: pending clinical course     Outpatient records or previous hospital records reviewed.      Further recommendations pending patient's clinical course.  [facility] hospitalist to continue to follow patient while in house     Patient and/or patient's family given opportunity to ask questions and note understanding and agreeing with therapeutic plan as outlined     Thank You,  Aroldo Huang MD     [facility]  [facility] Hospitalist  Answering Service number: [phone]     Subjective:     Was out of bed and walking yesterday and felt well  HR stayed above 60 overnight.   No CP, SOB, or palpitations.     OBJECTIVE:    Blood pressure 110/48, pulse 74, temperature 97.9 °F (36.6 °C), temperature source Oral, resp. rate 18, height 6' 1\" (1.854 m), weight 143 lb (64.9 kg), SpO2 98%.    Temp:  [97.7 °F (36.5 °C)-98.3 °F (36.8 °C)] 97.9 °F (36.6 °C)  Pulse:  [59-76] 74  Resp:  [16-18] 18  BP: (109-125)/(47-55) 110/48  SpO2:  [97 %-98 %] 98 %      Intake/Output:    Intake/Output Summary (Last 24 hours) at 3/28/2024 1321  Last data filed at 3/28/2024 1158  Gross per 24 hour   Intake 1146 ml   Output 2175 ml   Net -1029 ml       Last 3 Weights   03/28/24 0600 143 lb (64.9 kg)   03/27/24 0526 141 lb (64 kg)   03/26/24 1459 141 lb 15.6 oz (64.4 kg)   03/26/24 1010 142 lb (64.4 kg)   02/10/24 0500 154 lb 1.6 oz (69.9 kg)   02/09/24 0554 155 lb (70.3 kg)   02/08/24 0434 155 lb 8 oz (70.5 kg)   02/07/24 0522 157 lb 6.4 oz (71.4 kg)   02/06/24 0400 150 lb 1.6 oz (68.1 kg)   02/05/24 0500 152 lb 8 oz (69.2 kg)   02/04/24 0517 154 lb (69.9 kg)   02/03/24 0519 156 lb (70.8 kg)   02/02/24 0450 156 lb 9.6 oz (71 kg)   02/01/24 1341 161 lb 12.8 oz (73.4 kg)   02/01/24 1200 161 lb 12.8 oz (73.4 kg)   02/01/24 1059 160 lb (72.6 kg)   10/18/21 0813 168 lb (76.2 kg)       Exam   General: Alert, no acute distress  HEENT: oral mucosa normal   Neck: non tender, no adenopathy   Lungs: clear to ausculation bilaterally  Heart: Regular rate and rhythm  Abdomen: soft, non tender, non distended   Extremities: No edema  Skin: no new rash, normal color  Neuro: 5/5 strength in bilateral extremities, normal sensation extremities  Psych: appropriate affect     Data Review:       Labs:     Recent Labs   Lab 03/26/24  1028 03/27/24  0646 03/28/24  0717   RBC 3.00* 3.20* 3.43*   HGB 9.3* 10.0* 10.5*   HCT 29.2* 30.6* 32.2*   MCV 97.3 95.6 93.9   MCH 31.0  31.3 30.6   MCHC 31.8 32.7 32.6   RDW 14.1 14.1 14.0   NEPRELIM 7.90* 6.14 5.05   WBC 10.3 9.0 7.6   .0 299.0 283.0         Recent Labs   Lab 03/26/24  1028 03/27/24  0646 03/28/24  0717   * 109* 109*   BUN 50* 34* 25*   CREATSERUM 2.08* 1.60* 1.42*   EGFRCR 30* 41* 47*   CA 9.3 9.6 9.7    140 141   K 5.8* 5.0 4.4    112 110   CO2 24.0 23.0 25.0       Recent Labs   Lab 03/26/24  1028   ALT 14   AST 16   ALB 3.8         Imaging:  No results found.      Meds:      metoprolol succinate ER  25 mg Oral Daily Beta Blocker    pantoprazole  40 mg Oral QAM AC    insulin aspart  1-7 Units Subcutaneous TID CC    atorvastatin  40 mg Oral Daily    [Held by provider] lisinopril  2.5 mg Oral Daily    apixaban  2.5 mg Oral BID       acetaminophen, polyethylene glycol (PEG 3350), sennosides, bisacodyl, ondansetron, metoclopramide, glucose **OR** glucose **OR** glucose-vitamin C **OR** dextrose **OR** glucose **OR** glucose **OR** glucose-vitamin C

## 2024-03-28 NOTE — PROGRESS NOTES
Cardiology Follow Up  [facility]    Edward Heard Patient Status:  Emergency    1934 MRN [id number]   Location [facility] EMERGENCY DEPARTMENT Attending Manny Mcqueen MD   Hosp Day # 0 PCP Donavan Noonan MD     Reason for Consultation:  Hyperkalemia    History of Present Illness:  Edward Heard is a a(n) 89 year old male with atrial fibrillation, HDL, hx TIA, PVD who presents for hyperkalemia and OZZIE.    Patient initially was admitted in late 2024 in decompensated CHF and afib with RVR that was unresponsive to euvolemia and rate control.  He was cardioverted during that admission after diuresis.  TTE obtained on admission was depressed to 15-20%, but most recent TTE done in March showed significant improvement of LV function to 50-55%. He has been slowly uptitrated on GDMT in clinic. He was recently started on Jardiance and switched from ACE inhibitor to Entresto.  At home, his wife stated that he had not been drinking significant amounts of water.  He was also noted to have dizziness and some diarrhea.  Jardiance was discontinued and a BMP was ordered.  On BMP that was done on 3/25/2024, creatinine was elevated to 2.25 (baseline 1.17) and potassium was 6.3.  Patient was then asked to come to the emergency room.    Here, he received 1 L normal saline IV.  Repeat labs showed potassium 5.8 prior to fluids. Cr 2.08.    Patient also had HR in the 30's on admission, recovered since hyperK was treated.    Assessment/Plan:  Hyperkalemia likely 2/2 OZZIE  Atrial fibrillation, now in NSR. On amio 200mg daily  Chronic systolic CHF, LVEF recovered to 50-55%  OZZIE likely 2/2 volume depletion/over diuresis as he was started on Jardiance and has also been having diarrhea.  HLD  CAD  Bradycardia likely 2/2 hyperkalemia now resolved.    Plan  Hold entresto, MRA. Can discontinue Jardiance. Plan to start low dose ARB when able and renal function is back to baseline. MRA can be held until patient  is seen in clinic.  Continue metoprolol succinate 50mg daily on discharge.  Discontinue amiodarone 200mg daily  Can discharge on eliquis 5mg BID (wt > 60 kg, Cr < 1.5)    Patient to have hospital follow up and BMP in 1 week.    History:  Past Medical History:   Diagnosis Date    Cataract     Dry eye     Former smoker     Hyperlipidemia     Impaired fasting glucose     Left carotid bruit     Orbital mass     left    Pseudophakia     PVD (peripheral vascular disease) (HCC)     Stroke (cerebrum) (HCC)      Past Surgical History:   Procedure Laterality Date    BEDSIDE CARDIOVERSION  2/9/2024    CAROTID ENDARTERECTOMY Left 1995    CATARACT Bilateral     YAG CAPSULOTOMY - OD - RIGHT EYE Right 06/21/2005     Family History   Problem Relation Age of Onset    Cataracts Mother       reports that he quit smoking about 45 years ago. His smoking use included cigarettes. He has a 30 pack-year smoking history. He has never been exposed to tobacco smoke. He has never used smokeless tobacco. He reports that he does not drink alcohol and does not use drugs.    Allergies:  No Known Allergies    Medications:  Current Facility-Administered Medications on File Prior to Encounter   Medication Dose Route Frequency Provider Last Rate Last Admin    [COMPLETED] fentaNYL (Sublimaze) 50 mcg/mL injection 25 mcg  25 mcg Intravenous Once Mau Grigsby MD   25 mcg at 02/09/24 1000    [COMPLETED] midazolam (Versed) 2 MG/2ML injection 2 mg  2 mg Intravenous Once Mau Grigsby MD   2 mg at 02/09/24 1000    [COMPLETED] benzocaine (Hurricaine/Topex) 20 % mouth spray 1 spray  1 spray Mouth/Throat Once Mau Grigsby MD   Given at 02/09/24 1000    [COMPLETED] methohexital (BrevITAL) 100 mg/10mL IV syringe 10 mg  10 mg Intravenous Once Darryl Vyas MD   10 mg at 02/09/24 1015    [COMPLETED] amiodarone (Cordarone) 150 mg in dextrose 5% 100 mL IV bolus  150 mg Intravenous Once Mau Grigsby  mL/hr at 02/09/24 1456 150 mg at 02/09/24 1456     Followed by    [] amiodarone (Cordarone) 450 mg in dextrose 5% 250 mL infusion  1 mg/min Intravenous Continuous Mau Grigsby MD 33.3 mL/hr at 24 1530 1 mg/min at 24 1530    [COMPLETED] metoprolol tartrate (Lopressor) partial tab 12.5 mg  12.5 mg Oral Once Mau Grigsby MD   12.5 mg at 24 1613    [COMPLETED] sodium zirconium cyclosilicate (Lokelma) oral packet 10 g  10 g Oral Once Long, Alex, DO   10 g at 24 1944    [COMPLETED] iopamidol 76% (ISOVUE-370) injection for power injector  80 mL Intravenous ONCE PRN Long, Alex, DO   80 mL at 24 1639    [COMPLETED] sodium chloride 0.9 % IV bolus 500 mL  500 mL Intravenous Once Mau Grigsby  mL/hr at 24 1040 500 mL at 24 1040    [COMPLETED] heparin (Porcine) 1000 UNIT/ML injection             [COMPLETED] lidocaine PF (Xylocaine-MPF) 2 % injection             [COMPLETED] heparin in sodium chloride 0.9% (Porcine) 2 Units/mL flush bag premix             [COMPLETED] heparin in sodium chloride 0.9% (Porcine) 2 Units/mL flush bag premix             [COMPLETED] Nitroglycerin in D5W 200-5 MCG/ML-% injection             [COMPLETED] midazolam (Versed) 2 MG/2ML injection             [COMPLETED] fentaNYL (Sublimaze) 50 mcg/mL injection             [COMPLETED] aspirin 81 MG chewable tab             [COMPLETED] verapamil (Isoptin) 2.5 mg/mL injection             [COMPLETED] iohexol (Omnipaque) 300 MG/ML injection 100 mL  100 mL Injection ONCE PRN Mau Grigsby MD   85 mL at 24 1638    [COMPLETED] sodium chloride 0.9 % IV bolus 250 mL  250 mL Intravenous Once Dom Valencia  mL/hr at 24 214 250 mL at 24 214    [COMPLETED] digoxin (Lanoxin) 250 MCG/ML injection 250 mcg  250 mcg Intravenous Once Dom Valencia MD   250 mcg at 02/05/24 2140    [COMPLETED] metoprolol tartrate (Lopressor) tab 25 mg  25 mg Oral Once Darryl Vyas MD   25 mg at 24 1632    [COMPLETED] furosemide (Lasix) 10 mg/mL  injection 40 mg  40 mg Intravenous Once Darryl Vyas MD   40 mg at 02/03/24 1158    [COMPLETED] dilTIAZem (cardIZEM) 25 mg/5mL injection 10 mg  10 mg Intravenous Once Chaya Perera MD   10 mg at 02/01/24 1121    [COMPLETED] furosemide (Lasix) 10 mg/mL injection 40 mg  40 mg Intravenous Once Chaya Perera MD   40 mg at 02/01/24 1218    [COMPLETED] Perflutren Lipid Microsphere (DEFINITY) 6.52 MG/ML injection 1.5 mL  1.5 mL Intravenous ONCE PRN Aaliyah Alva MD   1.5 mL at 02/01/24 1544    [COMPLETED] furosemide (Lasix) 10 mg/mL injection 40 mg  40 mg Intravenous Once Mau Grigsby MD   40 mg at 02/01/24 1744     Current Outpatient Medications on File Prior to Encounter   Medication Sig Dispense Refill    lisinopril 2.5 MG Oral Tab Take 1 tablet (2.5 mg total) by mouth daily. 90 tablet 3    metoprolol succinate  MG Oral Tablet 24 Hr Take 1 tablet (100 mg total) by mouth every evening. 90 tablet 3    spironolactone 25 MG Oral Tab Take 1 tablet (25 mg total) by mouth daily. 90 tablet 3    amiodarone 200 MG Oral Tab Take 1 tablet (200 mg total) by mouth daily. 90 tablet 3    furosemide 20 MG Oral Tab Take 1 tablet (20 mg total) by mouth daily. 30 tablet 0    atorvastatin 40 MG Oral Tab Take 1 tablet (40 mg total) by mouth daily. 90 tablet 0    apixaban 5 MG Oral Tab Take 1 tablet (5 mg total) by mouth 2 (two) times daily. 60 tablet 0    metFORMIN 500 MG Oral Tab Take 1 tablet (500 mg total) by mouth daily with breakfast.      MULTIVITAMINS OR one tab daily         Review of Systems:  Constitutional: denies fevers, chills, night sweats  HEENT: denies headache, vision changes, trouble or pain with swallowing  Cardiac: denies chest pain, palpitations, edema  Pulm: denies dyspnea, cough, wheeze  GI: denies n/v, abd pain, diarrhea or constipation  : denies hematuria, dysuria, incontinence  MSK: denies muscle or joint pains  Neuro: denies numbness, weakness, paresthesias  Psych: denies anxiety,  depression  Integument: denies skin rashes or lesions  Heme: denies easy bruising or bleeding  Endo: denies heat/cold intolerance, skin or nail changes      Physical Exam:  Blood pressure 123/45, pulse 70, temperature 98 °F (36.7 °C), temperature source Oral, resp. rate 18, height 6' 1\" (1.854 m), weight 143 lb (64.9 kg), SpO2 100%.  Wt Readings from Last 3 Encounters:   03/28/24 143 lb (64.9 kg)   02/10/24 154 lb 1.6 oz (69.9 kg)   10/18/21 168 lb (76.2 kg)       General: awake, alert, oriented x 3, no acute distress  HEENT: at/nc, perrl, eomi  Neck: No JVD, carotids 2+ no bruits.  Cardiac: Regular rate and rhythm, S1, S2 normal, no murmur, rub or gallop.  Lungs: Clear without wheezes, rales, rhonchi or dullness.  Normal excursions and effort.  Abdomen: Soft, non-tender, non-distended, normal bowel sounds   Extremities: Without clubbing, cyanosis or edema.  Peripheral pulses are 2+.  Neurologic: Alert and oriented, normal affect.  Psych: normal mood and affect  Skin: Warm and dry.       Laboratories and Data:  Diagnostics:    Labs:   CBC:    Lab Results   Component Value Date    WBC 7.6 03/28/2024    WBC 9.0 03/27/2024    WBC 10.3 03/26/2024     Lab Results   Component Value Date    HEMOGLOBIN 12.7 04/02/2015    HEMOGLOBIN 12.7 10/01/2014    HEMOGLOBIN 12.7 04/02/2014    HGB 10.5 (L) 03/28/2024    HGB 10.0 (L) 03/27/2024    HGB 9.3 (L) 03/26/2024      Lab Results   Component Value Date    .0 03/28/2024    .0 03/27/2024    .0 03/26/2024     BMP:     Lab Results   Component Value Date    GLUCOSE 136 (H) 04/02/2015    GLUCOSE 122 (H) 10/01/2014    GLUCOSE 114 (H) 04/02/2014     Lab Results   Component Value Date    K 4.4 03/28/2024    K 5.0 03/27/2024    K 5.8 (H) 03/26/2024     Lab Results   Component Value Date    BUN 25 (H) 03/28/2024    BUN 34 (H) 03/27/2024    BUN 50 (H) 03/26/2024     Lab Results   Component Value Date    CREATSERUM 1.42 (H) 03/28/2024    CREATSERUM 1.60 (H) 03/27/2024     CREATSERUM 2.08 (H) 03/26/2024     Cholesterol:     Lab Results   Component Value Date    CHOLEST 88 02/06/2024    CHOLEST 133.00 10/18/2021    CHOLEST 116.00 10/01/2020     Lab Results   Component Value Date    HDL 32 (L) 02/06/2024    HDL 58 10/18/2021    HDL 54 10/01/2020     Lab Results   Component Value Date    TRIG 60 02/06/2024    TRIG 44.00 10/18/2021    TRIG 42.00 10/01/2020    TRIGLY 61 04/02/2015    TRIGLY 75 10/01/2014    TRIGLY 63 04/02/2014     Lab Results   Component Value Date    LDL 42 02/06/2024    LDL 66 10/18/2021    LDL 54 10/01/2020     Lab Results   Component Value Date    AST 16 03/26/2024    AST 25 02/01/2024    AST 19 10/18/2021     Lab Results   Component Value Date    ALT 14 03/26/2024    ALT 20 02/01/2024    ALT 14 10/18/2021           Mau Grigsby MD  Interventional Cardiology  [facility]

## 2024-03-28 NOTE — DISCHARGE SUMMARY
General Medicine Discharge Summary     Patient ID:  Edward Heard  89 year old  4/6/1934    Admit date: 3/26/2024    Discharge date and time: 3/28/24    Attending Physician: Aroldo Huang MD     Consults: IP CONSULT TO SOCIAL WORK    Primary Care Physician: Donavan Noonan MD     Reason for admission: Symptomatic bradycardia     Risk For Readmission: low    Discharge Diagnoses: Hyperkalemia [E87.5]  See Additional Discharge Diagnoses in Hospital Course    Discharged Condition: stable    Follow-up with labs/images appointments: PCP, Cardiology     Risk patient: coordinator contacted for PCP appointment with Karyna Herrera in [place] 4/1/24 at 11:30am    Dr. Noonan no longer with [facility]     Exam  General: Alert, no acute distress  HEENT: oral mucosa normal   Neck: non tender, no adenopathy   Lungs: clear to ausculation bilaterally  Heart: Regular rate and rhythm  Abdomen: soft, non tender, non distended   Extremities: No edema  Skin: no new rash, normal color  Neuro: 5/5 strength in bilateral extremities, normal sensation extremities  Psych: appropriate affect      HPI: Patient is a 89 year old male with PMH of CVA, PVD, HL A-fib on Eliquis, s/p DCCV 2/8/24, CAD with severe left main disease, Systolic HF, CKD, DM, who presents with dizziness and abnormal labs.  Patient was getting routine blood work yesterday and was found to have OZZIE and hyperkalemia.  Patient was sent to the ER and presented with bradycardia but after treating potassium patient's heart rate improved to 60s.  Patient was sent home and returns today for ongoing dizziness.  On arrival patient was found to be in the high 30s and 40s.  Patient was again treated for hyperkalemia with improvement in heart rate back to the 60s.  Patient states that he has been having dizziness for several weeks now and feels more lightheaded with change in position.  Patient denies any shortness of breath or chest pain, no abdominal pain, nausea or vomiting, fever  or chills.  Wife and daughter at bedside state that patient has had decreased appetite and has lost about 10 pounds since his last admission last month.     Hospital Course:   Patient is a 89 year old male with PMH of CVA, PVD, HL A-fib on Eliquis, s/p DCCV 2/8/24, CAD with severe left main disease, Systolic HF, CKD, DM, who presents with dizziness and abnormal labs.  Admitted for symptomatic bradycardia with hyperkalemia and OZZIE. Metop, Lisinopril, lasix, aldactone held with improvement in potasium, Cr, and HR. Seen by Cardiology and will restarted Metop succ 50mg daily.  Will see cardiology in 1 week with repeat blood work to add back diuretics and ARB/ACEi as tolerated. Plans to stop amio and jardiance per cardiology.      Symptomatic bradycardia  - HR in the 30s/40s  - dizzy and orthostatis   - IVF given  - hdld BB and diuretics  - Cards consulted    - monitor in tele   - will resume BB at lower dose  - symptoms resolved and HR improved    Chronic Systolic CHF- EF 15-20%   - euvolemic to dry on admission   - hold lasix 20 mg daily   - hold metoprolol 50 BID  - hold Lisinopril 2.5 daily   - start metop succinate 50mg on dc      Afib on Eliquis   Secondary Hypercoagulable State   - [facility]  - cardiology consulted  - bb reintroduced   - amio stopped per cards        OZZIE on CKD  - Likely prerenal from hypoperfusion due to bradycardia  - Hold diuretics  - hold ACE   - IV fluids given     Hx CVA  - Changed to eliquis alone   - Stopped plavix      PVD/HL  - statin- increased to 40 daily      DM2 A1c 6.6  - on jardiance for CHF standpoint, will stop   - resume metformin     Operative Procedures:      Imaging: No results found.    Disposition: home    Activity: as tolerated   Diet: general   Wound Care: no needs  Code Status: DNAR/Full Treatment  O2: no needs    Home Medication Changes: as below   All discharge medications have been reconciled with current medication list.     Med list     Medication List         START taking these medications      pantoprazole 40 MG Tbec  Commonly known as: Protonix  Take 1 tablet (40 mg total) by mouth every morning before breakfast.  Start taking on: March 29, 2024            CHANGE how you take these medications      apixaban 2.5 MG Tabs  Commonly known as: Eliquis  Take 1 tablet (2.5 mg total) by mouth 2 (two) times daily.  What changed:   medication strength  how much to take     metoprolol succinate ER 50 MG Tb24  Commonly known as: Toprol XL  Take 1 tablet (50 mg total) by mouth daily.  What changed:   medication strength  how much to take  when to take this            CONTINUE taking these medications      atorvastatin 40 MG Tabs  Commonly known as: Lipitor  Take 1 tablet (40 mg total) by mouth daily.     lisinopril 2.5 MG Tabs  Commonly known as: Prinivil; Zestril  Take 1 tablet (2.5 mg total) by mouth daily.     metFORMIN 500 MG Tabs  Commonly known as: Glucophage     MULTIVITAMINS OR            STOP taking these medications      amiodarone 200 MG Tabs  Commonly known as: Pacerone     furosemide 20 MG Tabs  Commonly known as: Lasix     spironolactone 25 MG Tabs  Commonly known as: Aldactone               Where to Get Your Medications        These medications were sent to [facility]/pharmacy #[id number] - [place], [place] - [address] [phone], [phone]  [address] [place] [address]      Phone: [phone]   apixaban 2.5 MG Tabs  metoprolol succinate ER 50 MG Tb24  pantoprazole 40 MG Tbec         FU   Follow-up Information       Donavan Noonan MD Follow up in 3 day(s).    Specialties: Internal Medicine, PEDIATRICS  Contact information:  [address]  [address]  [place] [address] [phone]               Shawnee Eddy APN Follow up in 1 week(s).    Specialty: Nurse Practitioner  Contact information:  [address] [facility]  [address]  [place] [address] [phone]                             DC instructions:      Other Discharge Instructions:          Keep all follow up appointments and continue current medications that have been changed.         Patient had opportunity to ask questions and state understand and agree with therapeutic plan as outlined    Thank You,    Aroldo Huang M.D.  [facility]

## 2024-03-28 NOTE — PLAN OF CARE
No acute changes. Pt VS stable. HR 70s throughout night. Complains of no pain. Call light within reach, fall precautions in place.    Problem: Patient Centered Care  Goal: Patient preferences are identified and integrated in the patient's plan of care  Description: Interventions:  - What would you like us to know as we care for you?   - Provide timely, complete, and accurate information to patient/family  - Incorporate patient and family knowledge, values, beliefs, and cultural backgrounds into the planning and delivery of care  - Encourage patient/family to participate in care and decision-making at the level they choose  - Honor patient and family perspectives and choices  Outcome: Progressing     Problem: Diabetes/Glucose Control  Goal: Glucose maintained within prescribed range  Description: INTERVENTIONS:  - Monitor Blood Glucose as ordered  - Assess for signs and symptoms of hyperglycemia and hypoglycemia  - Administer ordered medications to maintain glucose within target range  - Assess barriers to adequate nutritional intake and initiate nutrition consult as needed  - Instruct patient on self management of diabetes  Outcome: Progressing     Problem: Patient/Family Goals  Goal: Patient/Family Long Term Goal  Description: Patient's Long Term Goal:   Interventions:  -   - See additional Care Plan goals for specific interventions  Outcome: Progressing  Goal: Patient/Family Short Term Goal  Description: Patient's Short Term Goal:     Interventions:   - - See additional Care Plan goals for specific interventions  Outcome: Progressing     Problem: CARDIOVASCULAR - ADULT  Goal: Maintains optimal cardiac output and hemodynamic stability  Description: INTERVENTIONS:  - Monitor vital signs, rhythm, and trends  - Monitor for bleeding, hypotension and signs of decreased cardiac output  - Evaluate effectiveness of vasoactive medications to optimize hemodynamic stability  - Monitor arterial and/or venous puncture sites for  bleeding and/or hematoma  - Assess quality of pulses, skin color and temperature  - Assess for signs of decreased coronary artery perfusion - ex. Angina  - Evaluate fluid balance, assess for edema, trend weights  Outcome: Progressing  Goal: Absence of cardiac arrhythmias or at baseline  Description: INTERVENTIONS:  - Continuous cardiac monitoring, monitor vital signs, obtain 12 lead EKG if indicated  - Evaluate effectiveness of antiarrhythmic and heart rate control medications as ordered  - Initiate emergency measures for life threatening arrhythmias  - Monitor electrolytes and administer replacement therapy as ordered  Outcome: Progressing     Problem: PAIN - ADULT  Goal: Verbalizes/displays adequate comfort level or patient's stated pain goal  Description: INTERVENTIONS:  - Encourage pt to monitor pain and request assistance  - Assess pain using appropriate pain scale  - Administer analgesics based on type and severity of pain and evaluate response  - Implement non-pharmacological measures as appropriate and evaluate response  - Consider cultural and social influences on pain and pain management  - Manage/alleviate anxiety  - Utilize distraction and/or relaxation techniques  - Monitor for opioid side effects  - Notify MD/LIP if interventions unsuccessful or patient reports new pain  - Anticipate increased pain with activity and pre-medicate as appropriate  Outcome: Progressing     Problem: SAFETY ADULT - FALL  Goal: Free from fall injury  Description: INTERVENTIONS:  - Assess pt frequently for physical needs  - Identify cognitive and physical deficits and behaviors that affect risk of falls.  - Alverda fall precautions as indicated by assessment.  - Educate pt/family on patient safety including physical limitations  - Instruct pt to call for assistance with activity based on assessment  - Modify environment to reduce risk of injury  - Provide assistive devices as appropriate  - Consider OT/PT consult to assist  with strengthening/mobility  - Encourage toileting schedule  Outcome: Progressing

## 2024-03-28 NOTE — CM/SW NOTE
03/28/24 1500   Discharge disposition   Expected discharge disposition Home-Health   Post Acute Care Provider Residential   Discharge transportation Private car       Per RN caesar Thomson is cleared for DC today.  confirmed he is set up w/ HH and cleared from SW stand point. Per RN, pt's son will transport home.    Bruna w/ Residential  notified of plan for DC.    PLAN: Mesha GARVIN w/ Residential , son to transport home          Libra Ladd, MSW, LSW [id number]

## 2024-03-29 ENCOUNTER — PATIENT OUTREACH (OUTPATIENT)
Dept: CASE MANAGEMENT | Age: 89
End: 2024-03-29

## 2024-03-29 NOTE — PROGRESS NOTES
TCM chart review.  No TCM as patient follows with outside [facility] PCP.  Encounter closing.

## 2024-08-11 ENCOUNTER — HOSPITAL ENCOUNTER (INPATIENT)
Age: 89
LOS: 2 days | Discharge: HOME | DRG: 871 | End: 2024-08-14
Attending: STUDENT IN AN ORGANIZED HEALTH CARE EDUCATION/TRAINING PROGRAM | Admitting: INTERNAL MEDICINE

## 2024-08-11 ENCOUNTER — APPOINTMENT (OUTPATIENT)
Dept: GENERAL RADIOLOGY | Age: 89
DRG: 871 | End: 2024-08-11
Attending: STUDENT IN AN ORGANIZED HEALTH CARE EDUCATION/TRAINING PROGRAM

## 2024-08-11 DIAGNOSIS — E86.1 HYPOTENSION DUE TO HYPOVOLEMIA: ICD-10-CM

## 2024-08-11 DIAGNOSIS — U07.1 COVID-19 VIRUS INFECTION: Primary | ICD-10-CM

## 2024-08-11 LAB
ALBUMIN SERPL-MCNC: 3.4 G/DL (ref 3.6–5.1)
ALBUMIN/GLOB SERPL: 1 {RATIO} (ref 1–2.4)
ALP SERPL-CCNC: 62 UNITS/L (ref 45–117)
ALT SERPL-CCNC: 24 UNITS/L
ANION GAP SERPL CALC-SCNC: 9 MMOL/L (ref 7–19)
APPEARANCE UR: CLEAR
APTT PPP: 30 SEC (ref 22–32)
AST SERPL-CCNC: 40 UNITS/L
BACTERIA #/AREA URNS HPF: ABNORMAL /HPF
BASOPHILS # BLD: 0 K/MCL (ref 0–0.3)
BASOPHILS NFR BLD: 0 %
BILIRUB SERPL-MCNC: 0.6 MG/DL (ref 0.2–1)
BILIRUB UR QL STRIP: NEGATIVE
BUN SERPL-MCNC: 35 MG/DL (ref 6–20)
BUN/CREAT SERPL: 25 (ref 7–25)
CALCIUM SERPL-MCNC: 8.7 MG/DL (ref 8.4–10.2)
CHLORIDE SERPL-SCNC: 107 MMOL/L (ref 97–110)
CO2 SERPL-SCNC: 25 MMOL/L (ref 21–32)
COLOR UR: ABNORMAL
CREAT SERPL-MCNC: 1.4 MG/DL (ref 0.67–1.17)
DEPRECATED RDW RBC: 45.4 FL (ref 39–50)
EGFRCR SERPLBLD CKD-EPI 2021: 48 ML/MIN/{1.73_M2}
EOSINOPHIL # BLD: 0 K/MCL (ref 0–0.5)
EOSINOPHIL NFR BLD: 0 %
ERYTHROCYTE [DISTWIDTH] IN BLOOD: 12.1 % (ref 11–15)
FASTING DURATION TIME PATIENT: ABNORMAL H
FLUAV RNA RESP QL NAA+PROBE: NOT DETECTED
FLUBV RNA RESP QL NAA+PROBE: NOT DETECTED
GLOBULIN SER-MCNC: 3.5 G/DL (ref 2–4)
GLUCOSE BLDC GLUCOMTR-MCNC: 112 MG/DL (ref 70–99)
GLUCOSE SERPL-MCNC: 133 MG/DL (ref 70–99)
GLUCOSE UR STRIP-MCNC: >1000 MG/DL
HCT VFR BLD CALC: 29.3 % (ref 39–51)
HGB BLD-MCNC: 9.6 G/DL (ref 13–17)
HGB UR QL STRIP: ABNORMAL
HYALINE CASTS #/AREA URNS LPF: ABNORMAL /LPF
IMM GRANULOCYTES # BLD AUTO: 0 K/MCL (ref 0–0.2)
IMM GRANULOCYTES # BLD: 1 %
INR PPP: 1.2
KETONES UR STRIP-MCNC: NEGATIVE MG/DL
LACTATE BLDV-SCNC: 0.7 MMOL/L (ref 0–2)
LACTATE BLDV-SCNC: 1.1 MMOL/L (ref 0–2)
LEUKOCYTE ESTERASE UR QL STRIP: NEGATIVE
LYMPHOCYTES # BLD: 0.6 K/MCL (ref 1–4)
LYMPHOCYTES NFR BLD: 10 %
MCH RBC QN AUTO: 33.2 PG (ref 26–34)
MCHC RBC AUTO-ENTMCNC: 32.8 G/DL (ref 32–36.5)
MCV RBC AUTO: 101.4 FL (ref 78–100)
MONOCYTES # BLD: 0.7 K/MCL (ref 0.3–0.9)
MONOCYTES NFR BLD: 11 %
NEUTROPHILS # BLD: 4.5 K/MCL (ref 1.8–7.7)
NEUTROPHILS NFR BLD: 78 %
NITRITE UR QL STRIP: NEGATIVE
NRBC BLD MANUAL-RTO: 0 /100 WBC
NT-PROBNP SERPL-MCNC: 1983 PG/ML
PH UR STRIP: 5 [PH] (ref 5–7)
PLATELET # BLD AUTO: 123 K/MCL (ref 140–450)
POTASSIUM SERPL-SCNC: 4.7 MMOL/L (ref 3.4–5.1)
PROCALCITONIN SERPL IA-MCNC: 0.08 NG/ML
PROT SERPL-MCNC: 6.9 G/DL (ref 6.4–8.2)
PROT UR STRIP-MCNC: NEGATIVE MG/DL
PROTHROMBIN TIME: 12.3 SEC (ref 9.7–11.8)
RAINBOW EXTRA TUBES HOLD SPECIMEN: NORMAL
RBC # BLD: 2.89 MIL/MCL (ref 4.5–5.9)
RBC #/AREA URNS HPF: ABNORMAL /HPF
RSV AG NPH QL IA.RAPID: NOT DETECTED
SARS-COV-2 N GENE CT SPEC QN NAA N2: 13.7
SARS-COV-2 RNA RESP QL NAA+PROBE: DETECTED
SERVICE CMNT-IMP: ABNORMAL
SODIUM SERPL-SCNC: 136 MMOL/L (ref 135–145)
SP GR UR STRIP: 1.02 (ref 1–1.03)
SQUAMOUS #/AREA URNS HPF: ABNORMAL /HPF
TROPONIN I SERPL DL<=0.01 NG/ML-MCNC: 14 NG/L
UROBILINOGEN UR STRIP-MCNC: 0.2 MG/DL
WBC # BLD: 5.7 K/MCL (ref 4.2–11)
WBC #/AREA URNS HPF: ABNORMAL /HPF

## 2024-08-11 PROCEDURE — 87040 BLOOD CULTURE FOR BACTERIA: CPT | Performed by: STUDENT IN AN ORGANIZED HEALTH CARE EDUCATION/TRAINING PROGRAM

## 2024-08-11 PROCEDURE — 93010 ELECTROCARDIOGRAM REPORT: CPT | Performed by: INTERNAL MEDICINE

## 2024-08-11 PROCEDURE — 83880 ASSAY OF NATRIURETIC PEPTIDE: CPT | Performed by: STUDENT IN AN ORGANIZED HEALTH CARE EDUCATION/TRAINING PROGRAM

## 2024-08-11 PROCEDURE — 84484 ASSAY OF TROPONIN QUANT: CPT | Performed by: STUDENT IN AN ORGANIZED HEALTH CARE EDUCATION/TRAINING PROGRAM

## 2024-08-11 PROCEDURE — 85610 PROTHROMBIN TIME: CPT | Performed by: STUDENT IN AN ORGANIZED HEALTH CARE EDUCATION/TRAINING PROGRAM

## 2024-08-11 PROCEDURE — 83605 ASSAY OF LACTIC ACID: CPT | Performed by: STUDENT IN AN ORGANIZED HEALTH CARE EDUCATION/TRAINING PROGRAM

## 2024-08-11 PROCEDURE — G0378 HOSPITAL OBSERVATION PER HR: HCPCS

## 2024-08-11 PROCEDURE — 10002807 HB RX 258: Performed by: STUDENT IN AN ORGANIZED HEALTH CARE EDUCATION/TRAINING PROGRAM

## 2024-08-11 PROCEDURE — 71045 X-RAY EXAM CHEST 1 VIEW: CPT

## 2024-08-11 PROCEDURE — 10004651 HB RX, NO CHARGE ITEM: Performed by: STUDENT IN AN ORGANIZED HEALTH CARE EDUCATION/TRAINING PROGRAM

## 2024-08-11 PROCEDURE — 0241U COVID/FLU/RSV PANEL: CPT | Performed by: STUDENT IN AN ORGANIZED HEALTH CARE EDUCATION/TRAINING PROGRAM

## 2024-08-11 PROCEDURE — 85730 THROMBOPLASTIN TIME PARTIAL: CPT | Performed by: STUDENT IN AN ORGANIZED HEALTH CARE EDUCATION/TRAINING PROGRAM

## 2024-08-11 PROCEDURE — 87086 URINE CULTURE/COLONY COUNT: CPT | Performed by: STUDENT IN AN ORGANIZED HEALTH CARE EDUCATION/TRAINING PROGRAM

## 2024-08-11 PROCEDURE — 99291 CRITICAL CARE FIRST HOUR: CPT

## 2024-08-11 PROCEDURE — 36415 COLL VENOUS BLD VENIPUNCTURE: CPT

## 2024-08-11 PROCEDURE — 85025 COMPLETE CBC W/AUTO DIFF WBC: CPT | Performed by: STUDENT IN AN ORGANIZED HEALTH CARE EDUCATION/TRAINING PROGRAM

## 2024-08-11 PROCEDURE — 96360 HYDRATION IV INFUSION INIT: CPT

## 2024-08-11 PROCEDURE — 81001 URINALYSIS AUTO W/SCOPE: CPT | Performed by: STUDENT IN AN ORGANIZED HEALTH CARE EDUCATION/TRAINING PROGRAM

## 2024-08-11 PROCEDURE — 93005 ELECTROCARDIOGRAM TRACING: CPT | Performed by: STUDENT IN AN ORGANIZED HEALTH CARE EDUCATION/TRAINING PROGRAM

## 2024-08-11 PROCEDURE — 80053 COMPREHEN METABOLIC PANEL: CPT | Performed by: STUDENT IN AN ORGANIZED HEALTH CARE EDUCATION/TRAINING PROGRAM

## 2024-08-11 PROCEDURE — 82962 GLUCOSE BLOOD TEST: CPT

## 2024-08-11 PROCEDURE — 84145 PROCALCITONIN (PCT): CPT | Performed by: STUDENT IN AN ORGANIZED HEALTH CARE EDUCATION/TRAINING PROGRAM

## 2024-08-11 RX ORDER — MONTELUKAST SODIUM 10 MG/1
10 TABLET ORAL NIGHTLY
COMMUNITY

## 2024-08-11 RX ORDER — 0.9 % SODIUM CHLORIDE 0.9 %
2 VIAL (ML) INJECTION EVERY 12 HOURS SCHEDULED
Status: DISCONTINUED | OUTPATIENT
Start: 2024-08-11 | End: 2024-08-14 | Stop reason: HOSPADM

## 2024-08-11 RX ORDER — ACETAMINOPHEN 650 MG/1
650 SUPPOSITORY RECTAL EVERY 4 HOURS PRN
Status: DISCONTINUED | OUTPATIENT
Start: 2024-08-11 | End: 2024-08-14 | Stop reason: HOSPADM

## 2024-08-11 RX ORDER — ATORVASTATIN CALCIUM 40 MG/1
40 TABLET, FILM COATED ORAL DAILY
COMMUNITY

## 2024-08-11 RX ORDER — ACETAMINOPHEN 500 MG
1000 TABLET ORAL ONCE
Status: COMPLETED | OUTPATIENT
Start: 2024-08-11 | End: 2024-08-11

## 2024-08-11 RX ORDER — SODIUM CHLORIDE 9 MG/ML
INJECTION, SOLUTION INTRAVENOUS CONTINUOUS
Status: DISCONTINUED | OUTPATIENT
Start: 2024-08-11 | End: 2024-08-12

## 2024-08-11 RX ORDER — ACETAMINOPHEN 325 MG/1
650 TABLET ORAL EVERY 4 HOURS PRN
Status: DISCONTINUED | OUTPATIENT
Start: 2024-08-11 | End: 2024-08-14 | Stop reason: HOSPADM

## 2024-08-11 RX ORDER — ONDANSETRON 2 MG/ML
4 INJECTION INTRAMUSCULAR; INTRAVENOUS EVERY 12 HOURS PRN
Status: DISCONTINUED | OUTPATIENT
Start: 2024-08-11 | End: 2024-08-14 | Stop reason: HOSPADM

## 2024-08-11 RX ORDER — ONDANSETRON 4 MG/1
4 TABLET, ORALLY DISINTEGRATING ORAL EVERY 12 HOURS PRN
Status: DISCONTINUED | OUTPATIENT
Start: 2024-08-11 | End: 2024-08-14 | Stop reason: HOSPADM

## 2024-08-11 RX ORDER — HEPARIN SODIUM 5000 [USP'U]/ML
5000 INJECTION, SOLUTION INTRAVENOUS; SUBCUTANEOUS EVERY 8 HOURS SCHEDULED
Status: DISCONTINUED | OUTPATIENT
Start: 2024-08-11 | End: 2024-08-12

## 2024-08-11 RX ORDER — PANTOPRAZOLE SODIUM 40 MG/1
40 TABLET, DELAYED RELEASE ORAL DAILY
COMMUNITY

## 2024-08-11 RX ORDER — SODIUM CHLORIDE AND POTASSIUM CHLORIDE 150; 900 MG/100ML; MG/100ML
INJECTION, SOLUTION INTRAVENOUS CONTINUOUS
Status: DISCONTINUED | OUTPATIENT
Start: 2024-08-11 | End: 2024-08-11

## 2024-08-11 RX ORDER — SACUBITRIL AND VALSARTAN 24; 26 MG/1; MG/1
1 TABLET, FILM COATED ORAL 2 TIMES DAILY
COMMUNITY

## 2024-08-11 RX ORDER — METOPROLOL SUCCINATE 50 MG/1
50 TABLET, EXTENDED RELEASE ORAL DAILY
COMMUNITY

## 2024-08-11 RX ADMIN — SODIUM CHLORIDE: 9 INJECTION, SOLUTION INTRAVENOUS at 19:01

## 2024-08-11 RX ADMIN — SODIUM CHLORIDE 500 ML: 0.9 INJECTION, SOLUTION INTRAVENOUS at 18:04

## 2024-08-11 RX ADMIN — ACETAMINOPHEN 1000 MG: 500 TABLET ORAL at 14:54

## 2024-08-11 RX ADMIN — SODIUM CHLORIDE 1000 ML: 9 INJECTION, SOLUTION INTRAVENOUS at 15:38

## 2024-08-11 RX ADMIN — SODIUM CHLORIDE 500 ML: 0.9 INJECTION, SOLUTION INTRAVENOUS at 16:54

## 2024-08-11 SDOH — ECONOMIC STABILITY: INCOME INSECURITY: IN THE PAST 12 MONTHS, HAS THE ELECTRIC, GAS, OIL, OR WATER COMPANY THREATENED TO SHUT OFF SERVICE IN YOUR HOME?: NO

## 2024-08-11 SDOH — ECONOMIC STABILITY: HOUSING INSECURITY: WHAT IS YOUR LIVING SITUATION TODAY?: I HAVE A STEADY PLACE TO LIVE

## 2024-08-11 SDOH — ECONOMIC STABILITY: HOUSING INSECURITY: WHAT IS YOUR LIVING SITUATION TODAY?: SPOUSE

## 2024-08-11 SDOH — HEALTH STABILITY: GENERAL
BECAUSE OF A PHYSICAL, MENTAL, OR EMOTIONAL CONDITION, DO YOU HAVE SERIOUS DIFFICULTY CONCENTRATING, REMEMBERING OR MAKING DECISIONS?: NO

## 2024-08-11 SDOH — SOCIAL STABILITY: SOCIAL NETWORK: SUPPORT SYSTEMS: FAMILY MEMBERS

## 2024-08-11 SDOH — HEALTH STABILITY: PHYSICAL HEALTH: DO YOU HAVE SERIOUS DIFFICULTY WALKING OR CLIMBING STAIRS?: NO

## 2024-08-11 SDOH — ECONOMIC STABILITY: HOUSING INSECURITY: WHAT IS YOUR LIVING SITUATION TODAY?: SUPPORTED INDEPENDENT

## 2024-08-11 SDOH — ECONOMIC STABILITY: HOUSING INSECURITY: DO YOU HAVE PROBLEMS WITH ANY OF THE FOLLOWING?: NONE OF THE ABOVE

## 2024-08-11 SDOH — ECONOMIC STABILITY: FOOD INSECURITY: WITHIN THE PAST 12 MONTHS, THE FOOD YOU BOUGHT JUST DIDN'T LAST AND YOU DIDN'T HAVE MONEY TO GET MORE.: NEVER TRUE

## 2024-08-11 SDOH — ECONOMIC STABILITY: GENERAL: WOULD YOU LIKE HELP WITH ANY OF THE FOLLOWING NEEDS?: I DON'T WANT HELP WITH ANY OF THESE

## 2024-08-11 SDOH — SOCIAL STABILITY: SOCIAL INSECURITY: HOW OFTEN DOES ANYONE, INCLUDING FAMILY AND FRIENDS, THREATEN YOU WITH HARM?: NEVER

## 2024-08-11 SDOH — ECONOMIC STABILITY: TRANSPORTATION INSECURITY
IN THE PAST 12 MONTHS, HAS LACK OF RELIABLE TRANSPORTATION KEPT YOU FROM MEDICAL APPOINTMENTS, MEETINGS, WORK OR FROM GETTING THINGS NEEDED FOR DAILY LIVING?: NO

## 2024-08-11 SDOH — SOCIAL STABILITY: SOCIAL NETWORK
HOW OFTEN DO YOU SEE OR TALK TO PEOPLE THAT YOU CARE ABOUT AND FEEL CLOSE TO? (FOR EXAMPLE: TALKING TO FRIENDS ON THE PHONE, VISITING FRIENDS OR FAMILY, GOING TO CHURCH OR CLUB MEETINGS): 5 OR MORE TIMES A WEEK

## 2024-08-11 SDOH — HEALTH STABILITY: PHYSICAL HEALTH: DO YOU HAVE DIFFICULTY DRESSING OR BATHING?: NO

## 2024-08-11 SDOH — SOCIAL STABILITY: SOCIAL INSECURITY: HOW OFTEN DOES ANYONE, INCLUDING FAMILY AND FRIENDS, INSULT OR TALK DOWN TO YOU?: NEVER

## 2024-08-11 SDOH — ECONOMIC STABILITY: GENERAL

## 2024-08-11 SDOH — HEALTH STABILITY: GENERAL: BECAUSE OF A PHYSICAL, MENTAL, OR EMOTIONAL CONDITION, DO YOU HAVE DIFFICULTY DOING ERRANDS ALONE?: NO

## 2024-08-11 SDOH — ECONOMIC STABILITY: HOUSING INSECURITY: DO YOU HAVE PROBLEMS WITH ANY OF THE FOLLOWING?: PATIENT UNABLE TO ANSWER

## 2024-08-11 SDOH — SOCIAL STABILITY: SOCIAL INSECURITY: HOW OFTEN DOES ANYONE, INCLUDING FAMILY AND FRIENDS, PHYSICALLY HURT YOU?: NEVER

## 2024-08-11 SDOH — SOCIAL STABILITY: SOCIAL INSECURITY: HOW OFTEN DOES ANYONE, INCLUDING FAMILY AND FRIENDS, SCREAM OR CURSE AT YOU?: NEVER

## 2024-08-11 ASSESSMENT — LIFESTYLE VARIABLES
HOW OFTEN DO YOU HAVE 6 OR MORE DRINKS ON ONE OCCASION: NEVER
AUDIT-C TOTAL SCORE: 0
HOW MANY STANDARD DRINKS CONTAINING ALCOHOL DO YOU HAVE ON A TYPICAL DAY: 0,1 OR 2
HOW OFTEN DO YOU HAVE A DRINK CONTAINING ALCOHOL: NEVER
ALCOHOL_USE_STATUS: NO OR LOW RISK WITH VALIDATED TOOL

## 2024-08-11 ASSESSMENT — ORIENTATION MEMORY CONCENTRATION TEST (OMCT)
SAY THE MONTHS IN REVERSE ORDER STARTING WITH LAST MONTH: 1 ERROR
WHAT YEAR IS IT NOW (MUST BE EXACT): CORRECT
WHAT TIME IS IT (NO WATCH OR CLOCK): CORRECT
OMCT SCORE: 2
COUNT BACKWARDS FROM 20 TO 1: CORRECT
REPEAT THE NAME AND ADDRESS I ASKED YOU TO REMEMBER: CORRECT
WHAT MONTH IS IT NOW: CORRECT
OMCT INTERPRETATION: 0-6: NO SIGNIFICANT IMPAIRMENT

## 2024-08-11 ASSESSMENT — PATIENT HEALTH QUESTIONNAIRE - PHQ9
IS PATIENT ABLE TO COMPLETE PHQ2 OR PHQ9: YES
CLINICAL INTERPRETATION OF PHQ2 SCORE: NO FURTHER SCREENING NEEDED
2. FEELING DOWN, DEPRESSED OR HOPELESS: NOT AT ALL
SUM OF ALL RESPONSES TO PHQ9 QUESTIONS 1 AND 2: 0
SUM OF ALL RESPONSES TO PHQ9 QUESTIONS 1 AND 2: 0
1. LITTLE INTEREST OR PLEASURE IN DOING THINGS: NOT AT ALL

## 2024-08-11 ASSESSMENT — ENCOUNTER SYMPTOMS: COUGH: 1

## 2024-08-11 ASSESSMENT — ACTIVITIES OF DAILY LIVING (ADL)
ADL_SCORE: 12
ADL_SHORT_OF_BREATH: YES
RECENT_DECLINE_ADL: YES, ACUTE ILLNESS WITHOUT THERAPY NEEDS
ADL_BEFORE_ADMISSION: INDEPENDENT

## 2024-08-11 ASSESSMENT — COLUMBIA-SUICIDE SEVERITY RATING SCALE - C-SSRS
1. WITHIN THE PAST MONTH, HAVE YOU WISHED YOU WERE DEAD OR WISHED YOU COULD GO TO SLEEP AND NOT WAKE UP?: NO
IS THE PATIENT ABLE TO COMPLETE C-SSRS: YES
6. HAVE YOU EVER DONE ANYTHING, STARTED TO DO ANYTHING, OR PREPARED TO DO ANYTHING TO END YOUR LIFE?: NO
2. HAVE YOU ACTUALLY HAD ANY THOUGHTS OF KILLING YOURSELF?: NO

## 2024-08-11 ASSESSMENT — PAIN SCALES - GENERAL
PAINLEVEL_OUTOF10: 0

## 2024-08-12 LAB
ALBUMIN SERPL-MCNC: 2.7 G/DL (ref 3.6–5.1)
ALBUMIN SERPL-MCNC: 2.8 G/DL (ref 3.6–5.1)
ALBUMIN/GLOB SERPL: 0.9 {RATIO} (ref 1–2.4)
ALBUMIN/GLOB SERPL: 1 {RATIO} (ref 1–2.4)
ALP SERPL-CCNC: 52 UNITS/L (ref 45–117)
ALP SERPL-CCNC: 53 UNITS/L (ref 45–117)
ALT SERPL-CCNC: 20 UNITS/L
ALT SERPL-CCNC: 21 UNITS/L
ANION GAP SERPL CALC-SCNC: 10 MMOL/L (ref 7–19)
ANION GAP SERPL CALC-SCNC: 6 MMOL/L (ref 7–19)
AST SERPL-CCNC: 25 UNITS/L
AST SERPL-CCNC: 27 UNITS/L
ATRIAL RATE (BPM): 93
BACTERIA UR CULT: NORMAL
BASOPHILS # BLD: 0 K/MCL (ref 0–0.3)
BASOPHILS NFR BLD: 0 %
BILIRUB SERPL-MCNC: 0.4 MG/DL (ref 0.2–1)
BILIRUB SERPL-MCNC: 0.4 MG/DL (ref 0.2–1)
BUN SERPL-MCNC: 29 MG/DL (ref 6–20)
BUN SERPL-MCNC: 29 MG/DL (ref 6–20)
BUN/CREAT SERPL: 22 (ref 7–25)
BUN/CREAT SERPL: 24 (ref 7–25)
CALCIUM SERPL-MCNC: 8.3 MG/DL (ref 8.4–10.2)
CALCIUM SERPL-MCNC: 8.4 MG/DL (ref 8.4–10.2)
CHLORIDE SERPL-SCNC: 113 MMOL/L (ref 97–110)
CHLORIDE SERPL-SCNC: 115 MMOL/L (ref 97–110)
CO2 SERPL-SCNC: 23 MMOL/L (ref 21–32)
CO2 SERPL-SCNC: 25 MMOL/L (ref 21–32)
CREAT SERPL-MCNC: 1.22 MG/DL (ref 0.67–1.17)
CREAT SERPL-MCNC: 1.33 MG/DL (ref 0.67–1.17)
DEPRECATED RDW RBC: 46.5 FL (ref 39–50)
EGFRCR SERPLBLD CKD-EPI 2021: 51 ML/MIN/{1.73_M2}
EGFRCR SERPLBLD CKD-EPI 2021: 56 ML/MIN/{1.73_M2}
EOSINOPHIL # BLD: 0 K/MCL (ref 0–0.5)
EOSINOPHIL NFR BLD: 1 %
ERYTHROCYTE [DISTWIDTH] IN BLOOD: 12.5 % (ref 11–15)
FASTING DURATION TIME PATIENT: ABNORMAL H
FASTING DURATION TIME PATIENT: ABNORMAL H
GLOBULIN SER-MCNC: 2.9 G/DL (ref 2–4)
GLOBULIN SER-MCNC: 2.9 G/DL (ref 2–4)
GLUCOSE BLDC GLUCOMTR-MCNC: 125 MG/DL (ref 70–99)
GLUCOSE BLDC GLUCOMTR-MCNC: 144 MG/DL (ref 70–99)
GLUCOSE BLDC GLUCOMTR-MCNC: 164 MG/DL (ref 70–99)
GLUCOSE BLDC GLUCOMTR-MCNC: 89 MG/DL (ref 70–99)
GLUCOSE SERPL-MCNC: 182 MG/DL (ref 70–99)
GLUCOSE SERPL-MCNC: 94 MG/DL (ref 70–99)
HCT VFR BLD CALC: 27.9 % (ref 39–51)
HGB BLD-MCNC: 8.9 G/DL (ref 13–17)
IMM GRANULOCYTES # BLD AUTO: 0 K/MCL (ref 0–0.2)
IMM GRANULOCYTES # BLD: 0 %
LYMPHOCYTES # BLD: 1.2 K/MCL (ref 1–4)
LYMPHOCYTES NFR BLD: 19 %
MCH RBC QN AUTO: 32.2 PG (ref 26–34)
MCHC RBC AUTO-ENTMCNC: 31.9 G/DL (ref 32–36.5)
MCV RBC AUTO: 101.1 FL (ref 78–100)
MONOCYTES # BLD: 0.9 K/MCL (ref 0.3–0.9)
MONOCYTES NFR BLD: 14 %
NEUTROPHILS # BLD: 4.3 K/MCL (ref 1.8–7.7)
NEUTROPHILS NFR BLD: 66 %
NRBC BLD MANUAL-RTO: 0 /100 WBC
P AXIS (DEGREES): 84
PLATELET # BLD AUTO: 116 K/MCL (ref 140–450)
POTASSIUM SERPL-SCNC: 4 MMOL/L (ref 3.4–5.1)
POTASSIUM SERPL-SCNC: 4.7 MMOL/L (ref 3.4–5.1)
PR-INTERVAL (MSEC): 268
PROT SERPL-MCNC: 5.6 G/DL (ref 6.4–8.2)
PROT SERPL-MCNC: 5.7 G/DL (ref 6.4–8.2)
QRS-INTERVAL (MSEC): 122
QT-INTERVAL (MSEC): 342
QTC: 425
R AXIS (DEGREES): 16
RBC # BLD: 2.76 MIL/MCL (ref 4.5–5.9)
REPORT TEXT: NORMAL
SODIUM SERPL-SCNC: 140 MMOL/L (ref 135–145)
SODIUM SERPL-SCNC: 143 MMOL/L (ref 135–145)
T AXIS (DEGREES): 107
VENTRICULAR RATE EKG/MIN (BPM): 93
WBC # BLD: 6.5 K/MCL (ref 4.2–11)

## 2024-08-12 PROCEDURE — 99223 1ST HOSP IP/OBS HIGH 75: CPT | Performed by: INTERNAL MEDICINE

## 2024-08-12 PROCEDURE — 96372 THER/PROPH/DIAG INJ SC/IM: CPT | Performed by: INTERNAL MEDICINE

## 2024-08-12 PROCEDURE — 36415 COLL VENOUS BLD VENIPUNCTURE: CPT | Performed by: INTERNAL MEDICINE

## 2024-08-12 PROCEDURE — G0378 HOSPITAL OBSERVATION PER HR: HCPCS

## 2024-08-12 PROCEDURE — 10002800 HB RX 250 W HCPCS: Performed by: INTERNAL MEDICINE

## 2024-08-12 PROCEDURE — 85025 COMPLETE CBC W/AUTO DIFF WBC: CPT | Performed by: INTERNAL MEDICINE

## 2024-08-12 PROCEDURE — 80053 COMPREHEN METABOLIC PANEL: CPT | Performed by: INTERNAL MEDICINE

## 2024-08-12 PROCEDURE — 10004651 HB RX, NO CHARGE ITEM: Performed by: STUDENT IN AN ORGANIZED HEALTH CARE EDUCATION/TRAINING PROGRAM

## 2024-08-12 PROCEDURE — 10002800 HB RX 250 W HCPCS

## 2024-08-12 PROCEDURE — 10006031 HB ROOM CHARGE TELEMETRY

## 2024-08-12 PROCEDURE — 10002801 HB RX 250 W/O HCPCS: Performed by: INTERNAL MEDICINE

## 2024-08-12 PROCEDURE — 10002803 HB RX 637: Performed by: INTERNAL MEDICINE

## 2024-08-12 PROCEDURE — 10002807 HB RX 258

## 2024-08-12 PROCEDURE — 97161 PT EVAL LOW COMPLEX 20 MIN: CPT

## 2024-08-12 PROCEDURE — 82962 GLUCOSE BLOOD TEST: CPT

## 2024-08-12 PROCEDURE — 97530 THERAPEUTIC ACTIVITIES: CPT

## 2024-08-12 PROCEDURE — 80053 COMPREHEN METABOLIC PANEL: CPT

## 2024-08-12 RX ORDER — NICOTINE POLACRILEX 4 MG
15 LOZENGE BUCCAL PRN
Status: DISCONTINUED | OUTPATIENT
Start: 2024-08-12 | End: 2024-08-14 | Stop reason: HOSPADM

## 2024-08-12 RX ORDER — DEXTROSE MONOHYDRATE 25 G/50ML
25 INJECTION, SOLUTION INTRAVENOUS PRN
Status: DISCONTINUED | OUTPATIENT
Start: 2024-08-12 | End: 2024-08-14 | Stop reason: HOSPADM

## 2024-08-12 RX ORDER — MONTELUKAST SODIUM 10 MG/1
10 TABLET ORAL NIGHTLY
Status: DISCONTINUED | OUTPATIENT
Start: 2024-08-12 | End: 2024-08-14 | Stop reason: HOSPADM

## 2024-08-12 RX ORDER — ATORVASTATIN CALCIUM 40 MG/1
40 TABLET, FILM COATED ORAL DAILY
Status: DISCONTINUED | OUTPATIENT
Start: 2024-08-12 | End: 2024-08-14 | Stop reason: HOSPADM

## 2024-08-12 RX ORDER — NICOTINE POLACRILEX 4 MG
30 LOZENGE BUCCAL PRN
Status: DISCONTINUED | OUTPATIENT
Start: 2024-08-12 | End: 2024-08-14 | Stop reason: HOSPADM

## 2024-08-12 RX ORDER — PANTOPRAZOLE SODIUM 40 MG/1
40 TABLET, DELAYED RELEASE ORAL DAILY
Status: DISCONTINUED | OUTPATIENT
Start: 2024-08-12 | End: 2024-08-14 | Stop reason: HOSPADM

## 2024-08-12 RX ORDER — DEXTROSE MONOHYDRATE 25 G/50ML
12.5 INJECTION, SOLUTION INTRAVENOUS PRN
Status: DISCONTINUED | OUTPATIENT
Start: 2024-08-12 | End: 2024-08-14 | Stop reason: HOSPADM

## 2024-08-12 RX ORDER — METOPROLOL SUCCINATE 50 MG/1
50 TABLET, EXTENDED RELEASE ORAL DAILY
Status: DISCONTINUED | OUTPATIENT
Start: 2024-08-12 | End: 2024-08-14 | Stop reason: HOSPADM

## 2024-08-12 RX ADMIN — MONTELUKAST SODIUM 10 MG: 10 TABLET, FILM COATED ORAL at 20:39

## 2024-08-12 RX ADMIN — APIXABAN 5 MG: 5 TABLET, FILM COATED ORAL at 08:36

## 2024-08-12 RX ADMIN — INSULIN LISPRO 1 UNITS: 100 INJECTION, SOLUTION INTRAVENOUS; SUBCUTANEOUS at 17:50

## 2024-08-12 RX ADMIN — SODIUM CHLORIDE, PRESERVATIVE FREE 2 ML: 5 INJECTION INTRAVENOUS at 20:40

## 2024-08-12 RX ADMIN — PANTOPRAZOLE SODIUM 40 MG: 40 TABLET, DELAYED RELEASE ORAL at 08:36

## 2024-08-12 RX ADMIN — ATORVASTATIN CALCIUM 40 MG: 40 TABLET, FILM COATED ORAL at 08:36

## 2024-08-12 RX ADMIN — SODIUM CHLORIDE, PRESERVATIVE FREE 2 ML: 5 INJECTION INTRAVENOUS at 08:37

## 2024-08-12 RX ADMIN — SACUBITRIL AND VALSARTAN 1 TABLET: 24; 26 TABLET, FILM COATED ORAL at 20:39

## 2024-08-12 RX ADMIN — APIXABAN 5 MG: 5 TABLET, FILM COATED ORAL at 20:39

## 2024-08-12 RX ADMIN — SACUBITRIL AND VALSARTAN 1 TABLET: 24; 26 TABLET, FILM COATED ORAL at 08:36

## 2024-08-12 RX ADMIN — REMDESIVIR 200 MG: 100 INJECTION, POWDER, LYOPHILIZED, FOR SOLUTION INTRAVENOUS at 16:30

## 2024-08-12 RX ADMIN — HEPARIN SODIUM 5000 UNITS: 5000 INJECTION INTRAVENOUS; SUBCUTANEOUS at 06:01

## 2024-08-12 ASSESSMENT — ACTIVITIES OF DAILY LIVING (ADL): PRIOR_ADL: INDEPENDENT

## 2024-08-12 ASSESSMENT — COGNITIVE AND FUNCTIONAL STATUS - GENERAL
BASIC_MOBILITY_CONVERTED_SCORE: 50.88
DO YOU HAVE SERIOUS DIFFICULTY WALKING OR CLIMBING STAIRS: NO
BECAUSE OF A PHYSICAL, MENTAL, OR EMOTIONAL CONDITION, DO YOU HAVE DIFFICULTY DOING ERRANDS ALONE: NO
BASIC_MOBILITY_RAW_SCORE: 23
BECAUSE OF A PHYSICAL, MENTAL, OR EMOTIONAL CONDITION, DO YOU HAVE SERIOUS DIFFICULTY CONCENTRATING, REMEMBERING OR MAKING DECISIONS: NO

## 2024-08-12 ASSESSMENT — PAIN SCALES - GENERAL
PAINLEVEL_OUTOF10: 0

## 2024-08-13 LAB
ALBUMIN SERPL-MCNC: 2.8 G/DL (ref 3.6–5.1)
ALBUMIN/GLOB SERPL: 0.9 {RATIO} (ref 1–2.4)
ALP SERPL-CCNC: 54 UNITS/L (ref 45–117)
ALT SERPL-CCNC: 23 UNITS/L
ANION GAP SERPL CALC-SCNC: 8 MMOL/L (ref 7–19)
ANION GAP SERPL CALC-SCNC: 9 MMOL/L (ref 7–19)
AST SERPL-CCNC: 32 UNITS/L
BASOPHILS # BLD: 0 K/MCL (ref 0–0.3)
BASOPHILS NFR BLD: 1 %
BILIRUB SERPL-MCNC: 0.4 MG/DL (ref 0.2–1)
BUN SERPL-MCNC: 25 MG/DL (ref 6–20)
BUN SERPL-MCNC: 30 MG/DL (ref 6–20)
BUN/CREAT SERPL: 22 (ref 7–25)
BUN/CREAT SERPL: 27 (ref 7–25)
CALCIUM SERPL-MCNC: 8.8 MG/DL (ref 8.4–10.2)
CALCIUM SERPL-MCNC: 8.9 MG/DL (ref 8.4–10.2)
CHLORIDE SERPL-SCNC: 111 MMOL/L (ref 97–110)
CHLORIDE SERPL-SCNC: 113 MMOL/L (ref 97–110)
CO2 SERPL-SCNC: 24 MMOL/L (ref 21–32)
CO2 SERPL-SCNC: 25 MMOL/L (ref 21–32)
CREAT SERPL-MCNC: 1.13 MG/DL (ref 0.67–1.17)
CREAT SERPL-MCNC: 1.15 MG/DL (ref 0.67–1.17)
DEPRECATED RDW RBC: 45.2 FL (ref 39–50)
EGFRCR SERPLBLD CKD-EPI 2021: 60 ML/MIN/{1.73_M2}
EGFRCR SERPLBLD CKD-EPI 2021: 62 ML/MIN/{1.73_M2}
EOSINOPHIL # BLD: 0.2 K/MCL (ref 0–0.5)
EOSINOPHIL NFR BLD: 4 %
ERYTHROCYTE [DISTWIDTH] IN BLOOD: 12.4 % (ref 11–15)
FASTING DURATION TIME PATIENT: ABNORMAL H
FASTING DURATION TIME PATIENT: ABNORMAL H
GLOBULIN SER-MCNC: 3.1 G/DL (ref 2–4)
GLUCOSE BLDC GLUCOMTR-MCNC: 114 MG/DL (ref 70–99)
GLUCOSE BLDC GLUCOMTR-MCNC: 139 MG/DL (ref 70–99)
GLUCOSE BLDC GLUCOMTR-MCNC: 145 MG/DL (ref 70–99)
GLUCOSE BLDC GLUCOMTR-MCNC: 97 MG/DL (ref 70–99)
GLUCOSE SERPL-MCNC: 109 MG/DL (ref 70–99)
GLUCOSE SERPL-MCNC: 146 MG/DL (ref 70–99)
HCT VFR BLD CALC: 29.8 % (ref 39–51)
HGB BLD-MCNC: 10 G/DL (ref 13–17)
IMM GRANULOCYTES # BLD AUTO: 0 K/MCL (ref 0–0.2)
IMM GRANULOCYTES # BLD: 0 %
LYMPHOCYTES # BLD: 1.7 K/MCL (ref 1–4)
LYMPHOCYTES NFR BLD: 30 %
MAGNESIUM SERPL-MCNC: 2 MG/DL (ref 1.7–2.4)
MCH RBC QN AUTO: 33.2 PG (ref 26–34)
MCHC RBC AUTO-ENTMCNC: 33.6 G/DL (ref 32–36.5)
MCV RBC AUTO: 99 FL (ref 78–100)
MONOCYTES # BLD: 0.6 K/MCL (ref 0.3–0.9)
MONOCYTES NFR BLD: 11 %
NEUTROPHILS # BLD: 3 K/MCL (ref 1.8–7.7)
NEUTROPHILS NFR BLD: 54 %
NRBC BLD MANUAL-RTO: 0 /100 WBC
PLATELET # BLD AUTO: 146 K/MCL (ref 140–450)
POTASSIUM SERPL-SCNC: 3.6 MMOL/L (ref 3.4–5.1)
POTASSIUM SERPL-SCNC: 4 MMOL/L (ref 3.4–5.1)
PROT SERPL-MCNC: 5.9 G/DL (ref 6.4–8.2)
RAINBOW EXTRA TUBES HOLD SPECIMEN: NORMAL
RBC # BLD: 3.01 MIL/MCL (ref 4.5–5.9)
SODIUM SERPL-SCNC: 141 MMOL/L (ref 135–145)
SODIUM SERPL-SCNC: 141 MMOL/L (ref 135–145)
TROPONIN I SERPL DL<=0.01 NG/ML-MCNC: 16 NG/L
WBC # BLD: 5.6 K/MCL (ref 4.2–11)

## 2024-08-13 PROCEDURE — 10002807 HB RX 258

## 2024-08-13 PROCEDURE — 99233 SBSQ HOSP IP/OBS HIGH 50: CPT | Performed by: INTERNAL MEDICINE

## 2024-08-13 PROCEDURE — 80053 COMPREHEN METABOLIC PANEL: CPT

## 2024-08-13 PROCEDURE — 10002800 HB RX 250 W HCPCS

## 2024-08-13 PROCEDURE — 10002803 HB RX 637: Performed by: INTERNAL MEDICINE

## 2024-08-13 PROCEDURE — 36415 COLL VENOUS BLD VENIPUNCTURE: CPT

## 2024-08-13 PROCEDURE — 10004651 HB RX, NO CHARGE ITEM: Performed by: STUDENT IN AN ORGANIZED HEALTH CARE EDUCATION/TRAINING PROGRAM

## 2024-08-13 PROCEDURE — 99232 SBSQ HOSP IP/OBS MODERATE 35: CPT | Performed by: INTERNAL MEDICINE

## 2024-08-13 PROCEDURE — 85025 COMPLETE CBC W/AUTO DIFF WBC: CPT | Performed by: INTERNAL MEDICINE

## 2024-08-13 PROCEDURE — 84484 ASSAY OF TROPONIN QUANT: CPT | Performed by: INTERNAL MEDICINE

## 2024-08-13 PROCEDURE — 10002801 HB RX 250 W/O HCPCS: Performed by: INTERNAL MEDICINE

## 2024-08-13 PROCEDURE — 10006031 HB ROOM CHARGE TELEMETRY

## 2024-08-13 PROCEDURE — 80048 BASIC METABOLIC PNL TOTAL CA: CPT | Performed by: INTERNAL MEDICINE

## 2024-08-13 PROCEDURE — 83735 ASSAY OF MAGNESIUM: CPT | Performed by: INTERNAL MEDICINE

## 2024-08-13 RX ADMIN — EMPAGLIFLOZIN 10 MG: 10 TABLET, FILM COATED ORAL at 06:31

## 2024-08-13 RX ADMIN — SODIUM CHLORIDE, PRESERVATIVE FREE 2 ML: 5 INJECTION INTRAVENOUS at 21:00

## 2024-08-13 RX ADMIN — APIXABAN 5 MG: 5 TABLET, FILM COATED ORAL at 08:16

## 2024-08-13 RX ADMIN — SODIUM CHLORIDE, PRESERVATIVE FREE 2 ML: 5 INJECTION INTRAVENOUS at 08:16

## 2024-08-13 RX ADMIN — ATORVASTATIN CALCIUM 40 MG: 40 TABLET, FILM COATED ORAL at 08:16

## 2024-08-13 RX ADMIN — SACUBITRIL AND VALSARTAN 1 TABLET: 24; 26 TABLET, FILM COATED ORAL at 08:20

## 2024-08-13 RX ADMIN — APIXABAN 5 MG: 5 TABLET, FILM COATED ORAL at 20:41

## 2024-08-13 RX ADMIN — REMDESIVIR 100 MG: 100 INJECTION, POWDER, LYOPHILIZED, FOR SOLUTION INTRAVENOUS at 08:23

## 2024-08-13 RX ADMIN — MONTELUKAST SODIUM 10 MG: 10 TABLET, FILM COATED ORAL at 20:41

## 2024-08-13 RX ADMIN — METOPROLOL SUCCINATE 50 MG: 50 TABLET, EXTENDED RELEASE ORAL at 08:16

## 2024-08-13 RX ADMIN — SACUBITRIL AND VALSARTAN 1 TABLET: 24; 26 TABLET, FILM COATED ORAL at 20:41

## 2024-08-13 RX ADMIN — PANTOPRAZOLE SODIUM 40 MG: 40 TABLET, DELAYED RELEASE ORAL at 08:16

## 2024-08-13 ASSESSMENT — PAIN SCALES - GENERAL: PAINLEVEL_OUTOF10: 0

## 2024-08-14 ENCOUNTER — APPOINTMENT (OUTPATIENT)
Dept: CARDIOLOGY | Age: 89
DRG: 871 | End: 2024-08-14
Attending: INTERNAL MEDICINE

## 2024-08-14 VITALS
BODY MASS INDEX: 19.22 KG/M2 | SYSTOLIC BLOOD PRESSURE: 122 MMHG | WEIGHT: 145 LBS | TEMPERATURE: 97.5 F | HEIGHT: 73 IN | DIASTOLIC BLOOD PRESSURE: 49 MMHG | OXYGEN SATURATION: 99 % | HEART RATE: 76 BPM | RESPIRATION RATE: 18 BRPM

## 2024-08-14 LAB
ALBUMIN SERPL-MCNC: 3 G/DL (ref 3.6–5.1)
ALBUMIN/GLOB SERPL: 0.9 {RATIO} (ref 1–2.4)
ALP SERPL-CCNC: 64 UNITS/L (ref 45–117)
ALT SERPL-CCNC: 28 UNITS/L
ANION GAP SERPL CALC-SCNC: 8 MMOL/L (ref 7–19)
AORTIC VALVE AREA (AVA): 0.92
ASCENDING AORTA (AAD): 3
AST SERPL-CCNC: 35 UNITS/L
AV MEAN GRADIENT (AVMG): 5
AV MEAN VELOCITY (AVMV): 1.01
AV PEAK GRADIENT (AVPG): 8
AV PEAK VELOCITY (AVPV): 1.43
AV STENOSIS SEVERITY TEXT: NORMAL
AVI LVOT PEAK GRADIENT (LVOTMG): 0.9
BILIRUB SERPL-MCNC: 0.4 MG/DL (ref 0.2–1)
BUN SERPL-MCNC: 25 MG/DL (ref 6–20)
BUN/CREAT SERPL: 22 (ref 7–25)
CALCIUM SERPL-MCNC: 9.1 MG/DL (ref 8.4–10.2)
CHLORIDE SERPL-SCNC: 110 MMOL/L (ref 97–110)
CO2 SERPL-SCNC: 27 MMOL/L (ref 21–32)
CREAT SERPL-MCNC: 1.12 MG/DL (ref 0.67–1.17)
E WAVE DECELARATION TIME (MDT): 15.69
EGFRCR SERPLBLD CKD-EPI 2021: 62 ML/MIN/{1.73_M2}
FASTING DURATION TIME PATIENT: ABNORMAL H
GLOBULIN SER-MCNC: 3.2 G/DL (ref 2–4)
GLUCOSE BLDC GLUCOMTR-MCNC: 109 MG/DL (ref 70–99)
GLUCOSE BLDC GLUCOMTR-MCNC: 124 MG/DL (ref 70–99)
GLUCOSE BLDC GLUCOMTR-MCNC: 99 MG/DL (ref 70–99)
GLUCOSE SERPL-MCNC: 102 MG/DL (ref 70–99)
INTERVENTRICULAR SEPTUM IN END DIASTOLE (IVSD): 2.48
LEFT INTERNAL DIMENSION IN SYSTOLE (LVSD): 0.9
LEFT VENTRICULAR INTERNAL DIMENSION IN DIASTOLE (LVDD): 3.6
LEFT VENTRICULAR POSTERIOR WALL IN END DIASTOLE (LVPW): 4.7
LV EF: NORMAL %
MV E TISSUE VEL MED (MESV): 9.14
MV E WAVE VEL/E TISSUE VEL MED(MSR): 5.87
MV PEAK A VELOCITY (MVPAV): 175
MV PEAK E VELOCITY (MVPEV): 1.14
POTASSIUM SERPL-SCNC: 3.8 MMOL/L (ref 3.4–5.1)
PROT SERPL-MCNC: 6.2 G/DL (ref 6.4–8.2)
RAINBOW EXTRA TUBES HOLD SPECIMEN: NORMAL
RV END SYSTOLIC LONGITUDINAL STRAIN FREE WALL (RVGS): 2.5
SODIUM SERPL-SCNC: 141 MMOL/L (ref 135–145)
TRICUSPID VALVE PEAK REGURGITATION VELOCITY (TRPV): 2.6
TV ESTIMATED RIGHT ARTERIAL PRESSURE (RAP): 14

## 2024-08-14 PROCEDURE — 10002801 HB RX 250 W/O HCPCS: Performed by: INTERNAL MEDICINE

## 2024-08-14 PROCEDURE — 10004180 HB COUNTER-TRANSPORT

## 2024-08-14 PROCEDURE — 10002803 HB RX 637: Performed by: INTERNAL MEDICINE

## 2024-08-14 PROCEDURE — 10002807 HB RX 258

## 2024-08-14 PROCEDURE — 99239 HOSP IP/OBS DSCHRG MGMT >30: CPT | Performed by: INTERNAL MEDICINE

## 2024-08-14 PROCEDURE — 10002019 HB COUNTER RESP ASSESSMENT

## 2024-08-14 PROCEDURE — 76376 3D RENDER W/INTRP POSTPROCES: CPT

## 2024-08-14 PROCEDURE — 10002800 HB RX 250 W HCPCS

## 2024-08-14 PROCEDURE — 10004651 HB RX, NO CHARGE ITEM: Performed by: STUDENT IN AN ORGANIZED HEALTH CARE EDUCATION/TRAINING PROGRAM

## 2024-08-14 PROCEDURE — 99233 SBSQ HOSP IP/OBS HIGH 50: CPT | Performed by: INTERNAL MEDICINE

## 2024-08-14 PROCEDURE — 94640 AIRWAY INHALATION TREATMENT: CPT

## 2024-08-14 PROCEDURE — 36415 COLL VENOUS BLD VENIPUNCTURE: CPT

## 2024-08-14 PROCEDURE — 80053 COMPREHEN METABOLIC PANEL: CPT

## 2024-08-14 PROCEDURE — 10004651 HB RX, NO CHARGE ITEM: Performed by: INTERNAL MEDICINE

## 2024-08-14 RX ORDER — HYDRALAZINE HYDROCHLORIDE 10 MG/1
10 TABLET, FILM COATED ORAL EVERY 6 HOURS PRN
Status: DISCONTINUED | OUTPATIENT
Start: 2024-08-14 | End: 2024-08-14 | Stop reason: HOSPADM

## 2024-08-14 RX ORDER — ALBUTEROL SULFATE 0.83 MG/ML
2.5 SOLUTION RESPIRATORY (INHALATION)
Status: DISCONTINUED | OUTPATIENT
Start: 2024-08-14 | End: 2024-08-14 | Stop reason: HOSPADM

## 2024-08-14 RX ORDER — ASPIRIN 81 MG/1
81 TABLET, CHEWABLE ORAL DAILY
Qty: 30 TABLET | Refills: 0 | Status: SHIPPED | OUTPATIENT
Start: 2024-08-14

## 2024-08-14 RX ORDER — AMIODARONE HYDROCHLORIDE 200 MG/1
200 TABLET ORAL 2 TIMES DAILY
Qty: 60 TABLET | Refills: 0 | Status: SHIPPED | OUTPATIENT
Start: 2024-08-14

## 2024-08-14 RX ORDER — ASPIRIN 81 MG/1
81 TABLET, CHEWABLE ORAL DAILY
Status: DISCONTINUED | OUTPATIENT
Start: 2024-08-14 | End: 2024-08-14 | Stop reason: HOSPADM

## 2024-08-14 RX ADMIN — ALBUTEROL SULFATE 2.5 MG: 2.5 SOLUTION RESPIRATORY (INHALATION) at 11:59

## 2024-08-14 RX ADMIN — APIXABAN 5 MG: 5 TABLET, FILM COATED ORAL at 09:05

## 2024-08-14 RX ADMIN — ATORVASTATIN CALCIUM 40 MG: 40 TABLET, FILM COATED ORAL at 09:05

## 2024-08-14 RX ADMIN — SODIUM CHLORIDE, PRESERVATIVE FREE 2 ML: 5 INJECTION INTRAVENOUS at 09:06

## 2024-08-14 RX ADMIN — SACUBITRIL AND VALSARTAN 1 TABLET: 24; 26 TABLET, FILM COATED ORAL at 09:05

## 2024-08-14 RX ADMIN — METOPROLOL SUCCINATE 50 MG: 50 TABLET, EXTENDED RELEASE ORAL at 09:05

## 2024-08-14 RX ADMIN — PANTOPRAZOLE SODIUM 40 MG: 40 TABLET, DELAYED RELEASE ORAL at 09:05

## 2024-08-14 RX ADMIN — REMDESIVIR 100 MG: 100 INJECTION, POWDER, LYOPHILIZED, FOR SOLUTION INTRAVENOUS at 09:15

## 2024-08-14 RX ADMIN — EMPAGLIFLOZIN 10 MG: 10 TABLET, FILM COATED ORAL at 06:38

## 2024-08-14 RX ADMIN — ASPIRIN 81 MG CHEWABLE TABLET 81 MG: 81 TABLET CHEWABLE at 13:02

## 2024-08-14 ASSESSMENT — PULMONARY FUNCTION TESTS
FEV1: 0
FEV1: 0
FEV1_PREDICTED: 0
FEV1/FVC: UNABLE TO OBTAIN, OR GREATER THAN 70%
FEV1: 0

## 2024-08-14 ASSESSMENT — PAIN SCALES - GENERAL
PAINLEVEL_OUTOF10: 0
PAINLEVEL_OUTOF10: 0

## 2024-08-16 ENCOUNTER — TELEPHONE (OUTPATIENT)
Dept: CARE COORDINATION | Age: 89
End: 2024-08-16

## 2024-08-16 LAB
BACTERIA BLD CULT: NORMAL
BACTERIA BLD CULT: NORMAL

## 2024-08-17 ENCOUNTER — TELEPHONE (OUTPATIENT)
Dept: CARE COORDINATION | Age: 89
End: 2024-08-17

## 2024-08-23 ENCOUNTER — TELEPHONE (OUTPATIENT)
Dept: CARE COORDINATION | Age: 89
End: 2024-08-23

## 2024-08-24 ENCOUNTER — TELEPHONE (OUTPATIENT)
Dept: CARE COORDINATION | Age: 89
End: 2024-08-24

## 2024-08-30 ENCOUNTER — TELEPHONE (OUTPATIENT)
Dept: CARE COORDINATION | Age: 89
End: 2024-08-30

## 2024-08-31 ENCOUNTER — TELEPHONE (OUTPATIENT)
Dept: CARE COORDINATION | Age: 89
End: 2024-08-31

## 2024-09-06 ENCOUNTER — TELEPHONE (OUTPATIENT)
Dept: CARE COORDINATION | Age: 89
End: 2024-09-06

## 2024-09-13 ENCOUNTER — TELEPHONE (OUTPATIENT)
Dept: CARE COORDINATION | Age: 89
End: 2024-09-13

## 2025-06-30 ENCOUNTER — HOSPITAL ENCOUNTER (INPATIENT)
Facility: HOSPITAL | Age: OVER 89
LOS: 1 days | Discharge: HOME | End: 2025-07-01
Attending: EMERGENCY MEDICINE | Admitting: HOSPITALIST
Payer: MEDICARE

## 2025-06-30 ENCOUNTER — APPOINTMENT (OUTPATIENT)
Dept: CV DIAGNOSTICS | Facility: HOSPITAL | Age: OVER 89
End: 2025-06-30
Attending: HOSPITALIST
Payer: MEDICARE

## 2025-06-30 ENCOUNTER — APPOINTMENT (OUTPATIENT)
Dept: GENERAL RADIOLOGY | Facility: HOSPITAL | Age: OVER 89
End: 2025-06-30
Payer: MEDICARE

## 2025-06-30 DIAGNOSIS — J90 PLEURAL EFFUSION: Primary | ICD-10-CM

## 2025-06-30 DIAGNOSIS — R60.0 BILATERAL LOWER EXTREMITY EDEMA: ICD-10-CM

## 2025-06-30 DIAGNOSIS — I50.9 ACUTE ON CHRONIC CONGESTIVE HEART FAILURE, UNSPECIFIED HEART FAILURE TYPE (HCC): ICD-10-CM

## 2025-06-30 PROBLEM — I50.23 ACUTE ON CHRONIC HEART FAILURE WITH REDUCED EJECTION FRACTION (HFREF, <= 40%) (HCC): Status: ACTIVE | Noted: 2025-06-30

## 2025-06-30 LAB
ALBUMIN SERPL-MCNC: 4.2 G/DL (ref 3.2–4.8)
ALBUMIN/GLOB SERPL: 1.8 {RATIO} (ref 1–2)
ALP LIVER SERPL-CCNC: 83 U/L (ref 45–117)
ALT SERPL-CCNC: 22 U/L (ref 10–49)
ANION GAP SERPL CALC-SCNC: 6 MMOL/L (ref 0–18)
AST SERPL-CCNC: 22 U/L (ref ?–34)
BASOPHILS # BLD AUTO: 0.04 X10(3) UL (ref 0–0.2)
BASOPHILS NFR BLD AUTO: 0.6 %
BILIRUB SERPL-MCNC: 0.6 MG/DL (ref 0.2–0.9)
BUN BLD-MCNC: 32 MG/DL (ref 9–23)
BUN/CREAT SERPL: 21.6 (ref 10–20)
CALCIUM BLD-MCNC: 9.2 MG/DL (ref 8.7–10.4)
CHLORIDE SERPL-SCNC: 108 MMOL/L (ref 98–112)
CO2 SERPL-SCNC: 27 MMOL/L (ref 21–32)
CREAT BLD-MCNC: 1.48 MG/DL (ref 0.7–1.3)
DEPRECATED RDW RBC AUTO: 47.7 FL (ref 35.1–46.3)
EGFRCR SERPLBLD CKD-EPI 2021: 44 ML/MIN/1.73M2 (ref 60–?)
EOSINOPHIL # BLD AUTO: 0.22 X10(3) UL (ref 0–0.7)
EOSINOPHIL NFR BLD AUTO: 3.5 %
ERYTHROCYTE [DISTWIDTH] IN BLOOD BY AUTOMATED COUNT: 13.3 % (ref 11–15)
EST. AVERAGE GLUCOSE BLD GHB EST-MCNC: 151 MG/DL (ref 68–126)
FLUAV + FLUBV RNA SPEC NAA+PROBE: NEGATIVE
FLUAV + FLUBV RNA SPEC NAA+PROBE: NEGATIVE
GLOBULIN PLAS-MCNC: 2.4 G/DL (ref 2–3.5)
GLUCOSE BLD-MCNC: 179 MG/DL (ref 70–99)
GLUCOSE BLDC GLUCOMTR-MCNC: 145 MG/DL (ref 70–99)
HBA1C MFR BLD: 6.9 % (ref ?–5.7)
HCT VFR BLD AUTO: 30.4 % (ref 39–53)
HGB BLD-MCNC: 10.1 G/DL (ref 13–17.5)
IMM GRANULOCYTES # BLD AUTO: 0.02 X10(3) UL (ref 0–1)
IMM GRANULOCYTES NFR BLD: 0.3 %
LYMPHOCYTES # BLD AUTO: 1.88 X10(3) UL (ref 1–4)
LYMPHOCYTES NFR BLD AUTO: 30 %
MCH RBC QN AUTO: 32.5 PG (ref 26–34)
MCHC RBC AUTO-ENTMCNC: 33.2 G/DL (ref 31–37)
MCV RBC AUTO: 97.7 FL (ref 80–100)
MONOCYTES # BLD AUTO: 0.79 X10(3) UL (ref 0.1–1)
MONOCYTES NFR BLD AUTO: 12.6 %
NEUTROPHILS # BLD AUTO: 3.32 X10 (3) UL (ref 1.5–7.7)
NEUTROPHILS # BLD AUTO: 3.32 X10(3) UL (ref 1.5–7.7)
NEUTROPHILS NFR BLD AUTO: 53 %
NT-PROBNP SERPL-MCNC: 5314 PG/ML (ref ?–450)
OSMOLALITY SERPL CALC.SUM OF ELEC: 303 MOSM/KG (ref 275–295)
PLATELET # BLD AUTO: 213 10(3)UL (ref 150–450)
POTASSIUM SERPL-SCNC: 5.1 MMOL/L (ref 3.5–5.1)
PROT SERPL-MCNC: 6.6 G/DL (ref 5.7–8.2)
Q-T INTERVAL: 340 MS
QRS DURATION: 112 MS
QTC CALCULATION (BEZET): 436 MS
R AXIS: 60 DEGREES
RBC # BLD AUTO: 3.11 X10(6)UL (ref 3.8–5.8)
RSV RNA SPEC NAA+PROBE: NEGATIVE
SARS-COV-2 RNA RESP QL NAA+PROBE: NOT DETECTED
SODIUM SERPL-SCNC: 141 MMOL/L (ref 136–145)
T AXIS: 151 DEGREES
TROPONIN I SERPL HS-MCNC: 10 NG/L (ref ?–53)
VENTRICULAR RATE: 99 BPM
WBC # BLD AUTO: 6.3 X10(3) UL (ref 4–11)

## 2025-06-30 PROCEDURE — 71045 X-RAY EXAM CHEST 1 VIEW: CPT

## 2025-06-30 PROCEDURE — 99223 1ST HOSP IP/OBS HIGH 75: CPT | Performed by: HOSPITALIST

## 2025-06-30 RX ORDER — SACUBITRIL AND VALSARTAN 24; 26 MG/1; MG/1
1 TABLET, FILM COATED ORAL 2 TIMES DAILY
COMMUNITY
Start: 2025-06-02

## 2025-06-30 RX ORDER — FUROSEMIDE 10 MG/ML
40 INJECTION INTRAMUSCULAR; INTRAVENOUS
Status: DISCONTINUED | OUTPATIENT
Start: 2025-06-30 | End: 2025-07-01

## 2025-06-30 RX ORDER — ATORVASTATIN CALCIUM 40 MG/1
40 TABLET, FILM COATED ORAL DAILY
Status: DISCONTINUED | OUTPATIENT
Start: 2025-07-01 | End: 2025-07-01

## 2025-06-30 RX ORDER — METOCLOPRAMIDE HYDROCHLORIDE 5 MG/ML
5 INJECTION INTRAMUSCULAR; INTRAVENOUS EVERY 8 HOURS PRN
Status: DISCONTINUED | OUTPATIENT
Start: 2025-06-30 | End: 2025-07-01

## 2025-06-30 RX ORDER — METOPROLOL SUCCINATE 50 MG/1
50 TABLET, EXTENDED RELEASE ORAL DAILY
Status: DISCONTINUED | OUTPATIENT
Start: 2025-07-01 | End: 2025-07-01

## 2025-06-30 RX ORDER — PANTOPRAZOLE SODIUM 40 MG/1
40 TABLET, DELAYED RELEASE ORAL
Status: DISCONTINUED | OUTPATIENT
Start: 2025-07-01 | End: 2025-07-01

## 2025-06-30 RX ORDER — FUROSEMIDE 10 MG/ML
40 INJECTION INTRAMUSCULAR; INTRAVENOUS ONCE
Status: COMPLETED | OUTPATIENT
Start: 2025-06-30 | End: 2025-06-30

## 2025-06-30 RX ORDER — DEXTROSE MONOHYDRATE 25 G/50ML
50 INJECTION, SOLUTION INTRAVENOUS
Status: DISCONTINUED | OUTPATIENT
Start: 2025-06-30 | End: 2025-07-01

## 2025-06-30 RX ORDER — ONDANSETRON 2 MG/ML
4 INJECTION INTRAMUSCULAR; INTRAVENOUS EVERY 6 HOURS PRN
Status: DISCONTINUED | OUTPATIENT
Start: 2025-06-30 | End: 2025-07-01

## 2025-06-30 RX ORDER — ACETAMINOPHEN 500 MG
500 TABLET ORAL EVERY 4 HOURS PRN
Status: DISCONTINUED | OUTPATIENT
Start: 2025-06-30 | End: 2025-07-01

## 2025-06-30 RX ORDER — NICOTINE POLACRILEX 4 MG
15 LOZENGE BUCCAL
Status: DISCONTINUED | OUTPATIENT
Start: 2025-06-30 | End: 2025-07-01

## 2025-06-30 RX ORDER — MONTELUKAST SODIUM 10 MG/1
10 TABLET ORAL DAILY
COMMUNITY

## 2025-06-30 RX ORDER — NICOTINE POLACRILEX 4 MG
30 LOZENGE BUCCAL
Status: DISCONTINUED | OUTPATIENT
Start: 2025-06-30 | End: 2025-07-01

## 2025-06-30 RX ORDER — MONTELUKAST SODIUM 10 MG/1
10 TABLET ORAL DAILY
Status: DISCONTINUED | OUTPATIENT
Start: 2025-07-01 | End: 2025-07-01

## 2025-06-30 RX ORDER — SACUBITRIL AND VALSARTAN 24; 26 MG/1; MG/1
1 TABLET, FILM COATED ORAL 2 TIMES DAILY
Status: DISCONTINUED | OUTPATIENT
Start: 2025-06-30 | End: 2025-07-01

## 2025-06-30 NOTE — H&P
[facility]    PATIENT'S NAME: JADA MCKEON   ATTENDING PHYSICIAN: Tono Barrera DO   PATIENT ACCOUNT#:   [id number]    LOCATION:  [address]  MEDICAL RECORD #:   [id number]       YOB: 1934  ADMISSION DATE:       06/30/2025    HISTORY AND PHYSICAL EXAMINATION    CHIEF COMPLAINT:  Acute on chronic heart failure.    HISTORY OF PRESENT ILLNESS:  The patient is a 89-year-old  male with known underlying ischemic cardiomyopathy, recently was given a course of antibiotics for abnormal chest x-ray though he did not have any symptoms of pneumonia.  Chest x-ray continued to show fluid overload status.  Today, he was sent to the emergency department at [place] for evaluation.  CBC showed hemoglobin 10.1.  Chemistry showed GFR of 44 which is at baseline.  BUN 32 and creatinine 1.48, glucose 179.  ProBNP 5300.  GeneXpert viral panel was negative.  Chest x-ray showed increased interstitial markings consistent with fluid overload status.  EKG showed atrial fibrillation, rate controlled.    PAST MEDICAL HISTORY:  Coronary artery disease with proximal left main and ostial obtuse marginal heavy calcifications, high risk for PCI.  He was diagnosed with ischemic cardiomyopathy, ejection fraction 15 to 20% with normal ejection fraction as of 2024 with GDMT.  He had history of chronic atrial fibrillation, anticoagulated with Eliquis.  Could not tolerate amiodarone.  Hyperlipidemia, diabetes mellitus type 2, peripheral arterial disease, carotid atherosclerosis, cerebrovascular accident, chronic kidney disease stage 3, generalized osteoarthritis, anemia of chronic kidney disease, gastroesophageal reflux disease, diabetes mellitus type 2.    PAST SURGICAL HISTORY:  Left carotid endarterectomy and cataract procedure.    MEDICATIONS:  Please see medication reconciliation list.     ALLERGIES:  No known drug allergies.    FAMILY HISTORY:  Positive for heart disease.    SOCIAL HISTORY:  Lives with  his wife in an independent living facility.  He gets his food from the cafeteria over there, but he watches the salt intake in his diet.  Usually independent in his basic activities of daily living.    REVIEW OF SYSTEMS:  Patient reports progressive dyspnea on exertion.  When he exerts himself, he has to stop to catch his breath.  He denies any chest pain.  No fever or chills.  He does have chronic cough, but no phlegm production.  No sick contacts.  No dysphagia or suggestive symptoms of aspiration pneumonia.      PHYSICAL EXAMINATION:    GENERAL:  Alert and oriented to time, place, and person.  No acute distress.  VITAL SIGNS:  Temperature 97.6, pulse 85, respiratory rate 22, blood pressure 108/52, pulse ox 94% on room air.    HEENT:  Atraumatic.  Oropharynx clear.  Trachea midline.  Eyes:  Anicteric sclerae.   NECK:  Positive jugular venous distention noted on neck exam.  LUNGS:  Faint crackles auscultated on both lung fields.  HEART:  Irregular rhythm.  S1 and S2 auscultated.  No murmur.   ABDOMEN:  Soft, nondistended.  No tenderness.  Positive bowel sounds.    EXTREMITIES:  Edema +1 both legs.  No clubbing or cyanosis.  NEUROLOGIC:  Motor and sensory intact.    ASSESSMENT:    1.   Acute heart failure, history of ischemic cardiomyopathy with recovered ejection fraction.  2.   Coronary artery disease as outlined above.  3.   Chronic kidney disease stage 3.  4.   Diabetes mellitus type 2.  5.   Musculoskeletal deconditioning.    PLAN:  Patient will be admitted to telemetry floor.  IV Lasix.  Monitor hemodynamic status, electrolytes, and kidney function.  He had chronic hyperkalemia and he gets Lokelma 3 times a week.  Will continue to monitor potassium level.  Fall precautions.  Physical and occupational therapy and cardiology consults.  Monitor Accu-Cheks.  Further recommendations to follow.     Dictated By Jonathan Santizo MD  d: 06/30/2025 17:17:16  t: 06/30/2025 17:19:02  Job [id number]  /

## 2025-06-30 NOTE — CONSULTS
Cardiology Consultation  [facility]    Edward Heard Patient Status:  Emergency    1934 MRN [id number]   Location [facility] EMERGENCY DEPARTMENT Attending Tono Barrera, DO   Hosp Day # 0 PCP COURTNEY BROWN     Reason for Consultation:  CHF    History of Present Illness:  Edward Heard is a a(n) 89 year old male with HFrecEF, pAF, CAD, HDL, carotid artery disease here for SOB found to have CHF. He has been having SOB for the last couple weeks. Initially thought to be PNA for which he was given course of abx, but continued to feel worse until he came to the ED today. Abx did not improve symptoms.    CXR showing moderate left and small right pleural effusions with interstitial markings bilaterally.  Pro-BNP elevated to 5000      Assessment/Plan:    Acute decompensated HFrecEF  CAD, tx medically  pAF on DOAC  HDL  Carotid artery disease on statin  T2DM  Carotid artery disease s/p left CEA    - IV lasix 40mg BID  - Continue home GDMT  - Continue DOAC  - Continue statin  - Strict I/O, daily BMP, daily weights    History:  Past Medical History[1]  Past Surgical History[2]  Family History[3]   reports that he quit smoking about 46 years ago. His smoking use included cigarettes. He started smoking about 76 years ago. He has a 30 pack-year smoking history. He has never been exposed to tobacco smoke. He has never used smokeless tobacco. He reports that he does not drink alcohol and does not use drugs.    Allergies:  Allergies[4]    Medications:  Medications Ordered Prior to Encounter[5]    Review of Systems:  As above, otherwise negative.      Physical Exam:  Blood pressure 108/52, pulse 85, temperature 97.6 °F (36.4 °C), temperature source Oral, resp. rate 22, height 6' 1\" (1.854 m), weight 151 lb (68.5 kg), SpO2 94%.  Wt Readings from Last 3 Encounters:   25 151 lb (68.5 kg)   24 143 lb (64.9 kg)   02/10/24 154 lb 1.6 oz (69.9 kg)       General: awake, alert, oriented x 3, no acute  distress  HEENT: at/nc, perrl, eomi  Neck: No JVD, carotids 2+ no bruits.  Cardiac: Regular rate and rhythm, S1, S2 normal, no murmur, rub or gallop.  Lungs: Bibasilar crackles  Abdomen: Soft, non-tender, non-distended, normal bowel sounds   Extremities: Without clubbing, cyanosis or edema.  Peripheral pulses are 2+.  Neurologic: Alert and oriented, normal affect.  Psych: normal mood and affect  Skin: Warm and dry.       Laboratories and Data:  Diagnostics:      Labs:   CBC:    Lab Results   Component Value Date    WBC 6.3 06/30/2025    WBC 7.6 03/28/2024    WBC 9.0 03/27/2024     Lab Results   Component Value Date    HEMOGLOBIN 12.7 04/02/2015    HEMOGLOBIN 12.7 10/01/2014    HEMOGLOBIN 12.7 04/02/2014    HGB 10.1 (L) 06/30/2025    HGB 10.5 (L) 03/28/2024    HGB 10.0 (L) 03/27/2024      Lab Results   Component Value Date    .0 06/30/2025    .0 03/28/2024    .0 03/27/2024     BMP:     Lab Results   Component Value Date    GLUCOSE 136 (H) 04/02/2015    GLUCOSE 122 (H) 10/01/2014    GLUCOSE 114 (H) 04/02/2014     Lab Results   Component Value Date    K 5.1 06/30/2025    K 4.4 03/28/2024    K 5.0 03/27/2024     Lab Results   Component Value Date    BUN 32 (H) 06/30/2025    BUN 25 (H) 03/28/2024    BUN 34 (H) 03/27/2024     Lab Results   Component Value Date    CREATSERUM 1.48 (H) 06/30/2025    CREATSERUM 1.42 (H) 03/28/2024    CREATSERUM 1.60 (H) 03/27/2024     Cholesterol:     Lab Results   Component Value Date    CHOLEST 88 02/06/2024    CHOLEST 133.00 10/18/2021    CHOLEST 116.00 10/01/2020     Lab Results   Component Value Date    HDL 32 (L) 02/06/2024    HDL 58 10/18/2021    HDL 54 10/01/2020     Lab Results   Component Value Date    TRIG 60 02/06/2024    TRIG 44.00 10/18/2021    TRIG 42.00 10/01/2020    TRIGLY 61 04/02/2015    TRIGLY 75 10/01/2014    TRIGLY 63 04/02/2014     Lab Results   Component Value Date    LDL 42 02/06/2024    LDL 66 10/18/2021    LDL 54 10/01/2020     Lab Results    Component Value Date    AST 22 06/30/2025    AST 16 03/26/2024    AST 25 02/01/2024     Lab Results   Component Value Date    ALT 22 06/30/2025    ALT 14 03/26/2024    ALT 20 02/01/2024           Mau Grigsby MD  Interventional Cardiology  [facility]  6/30/2025  4:52 PM         [1]   Past Medical History:   Cataract    Dry eye    Former smoker    Hyperlipidemia    Impaired fasting glucose    Left carotid bruit    Orbital mass    left    Pseudophakia    PVD (peripheral vascular disease)    Stroke (cerebrum) (HCC)   [2]   Past Surgical History:  Procedure Laterality Date    Bedside cardioversion  2/9/2024    Carotid endarterectomy Left 1995    Cataract Bilateral     Yag capsulotomy - od - right eye Right 06/21/2005   [3]   Family History  Problem Relation Age of Onset    Cataracts Mother    [4] No Known Allergies  [5]   No current facility-administered medications on file prior to encounter.     Current Outpatient Medications on File Prior to Encounter   Medication Sig Dispense Refill    metoprolol succinate ER 50 MG Oral Tablet 24 Hr Take 1 tablet (50 mg total) by mouth daily. 30 tablet 0    pantoprazole 40 MG Oral Tab EC Take 1 tablet (40 mg total) by mouth every morning before breakfast. 30 tablet 0    atorvastatin 40 MG Oral Tab Take 1 tablet (40 mg total) by mouth daily. 90 tablet 0    apixaban 5 MG Oral Tab Take 1 tablet (5 mg total) by mouth 2 (two) times daily. 60 tablet 0    metFORMIN 500 MG Oral Tab Take 1 tablet (500 mg total) by mouth daily with breakfast.      MULTIVITAMINS OR one tab daily

## 2025-06-30 NOTE — ED QUICK NOTES
Orders for admission, patient is aware of plan and ready to go upstairs. Any questions, please call ED RN Quynh at extension [phone].     Patient Covid vaccination status: Fully vaccinated     COVID Test Ordered in ED: SARS-CoV-2/Flu A and B/RSV by PCR (GeneXpert)    COVID Suspicion at Admission: N/A    Running Infusions: None    Mental Status/LOC at time of transport: ALERT    Other pertinent information: On RA, ambulates independently, 20G IV R AC, family at bedside    CIWA score: N/A   NIH score:  N/A

## 2025-06-30 NOTE — ED INITIAL ASSESSMENT (HPI)
Pt A/Ox4 presented to ED c/o shortness of breath with activity for the past 3-4 weeks. Pt's daughter reported he was recently diagnosed with Pneumonia and according to the family the symptoms are getting worse. Denied chills, fever.

## 2025-06-30 NOTE — ED PROVIDER NOTES
Patient Seen in: [facility] Emergency Department    History     Chief Complaint   Patient presents with    Difficulty Breathing       HPI    89-year-old male presents ER with complaint of shortness of breath.  Patient with past medical history of pneumonia having finished his antibiotic.  Patient had repeat chest x-ray outpatient and showed concern for pleural effusion.  Patient states he has been having shortness of breath.  Patient sent to the ER for further evaluation.  Patient states he feels short of breath on exertion but denies any chest pain.  Patient's oxygen saturation 98% room air    History reviewed. Past Medical History[1]    History reviewed. Past Surgical History[2]      Medications :  Prescriptions Prior to Admission[3]     Family History[4]    Smoking Status: Social Hx on file[5]    ROS  All pertinent positives for the review of systems are mentioned in the HPI  All other organ systems are reviewed and are negative.    Constitutional and vital signs reviewed.      Social History and Family History elements reviewed from today, pertinent positives to the presenting problem noted.    Physical Exam     ED Triage Vitals [06/30/25 1257]   /46   Pulse 104   Resp 19   Temp 97.6 °F (36.4 °C)   Temp src Oral   SpO2 96 %   O2 Device None (Room air)       All measures to prevent infection transmission during my interaction with the patient were taken. The patient was already wearing a droplet mask on my arrival to the room. Personal protective equipment including droplet mask, eye protection, and gloves were worn throughout the duration of the exam.  Handwashing was performed prior to and after the exam.  Stethoscope and any equipment used during my examination was cleaned with super sani-cloth germicidal wipes following the exam.     Physical Exam  Constitutional:       Appearance: He is well-developed.   HENT:      Head: Normocephalic and atraumatic.      Right Ear: External ear normal.       Left Ear: External ear normal.      Nose: Nose normal.   Eyes:      Conjunctiva/sclera: Conjunctivae normal.      Pupils: Pupils are equal, round, and reactive to light.   Cardiovascular:      Rate and Rhythm: Normal rate and regular rhythm.      Heart sounds: Normal heart sounds.   Pulmonary:      Effort: Pulmonary effort is normal.      Breath sounds: Examination of the right-lower field reveals decreased breath sounds. Examination of the left-lower field reveals decreased breath sounds. Decreased breath sounds present.   Abdominal:      General: Bowel sounds are normal.      Palpations: Abdomen is soft.   Genitourinary:     Penis: Normal.       Prostate: Normal.      Rectum: Normal.   Musculoskeletal:         General: Normal range of motion.      Cervical back: Normal range of motion and neck supple.      Right lower leg: Edema present.      Left lower leg: Edema present.   Skin:     General: Skin is warm and dry.   Neurological:      Mental Status: He is alert and oriented to person, place, and time.      Deep Tendon Reflexes: Reflexes are normal and symmetric.         ED Course        Labs Reviewed   CBC WITH DIFFERENTIAL WITH PLATELET - Abnormal; Notable for the following components:       Result Value    RBC 3.11 (*)     HGB 10.1 (*)     HCT 30.4 (*)     RDW-SD 47.7 (*)     All other components within normal limits   COMP METABOLIC PANEL (14) - Abnormal; Notable for the following components:    Glucose 179 (*)     BUN 32 (*)     Creatinine 1.48 (*)     BUN/CREA Ratio 21.6 (*)     Calculated Osmolality 303 (*)     eGFR-Cr 44 (*)     All other components within normal limits   PRO BETA NATRIURETIC PEPTIDE - Abnormal; Notable for the following components:    Pro-Beta Natriuretic Peptide 5,314 (*)     All other components within normal limits   TROPONIN I HIGH SENSITIVITY - Normal   SARS-COV-2/FLU A AND B/RSV BY PCR (GENEXPERT) - Normal    Narrative:     This test is intended for the qualitative detection and  differentiation of SARS-CoV-2, influenza A, influenza B, and respiratory syncytial virus (RSV) viral RNA in nasopharyngeal or nares swabs from individuals suspected of respiratory viral infection consistent with COVID-19 by their healthcare provider. Signs and symptoms of respiratory viral infection due to SARS-CoV-2, influenza, and RSV can be similar.                                    Test performed using the Xpert Xpress SARS-CoV-2/FLU/RSV (real time RT-PCR)  assay on the GeneXpert instrument, [employer], [place] [address].                   This test is being used under the Food and Drug Administration's Emergency Use Authorization.                                    The authorized Fact Sheet for Healthcare Providers for this assay is available upon request from the laboratory.   RAINBOW DRAW BLUE     EKG    Rate, intervals and axes as noted on EKG Report.  Rate: 99  Rhythm: Atrial Fibrillation  Reading: No ST deviation, normal axis, A-fib no T wave             Imaging Results Available and Reviewed while in ED: XR CHEST AP PORTABLE  (CPT=71045)  Result Date: 6/30/2025  CONCLUSION: Moderate left and small right pleural effusions. Increased interstitial markings bilaterally, which may be secondary to interstitial edema and/or atypical/viral pneumonia. Left basilar opacity, which may be secondary to subsegmental atelectasis, with or without superimposed infection. Electronically Verified and Signed by Attending Radiologist: Manisha Fuller MD 6/30/2025 2:46 PM Workstation: [id number]    ED Medications Administered:   Medications   furosemide (Lasix) 10 mg/mL injection 40 mg (40 mg Intravenous Given 6/30/25 1737)         MDM     Vitals:    06/30/25 1515 06/30/25 1527 06/30/25 1542 06/30/25 1557   BP: 158/76 116/62 138/85 108/52   Pulse: 103 89 81 85   Resp: 18 20 22 22   Temp:       TempSrc:       SpO2: 98% 99% 98% 94%   Weight:       Height:         *I personally reviewed and interpreted all ED vitals.  I also  personally reviewed all labs and imaging if ordered    Pulse Ox: 98%, Room air, Normal     Monitor Interpretation:   atrial fibrillation, with ventricular rate of 102    Differential Diagnosis/ Diagnostic Considerations: CHF, pleural effusion, A-fib, ACS.    Medical Record Review: I personally reviewed available prior medical records for any recent pertinent discharge summaries, testing, and procedures and reviewed those reports.    Complicating Factors: The patient already has does not have any pertinent problems on file. to contribute to the complexity of this ED evaluation.    Medical Decision Making  89-year-old male presents ER with complaint of shortness of breath.  Patient with pulmonary congestion on chest x-ray as well as pleural effusion.  Patient given 40 of Lasix IV elevated BNP greater than 5000 with lower extremity edema.  [facility] cardiology notified of the admission.  [facility] hospitalist notified of admission as well.  Patient's family at bedside made aware of the disposition admission.    Total Critical Care Time: 34 minutes including time spent examining and re-evaluating the patient, ordering and reviewing laboratory tests, documenting, reviewing previous records, obtaining information from the family, and speaking with consultants, admitting doctors, nurses and medics and excludes any time spent on procedures.      Problems Addressed:  Acute on chronic congestive heart failure, unspecified heart failure type (HCC): acute illness or injury  Bilateral lower extremity edema: acute illness or injury  Pleural effusion: acute illness or injury    Amount and/or Complexity of Data Reviewed  Independent Historian:      Details: Medical history obtained from by members bedside states has been having worsening shortness of breath for the last 3 weeks.  External Data Reviewed: notes.     Details: [facility] outpatient cardiology notes reviewed.  Patient with history of CHF with improvement in EF to 50 to 85% after  medical treatment.  Patient with 90% stenosis of his LAD.  Labs: ordered. Decision-making details documented in ED Course.  Radiology: ordered and independent interpretation performed. Decision-making details documented in ED Course.     Details: Chest x-ray reviewed by myself shows cardiomegaly as well as pleural effusions and pulmonary congestion.        Condition upon leaving the department: Stable    Disposition and Plan     Clinical Impression:  1. Pleural effusion    2. Acute on chronic congestive heart failure, unspecified heart failure type (HCC)    3. Bilateral lower extremity edema        Disposition:  Admit    Follow-up:  No follow-up provider specified.    Medications Prescribed:  Current Discharge Medication List          Hospital Problems       Present on Admission  Date Reviewed: 3/14/2022          ICD-10-CM Noted POA    * (Principal) Pleural effusion J90 2025 Unknown               [1]   Past Medical History:   Cataract    Dry eye    Former smoker    Hyperlipidemia    Impaired fasting glucose    Left carotid bruit    Orbital mass    left    Pseudophakia    PVD (peripheral vascular disease)    Stroke (cerebrum) ([facility])   [2]   Past Surgical History:  Procedure Laterality Date    Bedside cardioversion  2024    Carotid endarterectomy Left     Cataract Bilateral     Yag capsulotomy - od - right eye Right 2005   [3] (Not in a hospital admission)   [4]   Family History  Problem Relation Age of Onset    Cataracts Mother    [5]   Social History  Socioeconomic History    Marital status:    Tobacco Use    Smoking status: Former     Current packs/day: 0.00     Average packs/day: 1 pack/day for 30.0 years (30.0 ttl pk-yrs)     Types: Cigarettes     Start date: 1948     Quit date: 1978     Years since quittin.5     Passive exposure: Never    Smokeless tobacco: Never   Vaping Use    Vaping status: Never Used   Substance and Sexual Activity    Alcohol use: No      Alcohol/week: 0.0 standard drinks of alcohol    Drug use: No

## 2025-07-01 VITALS
HEIGHT: 73 IN | HEART RATE: 101 BPM | TEMPERATURE: 97 F | SYSTOLIC BLOOD PRESSURE: 103 MMHG | OXYGEN SATURATION: 96 % | WEIGHT: 143.06 LBS | RESPIRATION RATE: 16 BRPM | DIASTOLIC BLOOD PRESSURE: 69 MMHG | BODY MASS INDEX: 18.96 KG/M2

## 2025-07-01 LAB
ANION GAP SERPL CALC-SCNC: 10 MMOL/L (ref 0–18)
BASOPHILS # BLD AUTO: 0.05 X10(3) UL (ref 0–0.2)
BASOPHILS NFR BLD AUTO: 0.6 %
BUN BLD-MCNC: 32 MG/DL (ref 9–23)
BUN/CREAT SERPL: 21.6 (ref 10–20)
CALCIUM BLD-MCNC: 9.7 MG/DL (ref 8.7–10.4)
CHLORIDE SERPL-SCNC: 104 MMOL/L (ref 98–112)
CO2 SERPL-SCNC: 30 MMOL/L (ref 21–32)
CREAT BLD-MCNC: 1.48 MG/DL (ref 0.7–1.3)
DEPRECATED RDW RBC AUTO: 46.9 FL (ref 35.1–46.3)
EGFRCR SERPLBLD CKD-EPI 2021: 44 ML/MIN/1.73M2 (ref 60–?)
EOSINOPHIL # BLD AUTO: 0.32 X10(3) UL (ref 0–0.7)
EOSINOPHIL NFR BLD AUTO: 3.9 %
ERYTHROCYTE [DISTWIDTH] IN BLOOD BY AUTOMATED COUNT: 13.2 % (ref 11–15)
GLUCOSE BLD-MCNC: 125 MG/DL (ref 70–99)
GLUCOSE BLDC GLUCOMTR-MCNC: 116 MG/DL (ref 70–99)
GLUCOSE BLDC GLUCOMTR-MCNC: 225 MG/DL (ref 70–99)
HCT VFR BLD AUTO: 33.8 % (ref 39–53)
HGB BLD-MCNC: 11.4 G/DL (ref 13–17.5)
IMM GRANULOCYTES # BLD AUTO: 0.02 X10(3) UL (ref 0–1)
IMM GRANULOCYTES NFR BLD: 0.2 %
LYMPHOCYTES # BLD AUTO: 1.97 X10(3) UL (ref 1–4)
LYMPHOCYTES NFR BLD AUTO: 23.8 %
MAGNESIUM SERPL-MCNC: 1.8 MG/DL (ref 1.6–2.6)
MCH RBC QN AUTO: 32.4 PG (ref 26–34)
MCHC RBC AUTO-ENTMCNC: 33.7 G/DL (ref 31–37)
MCV RBC AUTO: 96 FL (ref 80–100)
MONOCYTES # BLD AUTO: 0.98 X10(3) UL (ref 0.1–1)
MONOCYTES NFR BLD AUTO: 11.9 %
NEUTROPHILS # BLD AUTO: 4.93 X10 (3) UL (ref 1.5–7.7)
NEUTROPHILS # BLD AUTO: 4.93 X10(3) UL (ref 1.5–7.7)
NEUTROPHILS NFR BLD AUTO: 59.6 %
OSMOLALITY SERPL CALC.SUM OF ELEC: 306 MOSM/KG (ref 275–295)
PLATELET # BLD AUTO: 239 10(3)UL (ref 150–450)
POTASSIUM SERPL-SCNC: 3.5 MMOL/L (ref 3.5–5.1)
RBC # BLD AUTO: 3.52 X10(6)UL (ref 3.8–5.8)
SODIUM SERPL-SCNC: 144 MMOL/L (ref 136–145)
WBC # BLD AUTO: 8.3 X10(3) UL (ref 4–11)

## 2025-07-01 PROCEDURE — 99239 HOSP IP/OBS DSCHRG MGMT >30: CPT | Performed by: HOSPITALIST

## 2025-07-01 RX ORDER — FUROSEMIDE 20 MG/1
20 TABLET ORAL DAILY
Status: DISCONTINUED | OUTPATIENT
Start: 2025-07-01 | End: 2025-07-01

## 2025-07-01 RX ORDER — MAGNESIUM OXIDE 400 MG/1
400 TABLET ORAL ONCE
Status: COMPLETED | OUTPATIENT
Start: 2025-07-01 | End: 2025-07-01

## 2025-07-01 RX ORDER — FUROSEMIDE 20 MG/1
TABLET ORAL
Qty: 8 TABLET | Refills: 0 | Status: SHIPPED | OUTPATIENT
Start: 2025-07-01

## 2025-07-01 NOTE — PLAN OF CARE
Problem: Patient Centered Care  Goal: Patient preferences are identified and integrated in the patient's plan of care  Description: Interventions:  - What would you like us to know as we care for you?   - Provide timely, complete, and accurate information to patient/family  - Incorporate patient and family knowledge, values, beliefs, and cultural backgrounds into the planning and delivery of care  - Encourage patient/family to participate in care and decision-making at the level they choose  - Honor patient and family perspectives and choices  Outcome: Adequate for Discharge     Problem: Patient/Family Goals  Goal: Patient/Family Long Term Goal  Description: Patient's Long Term Goal:     Interventions:  - See additional Care Plan goals for specific interventions  Outcome: Adequate for Discharge  Goal: Patient/Family Short Term Goal  Description: Patient's Short Term Goal:     Interventions:     - See additional Care Plan goals for specific interventions  Outcome: Adequate for Discharge     Problem: PAIN - ADULT  Goal: Verbalizes/displays adequate comfort level or patient's stated pain goal  Description: INTERVENTIONS:  - Encourage pt to monitor pain and request assistance  - Assess pain using appropriate pain scale  - Administer analgesics based on type and severity of pain and evaluate response  - Implement non-pharmacological measures as appropriate and evaluate response  - Consider cultural and social influences on pain and pain management  - Manage/alleviate anxiety  - Utilize distraction and/or relaxation techniques  - Monitor for opioid side effects  - Notify MD/LIP if interventions unsuccessful or patient reports new pain  - Anticipate increased pain with activity and pre-medicate as appropriate  Outcome: Adequate for Discharge     Problem: RISK FOR INFECTION - ADULT  Goal: Absence of fever/infection during anticipated neutropenic period  Description: INTERVENTIONS  - Monitor WBC  - Administer growth  factors as ordered  - Implement neutropenic guidelines  Outcome: Adequate for Discharge     Problem: SAFETY ADULT - FALL  Goal: Free from fall injury  Description: INTERVENTIONS:  - Assess pt frequently for physical needs  - Identify cognitive and physical deficits and behaviors that affect risk of falls.  - [place] fall precautions as indicated by assessment.  - Educate pt/family on patient safety including physical limitations  - Instruct pt to call for assistance with activity based on assessment  - Modify environment to reduce risk of injury  - Provide assistive devices as appropriate  - Consider OT/PT consult to assist with strengthening/mobility  - Encourage toileting schedule  Outcome: Adequate for Discharge     Problem: DISCHARGE PLANNING  Goal: Discharge to home or other facility with appropriate resources  Description: INTERVENTIONS:  - Identify barriers to discharge w/pt and caregiver  - Include patient/family/discharge partner in discharge planning  - Arrange for needed discharge resources and transportation as appropriate  - Identify discharge learning needs (meds, wound care, etc)  - Arrange for interpreters to assist at discharge as needed  - Consider post-discharge preferences of patient/family/discharge partner  - Complete POLST form as appropriate  - Assess patient's ability to be responsible for managing their own health  - Refer to Case Management Department for coordinating discharge planning if the patient needs post-hospital services based on physician/LIP order or complex needs related to functional status, cognitive ability or social support system  Outcome: Adequate for Discharge

## 2025-07-01 NOTE — CM/SW NOTE
07/01/25 1500   Discharge disposition   Expected discharge disposition Home or Self   Discharge transportation Private car     Lisa CLARKN RN SEBASTIEN VIERA  RN Case Manager [facility]

## 2025-07-01 NOTE — PROGRESS NOTES
[facility]  part of [facility]  Hospitalist Progress Note     Edward ADRIANE Heard Patient Status:  Inpatient    1934  89 year old CSN [id number]   Location 506/506-A Attending Tad Tejeda MD   Hosp Day # 1 PCP COURTNEY BROWN     Assessment & Plan:   ----------------------------------  Acute on chronic CHF.  Presumably systolic with last ejection fraction noted in  of 15%.  However other documentation suggesting recovered ejection fraction.  This may be based on echocardiograms that I have not able to review.  - IV Lasix  - Cardiology following  - Telemetry  - BMP in the morning    Paroxysmal atrial fibrillation.  Rate controlled.  - Continue Eliquis, metoprolol    Other problems  Coronary artery disease  Diabetes  CKD stage III  Deconditioning    Supplementary Documentation:   DVT Mechanical Prophylaxis:        DVT Pharmacologic Prophylaxis   Medication    apixaban (Eliquis) tab 5 mg                Code Status: DNAR/Full Treatment  [place]: No urinary catheter in place  [place] Duration (in days):   Central line:    JOSE:         **Certification      PHYSICIAN Certification of Need for Inpatient Hospitalization - Initial Certification    Patient will require inpatient services that will reasonably be expected to span two midnight's based on the clinical documentation in H+P.   Based on patients current state of illness, I anticipate that, after discharge, patient will require TBD.           I personally reviewed the available laboratories, imaging including. I discussed/will discuss the case with consultants. I ordered laboratories and/or radiographic studies. I adjusted medications as detailed above.  Medical decision making high, risk is high.    Subjective:   ----------------------------------  Patient feels great, not short of breath at all.  Interested in going home soon.      Objective:   Chief Complaint:   Chief Complaint   Patient presents with    Difficulty Breathing      ----------------------------------  Temp:  [97 °F (36.1 °C)-98.4 °F (36.9 °C)] 97 °F (36.1 °C)  Pulse:  [] 101  Resp:  [16-25] 16  BP: (103-158)/(52-85) 103/69  SpO2:  [92 %-99 %] 96 %  Gen: A+Ox3.  No distress.   HEENT: NCAT, neck supple, no carotid bruit.  CV: RRR, S1S2, and intact distal pulses. No gallop, rub, murmur.  Pulm: Effort and breath sounds normal. No distress, wheezes, rales, rhonchi.  Abd: Soft, NTND, BS normal, no mass, no HSM, no rebound/guarding.   Neuro: Normal reflexes, CN. Sensory/motor exams grossly normal deficit.   MS: No joint effusions.  No peripheral edema.  Skin: Skin is warm and dry. No rashes, erythema, diaphoresis.   Psych: Normal mood and affect. Calm, cooperative    Labs:  Lab Results   Component Value Date    HGB 11.4 (L) 07/01/2025    WBC 8.3 07/01/2025    .0 07/01/2025     07/01/2025    K 3.5 07/01/2025    CREATSERUM 1.48 (H) 07/01/2025    INR 0.9 (L) 05/23/2008    BILIRUBTOTAL 0.6 04/02/2015    AST 22 06/30/2025    ALT 22 06/30/2025    TROP <0.017 12/01/2018           Scheduled Medications[1]  PRN Medications[2]             [1]    apixaban  5 mg Oral BID    atorvastatin  40 mg Oral Daily    metoprolol succinate ER  50 mg Oral Daily    montelukast  10 mg Oral Daily    pantoprazole  40 mg Oral QAM AC    sacubitril-valsartan  1 tablet Oral BID    furosemide  40 mg Intravenous BID (Diuretic)    insulin aspart  1-7 Units Subcutaneous TID CC    [START ON 7/2/2025] sodium zirconium cyclosilicate  10 g Oral Q MWF   [2]   glucose **OR** glucose **OR** glucose-vitamin C **OR** dextrose **OR** glucose **OR** glucose **OR** glucose-vitamin C    acetaminophen    ondansetron    metoclopramide

## 2025-07-01 NOTE — DISCHARGE PLANNING
Discharge order in per MD. Discharges instructions given to patient and family  . PIV removed. Patient and family  educated on important follow ups and medication regimen. Patient discharged home  via family and private vehicle.

## 2025-07-01 NOTE — DIETARY NOTE
NUTRITION EDUCATION NOTE     Received consult for \"heart failure diet education.\" Verbally reviewed basic low sodium diet restrictions. Provided with Low Sodium Nutrition Therapy handout to reinforce. Receptive to instruction. May benefit from outpt f/u. Expect fair compliance.     Ese Abreu MS, KI, RDN, LDN  Clinical Dietitian  P: [phone]

## 2025-07-01 NOTE — PLAN OF CARE
Problem: Patient Centered Care  Goal: Patient preferences are identified and integrated in the patient's plan of care  Description: Interventions:  - What would you like us to know as we care for you?   - Provide timely, complete, and accurate information to patient/family  - Incorporate patient and family knowledge, values, beliefs, and cultural backgrounds into the planning and delivery of care  - Encourage patient/family to participate in care and decision-making at the level they choose  - Honor patient and family perspectives and choices  Outcome: Progressing     Problem: Patient/Family Goals  Goal: Patient/Family Long Term Goal  Description: Patient's Long Term Goal:    Interventions:  -   - See additional Care Plan goals for specific interventions  Outcome: Progressing  Goal: Patient/Family Short Term Goal  Description: Patient's Short Term Goal:     Interventions:   -   - See additional Care Plan goals for specific interventions  Outcome: Progressing     Problem: PAIN - ADULT  Goal: Verbalizes/displays adequate comfort level or patient's stated pain goal  Description: INTERVENTIONS:  - Encourage pt to monitor pain and request assistance  - Assess pain using appropriate pain scale  - Administer analgesics based on type and severity of pain and evaluate response  - Implement non-pharmacological measures as appropriate and evaluate response  - Consider cultural and social influences on pain and pain management  - Manage/alleviate anxiety  - Utilize distraction and/or relaxation techniques  - Monitor for opioid side effects  - Notify MD/LIP if interventions unsuccessful or patient reports new pain  - Anticipate increased pain with activity and pre-medicate as appropriate  Outcome: Progressing     Problem: RISK FOR INFECTION - ADULT  Goal: Absence of fever/infection during anticipated neutropenic period  Description: INTERVENTIONS  - Monitor WBC  - Administer growth factors as ordered  - Implement neutropenic  guidelines  Outcome: Progressing     Problem: SAFETY ADULT - FALL  Goal: Free from fall injury  Description: INTERVENTIONS:  - Assess pt frequently for physical needs  - Identify cognitive and physical deficits and behaviors that affect risk of falls.  - Mallory fall precautions as indicated by assessment.  - Educate pt/family on patient safety including physical limitations  - Instruct pt to call for assistance with activity based on assessment  - Modify environment to reduce risk of injury  - Provide assistive devices as appropriate  - Consider OT/PT consult to assist with strengthening/mobility  - Encourage toileting schedule  Outcome: Progressing     Problem: DISCHARGE PLANNING  Goal: Discharge to home or other facility with appropriate resources  Description: INTERVENTIONS:  - Identify barriers to discharge w/pt and caregiver  - Include patient/family/discharge partner in discharge planning  - Arrange for needed discharge resources and transportation as appropriate  - Identify discharge learning needs (meds, wound care, etc)  - Arrange for interpreters to assist at discharge as needed  - Consider post-discharge preferences of patient/family/discharge partner  - Complete POLST form as appropriate  - Assess patient's ability to be responsible for managing their own health  - Refer to Case Management Department for coordinating discharge planning if the patient needs post-hospital services based on physician/LIP order or complex needs related to functional status, cognitive ability or social support system  Outcome: Progressing

## 2025-07-01 NOTE — PROGRESS NOTES
[facility]  [facility]  Cardiology Progress Note    Edward Heard Patient Status:  Inpatient    1934 MRN [id number]   Location [facility]5W Attending Tad Tejeda MD   Hosp Day # 1 PCP COURTNEY BROWN       Impression:  Acute decompensated HFrecEF, resolved. Feels back to baseline. Exam with resolution of bibasilar crackles.   - 3/2025: LVEF 50-55%   - UOP 4.1 L   CAD, tx medically  pAF on DOAC  HDL  Carotid artery disease on statin  T2DM  Carotid artery disease s/p left CEA     - Okay for PO lasix 20mg today. He feels back to baseline with minimal LE edema.  - Continue home GDMT  - Continue DOAC  - Continue statin  - Strict I/O, daily BMP, daily weights  - On discharge, would discharge on lasix 20mg PO Monday, Wednesday.    Okay for dc from CV standpoint.    Subjective:   Feels back to baseline. Walked around and felt well.     Problem List[1]    Objective:   Temp: 97 °F (36.1 °C)  Pulse: 101  Resp: 16  BP: 103/69    Intake/Output:     Intake/Output Summary (Last 24 hours) at 2025 1418  Last data filed at 2025 1045  Gross per 24 hour   Intake 410 ml   Output 4525 ml   Net -4115 ml       Last 3 Weights   25 0606 143 lb 1.3 oz (64.9 kg)   25 1921 168 lb 6.9 oz (76.4 kg)   25 1257 151 lb (68.5 kg)   24 0600 143 lb (64.9 kg)   24 0526 141 lb (64 kg)   24 1459 141 lb 15.6 oz (64.4 kg)   24 1010 142 lb (64.4 kg)   02/10/24 0500 154 lb 1.6 oz (69.9 kg)   24 0554 155 lb (70.3 kg)   24 0434 155 lb 8 oz (70.5 kg)   24 0522 157 lb 6.4 oz (71.4 kg)   24 0400 150 lb 1.6 oz (68.1 kg)   24 0500 152 lb 8 oz (69.2 kg)   24 0517 154 lb (69.9 kg)   24 0519 156 lb (70.8 kg)   24 0450 156 lb 9.6 oz (71 kg)   24 1341 161 lb 12.8 oz (73.4 kg)   24 1200 161 lb 12.8 oz (73.4 kg)   24 1059 160 lb (72.6 kg)       Tele: NSR    Physical Exam:    General: Alert and oriented x 3. No apparent  distress. No respiratory or constitutional distress.  HEENT: Normocephalic, anicteric sclera, neck supple, no thyromegaly or adenopathy.  Neck: No JVD, carotids 2+, no bruits.  Cardiac: Regular rate and rhythm. S1, S2 normal. No murmur, pericardial rub, S3, thrill, heave or extra cardiac sounds.  Lungs: Clear without wheezes, rales, rhonchi or dullness.  Normal excursions and effort.  Abdomen: Soft, non-tender. No organosplenomegally, mass or rebound, BS-present.  Extremities: Trace bilat edema.  Neurologic: Alert and oriented, normal affect. No motor or coordinational deficit.  Skin: Warm and dry.     Laboratory/Data:    Labs:         Recent Labs   Lab 06/30/25  1430 07/01/25  0536   WBC 6.3 8.3   HGB 10.1* 11.4*   MCV 97.7 96.0   .0 239.0       Recent Labs   Lab 06/30/25  1430 07/01/25  0537    144   K 5.1 3.5    104   CO2 27.0 30.0   BUN 32* 32*   CREATSERUM 1.48* 1.48*   CA 9.2 9.7   MG  --  1.8   * 125*       Recent Labs   Lab 06/30/25  1430   ALT 22   AST 22   ALB 4.2       No results for input(s): \"TROP\" in the last 168 hours.    Allergies:   Allergies[2]    Medications:  Current Hospital Medications[3]    Mau Grigsby MD  7/1/2025  2:18 PM         [1]   Patient Active Problem List  Diagnosis    CHRONIC AIRWAY OBSTRUCTION    Carotid disease, bilateral    Uncontrolled type 2 diabetes mellitus with hyperglycemia, without long-term current use of insulin (HCC)    Bilateral pseudophakia    Arcus senilis of cornea, bilateral    Atrial fibrillation, new onset (HCC)    Congestive heart failure, unspecified HF chronicity, unspecified heart failure type (HCC)    Hyperkalemia    Pleural effusion    Acute on chronic congestive heart failure, unspecified heart failure type (HCC)    Bilateral lower extremity edema    Acute on chronic heart failure with reduced ejection fraction (HFrEF, <= 40%) ([facility])   [2] No Known Allergies  [3]   Current Facility-Administered Medications   Medication Dose Route  Frequency    apixaban (Eliquis) tab 5 mg  5 mg Oral BID    atorvastatin (Lipitor) tab 40 mg  40 mg Oral Daily    metoprolol succinate ER (Toprol XL) 24 hr tab 50 mg  50 mg Oral Daily    montelukast (Singulair) tab 10 mg  10 mg Oral Daily    pantoprazole (Protonix) DR tab 40 mg  40 mg Oral QAM AC    sacubitril-valsartan (Entresto) 24-26 MG per tab 1 tablet  1 tablet Oral BID    furosemide (Lasix) 10 mg/mL injection 40 mg  40 mg Intravenous BID (Diuretic)    glucose (Dex4) 15 GM/59ML oral liquid 15 g  15 g Oral Q15 Min PRN    Or    glucose (Glutose) 40% oral gel 15 g  15 g Oral Q15 Min PRN    Or    glucose-vitamin C (Dex-4) chewable tab 4 tablet  4 tablet Oral Q15 Min PRN    Or    dextrose 50% injection 50 mL  50 mL Intravenous Q15 Min PRN    Or    glucose (Dex4) 15 GM/59ML oral liquid 30 g  30 g Oral Q15 Min PRN    Or    glucose (Glutose) 40% oral gel 30 g  30 g Oral Q15 Min PRN    Or    glucose-vitamin C (Dex-4) chewable tab 8 tablet  8 tablet Oral Q15 Min PRN    insulin aspart (NovoLOG) 100 Units/mL FlexPen 1-7 Units  1-7 Units Subcutaneous TID CC    acetaminophen (Tylenol Extra Strength) tab 500 mg  500 mg Oral Q4H PRN    ondansetron (Zofran) 4 MG/2ML injection 4 mg  4 mg Intravenous Q6H PRN    metoclopramide (Reglan) 5 mg/mL injection 5 mg  5 mg Intravenous Q8H PRN    [START ON 7/2/2025] sodium zirconium cyclosilicate (Lokelma) oral packet 10 g  10 g Oral Q MWF

## 2025-07-01 NOTE — CM/SW NOTE
07/01/25 1300   CM/SW Referral Data   Referral Source    Reason for Referral Discharge planning   Informant Daughter   Medical Hx   Does patient have an established PCP? Yes   Significant Past Medical/Mental Health Hx CAD   Patient Info   Advanced directives? Yes  (DNAR/full tx/ wife and daughter HCPOA)   Patient's Home Environment Other   Number of Levels in Home   ([facility])   Patient lives with   ([facility])   Patient Status Prior to Admission   Independent with ADLs and Mobility Yes   Discharge Needs   Anticipated D/C needs   (TBD)     Spoke with daughter.  She confirmed with there father that he wants to remain DNAR.  Wife and daughter are acting as HCPOA.  Patient was driving and independent prior to this admission.  Does have a walker at home that he does not use.    PLAN:  TBD after PT and OT evals.    Lisa Hernández MBA BSN RN SEBASTIEN VIERA  RN Case Manager PMU

## 2025-07-01 NOTE — DISCHARGE SUMMARY
[facility]  part of [facility]     DISCHARGE SUMMARY     Edward Heard Patient Status:  Inpatient    1934 MRN [id number]   Location [facility]W Attending Tad Tejeda MD   Hosp Day # 1 PCP COURTNEY BROWN     DATE OF ADMISSION: 2025  DATE OF DISCHARGE:  25  DISPOSITION: home  CONDITION ON DISCHARGE: good    DISCHARGE DIAGNOSES:  Acute on chronic CHF  Paroxysmal atrial fibrillation  Coronary artery disease  Diabetes  CKD stage III  Deconditioning     HISTORY OF PRESENT ILLNESS (COPIED FROM ADMISSION H&P)  The patient is a 89-year-old  male with known underlying ischemic cardiomyopathy, recently was given a course of antibiotics for abnormal chest x-ray though he did not have any symptoms of pneumonia. Chest x-ray continued to show fluid overload status. Today, he was sent to the emergency department at [place] for evaluation. CBC showed hemoglobin 10.1. Chemistry showed GFR of 44 which is at baseline. BUN 32 and creatinine 1.48, glucose 179. ProBNP 5300. GeneXpert viral panel was negative. Chest x-ray showed increased interstitial markings consistent with fluid overload status. EKG showed atrial fibrillation, rate controlled.     HOSPITAL COURSE:  Patient was admitted, given IV Lasix with torres resolution of his fluid overload status and symptoms.  He was feeling back to baseline.  Reevaluated by cardiology on day 2 and felt to be stable enough for discharge.  He will be started on Lasix 20 mg twice a week, reevaluated in the cardiology clinic.    Patient understands return to the emergency room for increased pain, fever, discharge, shortness of breath, chest pain, new neurologic symptoms, other concerning symptoms.    PHYSICAL EXAM:  Temp:  [97 °F (36.1 °C)-98.4 °F (36.9 °C)] 97 °F (36.1 °C)  Pulse:  [] 101  Resp:  [16-25] 16  BP: (103-158)/(52-85) 103/69  SpO2:  [92 %-99 %] 96 %  Gen: A+Ox3.  No distress.   HEENT: NCAT, neck supple, no carotid bruit.  CV:  RRR, S1S2, and intact distal pulses. No gallop, rub, murmur.  Pulm: Effort and breath sounds normal. No distress, wheezes, rales, rhonchi.  Abd: Soft, NTND, BS normal, no mass, no HSM, no rebound/guarding.   Neuro: Normal reflexes, CN. Sensory/motor exams grossly normal deficit. Coordination  and gait normal.   MS: No joint effusions.  No peripheral edema.  Skin: Skin is warm and dry. No rashes, erythema, diaphoresis.   Psych: Normal mood and affect. Behavior and judgment normal.     DISCHARGE MEDICATIONS     Discharge Medications        START taking these medications        Instructions Prescription details   furosemide 20 MG Tabs  Commonly known as: Lasix      Take 1 tablet on Mondays and Wednesdays   Quantity: 8 tablet  Refills: 0            CONTINUE taking these medications        Instructions Prescription details   apixaban 5 MG Tabs  Commonly known as: Eliquis      Take 1 tablet (5 mg total) by mouth 2 (two) times daily.   Quantity: 60 tablet  Refills: 0     atorvastatin 40 MG Tabs  Commonly known as: Lipitor      Take 1 tablet (40 mg total) by mouth daily.   Quantity: 90 tablet  Refills: 0     Entresto 24-26 MG Tabs  Generic drug: sacubitril-valsartan      Take 1 tablet by mouth 2 (two) times daily.   Refills: 0     Lokelma 10 g Pack  Generic drug: sodium zirconium cyclosilicate      Take 1 packet (10 g total) by mouth Every Monday, Wednesday, and Friday.   Refills: 0     metFORMIN 500 MG Tabs  Commonly known as: Glucophage      Take 1 tablet (500 mg total) by mouth daily with breakfast.   Refills: 0     metoprolol succinate ER 50 MG Tb24  Commonly known as: Toprol XL      Take 1 tablet (50 mg total) by mouth daily.   Quantity: 30 tablet  Refills: 0     montelukast 10 MG Tabs  Commonly known as: Singulair      Take 1 tablet (10 mg total) by mouth in the morning.   Refills: 0     ONE-A-DAY 50 PLUS OR      Take 1 tablet by mouth daily.   Refills: 0     pantoprazole 40 MG Tbec  Commonly known as: Protonix       Take 1 tablet (40 mg total) by mouth every morning before breakfast.   Quantity: 30 tablet  Refills: 0               Where to Get Your Medications        These medications were sent to [facility]/pharmacy #[id number] - [place], [place] - [address] [phone], [phone]  [address] [place] [address]      Phone: [phone]   furosemide 20 MG Tabs         CONSULTANTS  Consultants         Provider   Role Specialty     Mau Grigsby MD      Consulting Physician Interventional, Cardiology            FOLLOW UP:  No follow-up provider specified.  The above plan and follow-up instructions were reviewed with the patient and they verbalized understanding and agreement.  They understand to return to the emergency room for any concerning signs or symptoms.  Greater than 30 minutes spent on discharge.  -----------------------    Hospital Discharge Diagnoses: Acute on chronic CHF    Lace+ Score: 66  59-90 High Risk  29-58 Medium Risk  0-28   Low Risk.    TCM Follow-Up Recommendation:  LACE > 58: High Risk of readmission after discharge from the hospital.  Supplementary Documentation:

## (undated) NOTE — LETTER
[place] [address]  Authorization for Invasive Procedures  Date: 02/09/2024           Time: 0800    I hereby authorize , my physician and his/her assistants (if applicable), which may include medical students, residents, and/or fellows, to perform the following surgical operation/ procedure and administer such anesthesia as may be determined necessary by my physician: Transesophageal echocardiogram/ Cardioversion on Edward Heard  2.   I recognize that during the surgical operation/procedure, unforeseen conditions may necessitate additional or different procedures than those listed above.  I, therefore, further authorize and request that the above-named surgeon, assistants, or designees perform such procedures as are, in their judgment, necessary and desirable.    3.   My surgeon/physician has discussed prior to my surgery the potential benefits, risks and side effects of this procedure; the likelihood of achieving goals; and potential problems that might occur during recuperation.  They also discussed reasonable alternatives to the procedure, including risks, benefits, and side effects related to the alternatives and risks related to not receiving this procedure.  I have had all my questions answered and I acknowledge that no guarantee has been made as to the result that may be obtained.    4.   Should the need arise during my operation/procedure, which includes change of level of care prior to discharge, I also consent to the administration of blood and/or blood products.  Further, I understand that despite careful testing and screening of blood or blood products by collecting agencies, I may still be subject to ill effects as a result of receiving a blood transfusion and/or blood products.  The following are some, but not all, of the potential risks that can occur: fever and allergic reactions, hemolytic reactions, transmission of diseases such as Hepatitis, AIDS and  Cytomegalovirus (CMV) and fluid overload.  In the event that I wish to have an autologous transfusion of my own blood, or a directed donor transfusion, I will discuss this with my physician.   Check only if Refusing Blood or Blood Products  I understand refusal of blood or blood products as deemed necessary by my physician may have serious consequences to my condition to include possible death. I hereby assume responsibility for my refusal and release the hospital, its personnel, and my physicians from any responsibility for the consequences of my refusal.         o  Refuse         5.   I authorize the use of any specimen, organs, tissues, body parts or foreign objects that may be removed from my body during the operation/procedure for diagnosis, research or teaching purposes and their subsequent disposal by hospital authorities.  I also authorize the release of specimen test results and/or written reports to my treating physician on the hospital medical staff or other referring or consulting physicians involved in my care, at the discretion of the Pathologist or my treating physician.    6.   I consent to the photographing or videotaping of the operations or procedures to be performed, including appropriate portions of my body for medical, scientific, or educational purposes, provided my identity is not revealed by the pictures or by descriptive texts accompanying them.  If the procedure has been photographed/videotaped, the surgeon will obtain the original picture, image, videotape or CD.  The hospital will not be responsible for storage, release or maintenance of the picture, image, tape or CD.    7.   I consent to the presence of a  or observers in the operating room as deemed necessary by my physician or their designees.    8.   I recognize that in the event my procedure results in extended X-Ray/fluoroscopy time, I may develop a skin reaction.    9. If I have a Do Not Attempt Resuscitation  (DNAR) order in place, that status will be suspended while in the operating room, procedural suite, and during the recovery period unless otherwise explicitly stated by me (or a person authorized to consent on my behalf). The surgeon or my attending physician will determine when the applicable recovery period ends for purposes of reinstating the DNAR order.  10. Patients having a sterilization procedure: I understand that if the procedure is successful the results will be permanent and it will therefore be impossible for me to inseminate, conceive, or bear children.  I also understand that the procedure is intended to result in sterility, although the result has not been guaranteed.   11. I acknowledge that my physician has explained sedation/analgesia administration to me including the risk and benefits I consent to the administration of sedation/analgesia as may be necessary or desirable in the judgment of my physician.    I CERTIFY THAT I HAVE READ AND FULLY UNDERSTAND THE ABOVE CONSENT TO OPERATION and/or OTHER PROCEDURE.        ____________________________________       _________________________________      ______________________________  Signature of Patient         Signature of Responsible Person        Printed Name of Responsible Person        ____________________________________      _________________________________      ______________________________       Signature of Witness          Relationship to Patient                       Date                                       Time    Patient Name: Edward Heard     : 1934                 Printed: 2024      Medical Record #: [id number]                      Page 1 of 2          STATEMENT OF PHYSICIAN My signature below affirms that prior to the time of the procedure; I have explained to the patient and/or his/her legal representative, the risks and benefits involved in the proposed treatment and any reasonable alternative to the proposed  treatment. I have also explained the risks and benefits involved in refusal of the proposed treatment and alternatives to the proposed treatment and have answered the patient's questions. If I have a significant financial interest in a co-management agreement or a significant financial interest in any product or implant, or other significant relationship used in this procedure/surgery, I have disclosed this and had a discussion with my patient.     _______________________________________________________________ _____________________________  (Signature of Physician)                                                                                         (Date)                                   (Time)    Patient Name: Edward Heard     : 1934                 Printed: 2024      Medical Record #: [id number]                      Page 2 of 2